# Patient Record
Sex: FEMALE | Race: WHITE | Employment: OTHER | URBAN - NONMETROPOLITAN AREA
[De-identification: names, ages, dates, MRNs, and addresses within clinical notes are randomized per-mention and may not be internally consistent; named-entity substitution may affect disease eponyms.]

---

## 2017-01-06 ENCOUNTER — HOSPITAL ENCOUNTER (EMERGENCY)
Age: 42
Discharge: HOME | End: 2017-01-06
Payer: MEDICAID

## 2017-01-06 ENCOUNTER — APPOINTMENT (OUTPATIENT)
Dept: CT IMAGING | Age: 42
End: 2017-01-06
Payer: MEDICAID

## 2017-01-06 VITALS
SYSTOLIC BLOOD PRESSURE: 120 MMHG | RESPIRATION RATE: 18 BRPM | HEART RATE: 88 BPM | DIASTOLIC BLOOD PRESSURE: 81 MMHG | TEMPERATURE: 98.4 F | HEIGHT: 69 IN | WEIGHT: 221 LBS | OXYGEN SATURATION: 98 % | BODY MASS INDEX: 32.73 KG/M2
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
## 2017-01-06 DIAGNOSIS — S00.33XA CONTUSION OF NOSE, INITIAL ENCOUNTER: ICD-10-CM

## 2017-01-06 PROCEDURE — 70486 CT MAXILLOFACIAL W/O DYE: CPT

## 2017-01-06 PROCEDURE — 99282 EMERGENCY DEPT VISIT SF MDM: CPT | Performed by: NURSE PRACTITIONER

## 2017-01-06 PROCEDURE — 99284 EMERGENCY DEPT VISIT MOD MDM: CPT

## 2017-01-06 RX ORDER — OXYCODONE AND ACETAMINOPHEN 10; 325 MG/1; MG/1
1 TABLET ORAL 3 TIMES DAILY PRN
COMMUNITY
End: 2022-06-13 | Stop reason: ALTCHOICE

## 2017-01-06 RX ORDER — LORATADINE 10 MG/1
10 TABLET ORAL DAILY
COMMUNITY

## 2017-01-06 RX ORDER — BUDESONIDE AND FORMOTEROL FUMARATE DIHYDRATE 80; 4.5 UG/1; UG/1
2 AEROSOL RESPIRATORY (INHALATION) 2 TIMES DAILY
COMMUNITY

## 2017-01-06 RX ORDER — PROPRANOLOL HYDROCHLORIDE 20 MG/1
20 TABLET ORAL 3 TIMES DAILY
COMMUNITY

## 2017-01-06 RX ORDER — ESOMEPRAZOLE MAGNESIUM 40 MG/1
40 FOR SUSPENSION ORAL DAILY
COMMUNITY
End: 2018-08-30

## 2017-01-06 RX ORDER — NATEGLINIDE 60 MG/1
60 TABLET ORAL
COMMUNITY
End: 2022-06-13 | Stop reason: ALTCHOICE

## 2017-01-06 RX ORDER — AMITRIPTYLINE HYDROCHLORIDE 50 MG/1
50 TABLET, FILM COATED ORAL
COMMUNITY

## 2017-01-06 RX ORDER — PAROXETINE 30 MG/1
40 TABLET, FILM COATED ORAL EVERY MORNING
COMMUNITY

## 2017-01-06 RX ORDER — METOPROLOL TARTRATE 100 MG/1
100 TABLET ORAL 2 TIMES DAILY
COMMUNITY

## 2017-01-06 RX ORDER — IBUPROFEN 800 MG/1
800 TABLET ORAL EVERY 8 HOURS PRN
COMMUNITY

## 2017-01-06 RX ORDER — MEDROXYPROGESTERONE ACETATE 150 MG/ML
150 INJECTION, SUSPENSION INTRAMUSCULAR
COMMUNITY

## 2017-01-06 RX ORDER — FLUTICASONE PROPIONATE 50 MCG
2 SPRAY, SUSPENSION (ML) NASAL DAILY
COMMUNITY

## 2017-01-06 RX ORDER — TIZANIDINE 4 MG/1
4 TABLET ORAL 3 TIMES DAILY
COMMUNITY

## 2017-01-06 RX ORDER — ALBUTEROL SULFATE 90 UG/1
2 AEROSOL, METERED RESPIRATORY (INHALATION) EVERY 4 HOURS PRN
COMMUNITY

## 2017-01-06 RX ORDER — FUROSEMIDE 40 MG/1
40 TABLET ORAL DAILY
COMMUNITY

## 2017-01-06 RX ORDER — PREGABALIN 100 MG/1
100 CAPSULE ORAL 3 TIMES DAILY
COMMUNITY

## 2017-01-06 ASSESSMENT — PAIN DESCRIPTION - LOCATION: LOCATION: FACE

## 2017-01-06 ASSESSMENT — ENCOUNTER SYMPTOMS: VOMITING: 0

## 2017-01-06 ASSESSMENT — PAIN SCALES - GENERAL: PAINLEVEL_OUTOF10: 7

## 2017-01-06 ASSESSMENT — PAIN DESCRIPTION - PAIN TYPE: TYPE: ACUTE PAIN

## 2017-01-23 ENCOUNTER — HOSPITAL ENCOUNTER (OUTPATIENT)
Dept: GENERAL RADIOLOGY | Age: 42
Discharge: HOME | End: 2017-01-23
Payer: MEDICAID

## 2017-01-23 DIAGNOSIS — M79.602 LEFT ARM PAIN: ICD-10-CM

## 2017-01-23 DIAGNOSIS — M25.512 LEFT SHOULDER PAIN, UNSPECIFIED CHRONICITY: ICD-10-CM

## 2017-01-23 PROCEDURE — 73090 X-RAY EXAM OF FOREARM: CPT

## 2017-01-23 PROCEDURE — 73060 X-RAY EXAM OF HUMERUS: CPT

## 2017-01-23 PROCEDURE — 73030 X-RAY EXAM OF SHOULDER: CPT

## 2017-01-23 PROCEDURE — 73110 X-RAY EXAM OF WRIST: CPT

## 2017-01-30 ENCOUNTER — HOSPITAL ENCOUNTER (OUTPATIENT)
Dept: PHYSICAL THERAPY | Age: 42
Setting detail: THERAPIES SERIES
Discharge: HOME | End: 2017-01-30
Payer: MEDICAID

## 2017-01-30 PROCEDURE — 97162 PT EVAL MOD COMPLEX 30 MIN: CPT

## 2017-01-30 ASSESSMENT — PAIN SCALES - GENERAL: PAINLEVEL_OUTOF10: 4

## 2017-01-30 ASSESSMENT — PAIN DESCRIPTION - PAIN TYPE: TYPE: ACUTE PAIN

## 2017-01-30 ASSESSMENT — PAIN DESCRIPTION - LOCATION: LOCATION: RIB CAGE;HIP

## 2017-01-30 ASSESSMENT — PAIN DESCRIPTION - DESCRIPTORS: DESCRIPTORS: ACHING

## 2017-01-30 ASSESSMENT — PAIN DESCRIPTION - FREQUENCY: FREQUENCY: CONTINUOUS

## 2017-03-27 ENCOUNTER — HOSPITAL ENCOUNTER (OUTPATIENT)
Dept: PHYSICAL THERAPY | Age: 42
Setting detail: THERAPIES SERIES
Discharge: HOME | End: 2017-03-27
Payer: MEDICAID

## 2017-03-27 PROCEDURE — 97162 PT EVAL MOD COMPLEX 30 MIN: CPT

## 2017-04-10 ENCOUNTER — HOSPITAL ENCOUNTER (OUTPATIENT)
Dept: PHYSICAL THERAPY | Age: 42
Setting detail: THERAPIES SERIES
Discharge: HOME | End: 2017-04-10
Payer: MEDICAID

## 2017-04-10 PROCEDURE — 97110 THERAPEUTIC EXERCISES: CPT

## 2017-04-12 ENCOUNTER — HOSPITAL ENCOUNTER (OUTPATIENT)
Dept: PHYSICAL THERAPY | Age: 42
Setting detail: THERAPIES SERIES
Discharge: HOME | End: 2017-04-12
Payer: MEDICAID

## 2017-04-12 PROCEDURE — 97110 THERAPEUTIC EXERCISES: CPT

## 2017-04-17 ENCOUNTER — HOSPITAL ENCOUNTER (OUTPATIENT)
Dept: PHYSICAL THERAPY | Age: 42
Setting detail: THERAPIES SERIES
Discharge: HOME | End: 2017-04-17
Payer: MEDICAID

## 2017-04-17 PROCEDURE — 97112 NEUROMUSCULAR REEDUCATION: CPT

## 2017-04-17 PROCEDURE — 97110 THERAPEUTIC EXERCISES: CPT

## 2017-04-19 ENCOUNTER — HOSPITAL ENCOUNTER (OUTPATIENT)
Dept: PHYSICAL THERAPY | Age: 42
Setting detail: THERAPIES SERIES
Discharge: HOME | End: 2017-04-19
Payer: MEDICAID

## 2017-04-19 PROCEDURE — 97110 THERAPEUTIC EXERCISES: CPT

## 2017-04-24 ENCOUNTER — HOSPITAL ENCOUNTER (OUTPATIENT)
Dept: PHYSICAL THERAPY | Age: 42
Setting detail: THERAPIES SERIES
Discharge: HOME | End: 2017-04-24
Payer: MEDICAID

## 2017-04-24 PROCEDURE — 97110 THERAPEUTIC EXERCISES: CPT

## 2017-04-24 PROCEDURE — 97112 NEUROMUSCULAR REEDUCATION: CPT

## 2017-04-26 ENCOUNTER — APPOINTMENT (OUTPATIENT)
Dept: PHYSICAL THERAPY | Age: 42
End: 2017-04-26
Payer: MEDICAID

## 2017-05-01 ENCOUNTER — HOSPITAL ENCOUNTER (OUTPATIENT)
Dept: PHYSICAL THERAPY | Age: 42
Setting detail: THERAPIES SERIES
Discharge: HOME | End: 2017-05-01
Payer: MEDICAID

## 2017-05-01 PROCEDURE — 97530 THERAPEUTIC ACTIVITIES: CPT

## 2017-05-01 PROCEDURE — 97110 THERAPEUTIC EXERCISES: CPT

## 2017-05-03 ENCOUNTER — HOSPITAL ENCOUNTER (OUTPATIENT)
Dept: PHYSICAL THERAPY | Age: 42
Setting detail: THERAPIES SERIES
Discharge: HOME | End: 2017-05-03
Payer: MEDICAID

## 2017-05-03 PROCEDURE — 97110 THERAPEUTIC EXERCISES: CPT

## 2017-07-28 ENCOUNTER — APPOINTMENT (OUTPATIENT)
Dept: GENERAL RADIOLOGY | Age: 42
End: 2017-07-28
Payer: MEDICAID

## 2017-07-28 ENCOUNTER — HOSPITAL ENCOUNTER (EMERGENCY)
Age: 42
Discharge: HOME | End: 2017-07-28
Payer: MEDICAID

## 2017-07-28 VITALS
DIASTOLIC BLOOD PRESSURE: 71 MMHG | HEART RATE: 99 BPM | TEMPERATURE: 98.2 F | BODY MASS INDEX: 31.1 KG/M2 | OXYGEN SATURATION: 95 % | WEIGHT: 210 LBS | RESPIRATION RATE: 20 BRPM | HEIGHT: 69 IN | SYSTOLIC BLOOD PRESSURE: 109 MMHG

## 2017-07-28 DIAGNOSIS — L03.031 CELLULITIS OF TOE OF RIGHT FOOT: Primary | ICD-10-CM

## 2017-07-28 PROCEDURE — 73630 X-RAY EXAM OF FOOT: CPT

## 2017-07-28 PROCEDURE — 99283 EMERGENCY DEPT VISIT LOW MDM: CPT | Performed by: NURSE PRACTITIONER

## 2017-07-28 PROCEDURE — 99283 EMERGENCY DEPT VISIT LOW MDM: CPT

## 2017-07-28 RX ORDER — CLINDAMYCIN HYDROCHLORIDE 300 MG/1
300 CAPSULE ORAL 3 TIMES DAILY
Qty: 30 CAPSULE | Refills: 0 | Status: SHIPPED | OUTPATIENT
Start: 2017-07-28 | End: 2017-08-07

## 2017-07-28 ASSESSMENT — ENCOUNTER SYMPTOMS
NAUSEA: 0
DIARRHEA: 0
VOMITING: 0

## 2017-07-28 ASSESSMENT — PAIN DESCRIPTION - LOCATION: LOCATION: TOE (COMMENT WHICH ONE)

## 2017-07-28 ASSESSMENT — PAIN DESCRIPTION - ORIENTATION: ORIENTATION: RIGHT

## 2017-07-28 ASSESSMENT — PAIN SCALES - GENERAL: PAINLEVEL_OUTOF10: 8

## 2017-07-28 ASSESSMENT — PAIN DESCRIPTION - PAIN TYPE: TYPE: ACUTE PAIN

## 2017-07-31 ENCOUNTER — HOSPITAL ENCOUNTER (INPATIENT)
Facility: HOSPITAL | Age: 42
LOS: 2 days | Discharge: HOME | End: 2017-08-02
Attending: FAMILY MEDICINE | Admitting: FAMILY MEDICINE

## 2017-07-31 ENCOUNTER — APPOINTMENT (OUTPATIENT)
Dept: GENERAL RADIOLOGY | Facility: HOSPITAL | Age: 42
End: 2017-07-31

## 2017-07-31 PROBLEM — L03.039 CELLULITIS OF GREAT TOE: Status: ACTIVE | Noted: 2017-07-31

## 2017-07-31 LAB
ALBUMIN SERPL-MCNC: 4.1 G/DL (ref 3.5–5)
ALBUMIN/GLOB SERPL: 1.2 G/DL (ref 1.1–2.5)
ALP SERPL-CCNC: 102 U/L (ref 24–120)
ALT SERPL W P-5'-P-CCNC: 61 U/L (ref 0–54)
ANION GAP SERPL CALCULATED.3IONS-SCNC: 13 MMOL/L (ref 4–13)
AST SERPL-CCNC: 39 U/L (ref 7–45)
BASOPHILS # BLD AUTO: 0.05 10*3/MM3 (ref 0–0.2)
BASOPHILS NFR BLD AUTO: 0.6 % (ref 0–2)
BILIRUB SERPL-MCNC: 0.6 MG/DL (ref 0.1–1)
BILIRUB UR QL STRIP: NEGATIVE
BUN BLD-MCNC: 13 MG/DL (ref 5–21)
BUN/CREAT SERPL: 21 (ref 7–25)
CALCIUM SPEC-SCNC: 8.8 MG/DL (ref 8.4–10.4)
CHLORIDE SERPL-SCNC: 105 MMOL/L (ref 98–110)
CLARITY UR: CLEAR
CO2 SERPL-SCNC: 22 MMOL/L (ref 24–31)
COLOR UR: YELLOW
CREAT BLD-MCNC: 0.62 MG/DL (ref 0.5–1.4)
DEPRECATED RDW RBC AUTO: 39.6 FL (ref 40–54)
EOSINOPHIL # BLD AUTO: 0.25 10*3/MM3 (ref 0–0.7)
EOSINOPHIL NFR BLD AUTO: 3.2 % (ref 0–4)
ERYTHROCYTE [DISTWIDTH] IN BLOOD BY AUTOMATED COUNT: 13 % (ref 12–15)
GFR SERPL CREATININE-BSD FRML MDRD: 107 ML/MIN/1.73
GLOBULIN UR ELPH-MCNC: 3.3 GM/DL
GLUCOSE BLD-MCNC: 176 MG/DL (ref 70–100)
GLUCOSE BLDC GLUCOMTR-MCNC: 238 MG/DL (ref 70–130)
GLUCOSE UR STRIP-MCNC: ABNORMAL MG/DL
HCT VFR BLD AUTO: 36.2 % (ref 37–47)
HGB BLD-MCNC: 12.1 G/DL (ref 12–16)
HGB UR QL STRIP.AUTO: NEGATIVE
IMM GRANULOCYTES # BLD: 0.02 10*3/MM3 (ref 0–0.03)
IMM GRANULOCYTES NFR BLD: 0.3 % (ref 0–5)
KETONES UR QL STRIP: NEGATIVE
LEUKOCYTE ESTERASE UR QL STRIP.AUTO: NEGATIVE
LYMPHOCYTES # BLD AUTO: 2.06 10*3/MM3 (ref 0.72–4.86)
LYMPHOCYTES NFR BLD AUTO: 26.4 % (ref 15–45)
MCH RBC QN AUTO: 28.3 PG (ref 28–32)
MCHC RBC AUTO-ENTMCNC: 33.4 G/DL (ref 33–36)
MCV RBC AUTO: 84.6 FL (ref 82–98)
MONOCYTES # BLD AUTO: 0.7 10*3/MM3 (ref 0.19–1.3)
MONOCYTES NFR BLD AUTO: 9 % (ref 4–12)
NEUTROPHILS # BLD AUTO: 4.73 10*3/MM3 (ref 1.87–8.4)
NEUTROPHILS NFR BLD AUTO: 60.5 % (ref 39–78)
NITRITE UR QL STRIP: NEGATIVE
PH UR STRIP.AUTO: 7 [PH] (ref 5–8)
PLATELET # BLD AUTO: 241 10*3/MM3 (ref 130–400)
PMV BLD AUTO: 9.9 FL (ref 6–12)
POTASSIUM BLD-SCNC: 3.8 MMOL/L (ref 3.5–5.3)
PROT SERPL-MCNC: 7.4 G/DL (ref 6.3–8.7)
PROT UR QL STRIP: NEGATIVE
RBC # BLD AUTO: 4.28 10*6/MM3 (ref 4.2–5.4)
SODIUM BLD-SCNC: 140 MMOL/L (ref 135–145)
SP GR UR STRIP: 1.02 (ref 1–1.03)
UROBILINOGEN UR QL STRIP: ABNORMAL
WBC NRBC COR # BLD: 7.81 10*3/MM3 (ref 4.8–10.8)

## 2017-07-31 PROCEDURE — 0J9N0ZZ DRAINAGE OF RIGHT LOWER LEG SUBCUTANEOUS TISSUE AND FASCIA, OPEN APPROACH: ICD-10-PCS | Performed by: PODIATRIST

## 2017-07-31 PROCEDURE — 99254 IP/OBS CNSLTJ NEW/EST MOD 60: CPT | Performed by: PODIATRIST

## 2017-07-31 PROCEDURE — 94799 UNLISTED PULMONARY SVC/PX: CPT

## 2017-07-31 PROCEDURE — 87147 CULTURE TYPE IMMUNOLOGIC: CPT | Performed by: PODIATRIST

## 2017-07-31 PROCEDURE — 81003 URINALYSIS AUTO W/O SCOPE: CPT | Performed by: FAMILY MEDICINE

## 2017-07-31 PROCEDURE — 94760 N-INVAS EAR/PLS OXIMETRY 1: CPT

## 2017-07-31 PROCEDURE — 85025 COMPLETE CBC W/AUTO DIFF WBC: CPT | Performed by: FAMILY MEDICINE

## 2017-07-31 PROCEDURE — 80053 COMPREHEN METABOLIC PANEL: CPT | Performed by: FAMILY MEDICINE

## 2017-07-31 PROCEDURE — 87070 CULTURE OTHR SPECIMN AEROBIC: CPT | Performed by: PODIATRIST

## 2017-07-31 PROCEDURE — 94640 AIRWAY INHALATION TREATMENT: CPT

## 2017-07-31 PROCEDURE — 87040 BLOOD CULTURE FOR BACTERIA: CPT | Performed by: FAMILY MEDICINE

## 2017-07-31 PROCEDURE — 10060 I&D ABSCESS SIMPLE/SINGLE: CPT | Performed by: PODIATRIST

## 2017-07-31 PROCEDURE — 73620 X-RAY EXAM OF FOOT: CPT

## 2017-07-31 PROCEDURE — 87185 SC STD ENZYME DETCJ PER NZM: CPT | Performed by: PODIATRIST

## 2017-07-31 PROCEDURE — 82962 GLUCOSE BLOOD TEST: CPT

## 2017-07-31 PROCEDURE — 25010000002 VANCOMYCIN 10 G RECONSTITUTED SOLUTION: Performed by: INTERNAL MEDICINE

## 2017-07-31 PROCEDURE — 87186 SC STD MICRODIL/AGAR DIL: CPT | Performed by: PODIATRIST

## 2017-07-31 PROCEDURE — 25010000002 ENOXAPARIN PER 10 MG: Performed by: FAMILY MEDICINE

## 2017-07-31 PROCEDURE — 87205 SMEAR GRAM STAIN: CPT | Performed by: PODIATRIST

## 2017-07-31 RX ORDER — FUROSEMIDE 40 MG/1
40 TABLET ORAL DAILY
Status: DISCONTINUED | OUTPATIENT
Start: 2017-07-31 | End: 2017-08-02 | Stop reason: HOSPADM

## 2017-07-31 RX ORDER — MEDROXYPROGESTERONE ACETATE 150 MG/ML
150 INJECTION, SUSPENSION INTRAMUSCULAR
COMMUNITY
End: 2017-09-27 | Stop reason: SDUPTHER

## 2017-07-31 RX ORDER — PANTOPRAZOLE SODIUM 40 MG/1
40 TABLET, DELAYED RELEASE ORAL
Status: DISCONTINUED | OUTPATIENT
Start: 2017-08-01 | End: 2017-08-02 | Stop reason: HOSPADM

## 2017-07-31 RX ORDER — LORATADINE 10 MG/1
10 CAPSULE, LIQUID FILLED ORAL DAILY
COMMUNITY
End: 2017-09-27 | Stop reason: SDUPTHER

## 2017-07-31 RX ORDER — ALBUTEROL SULFATE 2.5 MG/3ML
2.5 SOLUTION RESPIRATORY (INHALATION) EVERY 6 HOURS PRN
Status: DISCONTINUED | OUTPATIENT
Start: 2017-07-31 | End: 2017-08-02 | Stop reason: HOSPADM

## 2017-07-31 RX ORDER — PAROXETINE 30 MG/1
15 TABLET, FILM COATED ORAL EVERY MORNING
COMMUNITY
End: 2017-09-27 | Stop reason: SDUPTHER

## 2017-07-31 RX ORDER — CETIRIZINE HYDROCHLORIDE 10 MG/1
10 TABLET ORAL DAILY
Status: DISCONTINUED | OUTPATIENT
Start: 2017-07-31 | End: 2017-08-02 | Stop reason: HOSPADM

## 2017-07-31 RX ORDER — VANCOMYCIN HYDROCHLORIDE 1 G/200ML
1000 INJECTION, SOLUTION INTRAVENOUS EVERY 12 HOURS
Status: DISCONTINUED | OUTPATIENT
Start: 2017-07-31 | End: 2017-07-31

## 2017-07-31 RX ORDER — PAROXETINE 10 MG/1
15 TABLET, FILM COATED ORAL DAILY
Status: DISCONTINUED | OUTPATIENT
Start: 2017-07-31 | End: 2017-08-02 | Stop reason: HOSPADM

## 2017-07-31 RX ORDER — AMITRIPTYLINE HYDROCHLORIDE 150 MG/1
150 TABLET, FILM COATED ORAL NIGHTLY
COMMUNITY
End: 2017-09-27 | Stop reason: SDUPTHER

## 2017-07-31 RX ORDER — FLUTICASONE PROPIONATE 50 MCG
2 SPRAY, SUSPENSION (ML) NASAL DAILY
Status: DISCONTINUED | OUTPATIENT
Start: 2017-07-31 | End: 2017-08-02 | Stop reason: HOSPADM

## 2017-07-31 RX ORDER — IBUPROFEN 800 MG/1
800 TABLET ORAL 3 TIMES DAILY
COMMUNITY
End: 2017-09-27 | Stop reason: SDUPTHER

## 2017-07-31 RX ORDER — LIDOCAINE 50 MG/G
1 PATCH TOPICAL DAILY PRN
COMMUNITY

## 2017-07-31 RX ORDER — TIZANIDINE 4 MG/1
4 TABLET ORAL 3 TIMES DAILY
COMMUNITY
End: 2017-09-27 | Stop reason: SDUPTHER

## 2017-07-31 RX ORDER — METOPROLOL TARTRATE 100 MG/1
100 TABLET ORAL 2 TIMES DAILY
COMMUNITY
End: 2017-09-27 | Stop reason: SDUPTHER

## 2017-07-31 RX ORDER — FLUTICASONE PROPIONATE 50 MCG
2 SPRAY, SUSPENSION (ML) NASAL DAILY
COMMUNITY
End: 2017-09-27 | Stop reason: SDUPTHER

## 2017-07-31 RX ORDER — PROPRANOLOL HYDROCHLORIDE 20 MG/1
20 TABLET ORAL EVERY 6 HOURS PRN
COMMUNITY
End: 2017-09-27 | Stop reason: SDUPTHER

## 2017-07-31 RX ORDER — OXYCODONE AND ACETAMINOPHEN 10; 325 MG/1; MG/1
1 TABLET ORAL 3 TIMES DAILY
COMMUNITY
End: 2017-09-27 | Stop reason: SDUPTHER

## 2017-07-31 RX ORDER — ESOMEPRAZOLE MAGNESIUM 40 MG/1
40 CAPSULE, DELAYED RELEASE ORAL
COMMUNITY
End: 2017-09-27 | Stop reason: SDUPTHER

## 2017-07-31 RX ORDER — PREGABALIN 100 MG/1
100 CAPSULE ORAL 3 TIMES DAILY
Status: DISCONTINUED | OUTPATIENT
Start: 2017-07-31 | End: 2017-08-02 | Stop reason: HOSPADM

## 2017-07-31 RX ORDER — PREGABALIN 100 MG/1
100 CAPSULE ORAL 3 TIMES DAILY
COMMUNITY
End: 2017-09-27 | Stop reason: SDUPTHER

## 2017-07-31 RX ORDER — AMITRIPTYLINE HYDROCHLORIDE 75 MG/1
150 TABLET, FILM COATED ORAL NIGHTLY
Status: DISCONTINUED | OUTPATIENT
Start: 2017-07-31 | End: 2017-08-02 | Stop reason: HOSPADM

## 2017-07-31 RX ORDER — NATEGLINIDE 60 MG/1
60 TABLET ORAL
COMMUNITY
End: 2017-09-27 | Stop reason: SDUPTHER

## 2017-07-31 RX ORDER — TIZANIDINE 4 MG/1
4 TABLET ORAL 3 TIMES DAILY
Status: DISCONTINUED | OUTPATIENT
Start: 2017-07-31 | End: 2017-08-02 | Stop reason: HOSPADM

## 2017-07-31 RX ORDER — OXYCODONE AND ACETAMINOPHEN 10; 325 MG/1; MG/1
1 TABLET ORAL 3 TIMES DAILY
Status: DISCONTINUED | OUTPATIENT
Start: 2017-07-31 | End: 2017-08-02 | Stop reason: HOSPADM

## 2017-07-31 RX ORDER — LIDOCAINE 50 MG/G
1 PATCH TOPICAL DAILY PRN
Status: DISCONTINUED | OUTPATIENT
Start: 2017-07-31 | End: 2017-08-02 | Stop reason: HOSPADM

## 2017-07-31 RX ORDER — PROPRANOLOL HYDROCHLORIDE 20 MG/1
20 TABLET ORAL EVERY 6 HOURS PRN
Status: DISCONTINUED | OUTPATIENT
Start: 2017-07-31 | End: 2017-08-02 | Stop reason: HOSPADM

## 2017-07-31 RX ORDER — METOPROLOL TARTRATE 100 MG/1
100 TABLET ORAL EVERY 12 HOURS SCHEDULED
Status: DISCONTINUED | OUTPATIENT
Start: 2017-07-31 | End: 2017-08-02 | Stop reason: HOSPADM

## 2017-07-31 RX ORDER — BUDESONIDE AND FORMOTEROL FUMARATE DIHYDRATE 80; 4.5 UG/1; UG/1
2 AEROSOL RESPIRATORY (INHALATION)
Status: DISCONTINUED | OUTPATIENT
Start: 2017-07-31 | End: 2017-08-02 | Stop reason: HOSPADM

## 2017-07-31 RX ORDER — CLINDAMYCIN HYDROCHLORIDE 300 MG/1
300 CAPSULE ORAL 3 TIMES DAILY
COMMUNITY
Start: 2017-07-28 | End: 2017-08-02 | Stop reason: HOSPADM

## 2017-07-31 RX ORDER — ALBUTEROL SULFATE 90 UG/1
2 AEROSOL, METERED RESPIRATORY (INHALATION) EVERY 4 HOURS PRN
COMMUNITY
End: 2017-09-27 | Stop reason: SDUPTHER

## 2017-07-31 RX ORDER — FUROSEMIDE 40 MG/1
40 TABLET ORAL DAILY
COMMUNITY
End: 2017-09-27 | Stop reason: SDUPTHER

## 2017-07-31 RX ORDER — BUDESONIDE AND FORMOTEROL FUMARATE DIHYDRATE 80; 4.5 UG/1; UG/1
2 AEROSOL RESPIRATORY (INHALATION)
COMMUNITY
End: 2017-09-27 | Stop reason: SDUPTHER

## 2017-07-31 RX ADMIN — PREGABALIN 100 MG: 100 CAPSULE ORAL at 21:56

## 2017-07-31 RX ADMIN — METOPROLOL TARTRATE 100 MG: 100 TABLET ORAL at 21:56

## 2017-07-31 RX ADMIN — VANCOMYCIN HYDROCHLORIDE 1250 MG: 10 INJECTION, POWDER, LYOPHILIZED, FOR SOLUTION INTRAVENOUS at 22:28

## 2017-07-31 RX ADMIN — OXYCODONE HYDROCHLORIDE AND ACETAMINOPHEN 1 TABLET: 10; 325 TABLET ORAL at 16:38

## 2017-07-31 RX ADMIN — BUDESONIDE AND FORMOTEROL FUMARATE DIHYDRATE 2 PUFF: 80; 4.5 AEROSOL RESPIRATORY (INHALATION) at 20:48

## 2017-07-31 RX ADMIN — AZTREONAM 1 G: 1 INJECTION, POWDER, FOR SOLUTION INTRAMUSCULAR; INTRAVENOUS at 21:55

## 2017-07-31 RX ADMIN — ENOXAPARIN SODIUM 40 MG: 40 INJECTION SUBCUTANEOUS at 22:59

## 2017-07-31 RX ADMIN — TIZANIDINE 4 MG: 4 TABLET ORAL at 22:28

## 2017-07-31 RX ADMIN — OXYCODONE HYDROCHLORIDE AND ACETAMINOPHEN 1 TABLET: 10; 325 TABLET ORAL at 21:56

## 2017-07-31 RX ADMIN — METFORMIN HYDROCHLORIDE 500 MG: 500 TABLET ORAL at 21:55

## 2017-07-31 RX ADMIN — PREGABALIN 100 MG: 100 CAPSULE ORAL at 16:38

## 2017-07-31 RX ADMIN — AMITRIPTYLINE HYDROCHLORIDE 150 MG: 75 TABLET, FILM COATED ORAL at 21:55

## 2017-08-01 LAB
GLUCOSE BLDC GLUCOMTR-MCNC: 149 MG/DL (ref 70–130)
GLUCOSE BLDC GLUCOMTR-MCNC: 167 MG/DL (ref 70–130)
GLUCOSE BLDC GLUCOMTR-MCNC: 167 MG/DL (ref 70–130)
GLUCOSE BLDC GLUCOMTR-MCNC: 254 MG/DL (ref 70–130)

## 2017-08-01 PROCEDURE — 25010000002 ENOXAPARIN PER 10 MG: Performed by: FAMILY MEDICINE

## 2017-08-01 PROCEDURE — 99024 POSTOP FOLLOW-UP VISIT: CPT | Performed by: PODIATRIST

## 2017-08-01 PROCEDURE — 94799 UNLISTED PULMONARY SVC/PX: CPT

## 2017-08-01 PROCEDURE — 82962 GLUCOSE BLOOD TEST: CPT

## 2017-08-01 PROCEDURE — 25010000002 VANCOMYCIN 10 G RECONSTITUTED SOLUTION: Performed by: INTERNAL MEDICINE

## 2017-08-01 RX ADMIN — BUDESONIDE AND FORMOTEROL FUMARATE DIHYDRATE 2 PUFF: 80; 4.5 AEROSOL RESPIRATORY (INHALATION) at 20:31

## 2017-08-01 RX ADMIN — OXYCODONE HYDROCHLORIDE AND ACETAMINOPHEN 1 TABLET: 10; 325 TABLET ORAL at 15:03

## 2017-08-01 RX ADMIN — FUROSEMIDE 40 MG: 40 TABLET ORAL at 08:20

## 2017-08-01 RX ADMIN — AZTREONAM 1 G: 1 INJECTION, POWDER, FOR SOLUTION INTRAMUSCULAR; INTRAVENOUS at 12:13

## 2017-08-01 RX ADMIN — PANTOPRAZOLE SODIUM 40 MG: 40 TABLET, DELAYED RELEASE ORAL at 06:14

## 2017-08-01 RX ADMIN — CETIRIZINE HYDROCHLORIDE 10 MG: 10 TABLET, FILM COATED ORAL at 08:20

## 2017-08-01 RX ADMIN — PREGABALIN 100 MG: 100 CAPSULE ORAL at 08:20

## 2017-08-01 RX ADMIN — AZTREONAM 1 G: 1 INJECTION, POWDER, FOR SOLUTION INTRAMUSCULAR; INTRAVENOUS at 03:52

## 2017-08-01 RX ADMIN — METFORMIN HYDROCHLORIDE 500 MG: 500 TABLET ORAL at 08:20

## 2017-08-01 RX ADMIN — ENOXAPARIN SODIUM 40 MG: 40 INJECTION SUBCUTANEOUS at 20:06

## 2017-08-01 RX ADMIN — PAROXETINE 15 MG: 10 TABLET, FILM COATED ORAL at 08:20

## 2017-08-01 RX ADMIN — AMITRIPTYLINE HYDROCHLORIDE 150 MG: 75 TABLET, FILM COATED ORAL at 20:06

## 2017-08-01 RX ADMIN — TIZANIDINE 4 MG: 4 TABLET ORAL at 08:20

## 2017-08-01 RX ADMIN — OXYCODONE HYDROCHLORIDE AND ACETAMINOPHEN 1 TABLET: 10; 325 TABLET ORAL at 20:06

## 2017-08-01 RX ADMIN — AZTREONAM 1 G: 1 INJECTION, POWDER, FOR SOLUTION INTRAMUSCULAR; INTRAVENOUS at 20:06

## 2017-08-01 RX ADMIN — METFORMIN HYDROCHLORIDE 500 MG: 500 TABLET ORAL at 17:18

## 2017-08-01 RX ADMIN — METOPROLOL TARTRATE 100 MG: 100 TABLET ORAL at 20:06

## 2017-08-01 RX ADMIN — FLUTICASONE PROPIONATE 2 SPRAY: 50 SPRAY, METERED NASAL at 08:20

## 2017-08-01 RX ADMIN — PREGABALIN 100 MG: 100 CAPSULE ORAL at 15:03

## 2017-08-01 RX ADMIN — OXYCODONE HYDROCHLORIDE AND ACETAMINOPHEN 1 TABLET: 10; 325 TABLET ORAL at 09:18

## 2017-08-01 RX ADMIN — PREGABALIN 100 MG: 100 CAPSULE ORAL at 20:06

## 2017-08-01 RX ADMIN — TIZANIDINE 4 MG: 4 TABLET ORAL at 20:06

## 2017-08-01 RX ADMIN — VANCOMYCIN HYDROCHLORIDE 1250 MG: 10 INJECTION, POWDER, LYOPHILIZED, FOR SOLUTION INTRAVENOUS at 20:55

## 2017-08-01 RX ADMIN — VANCOMYCIN HYDROCHLORIDE 1250 MG: 10 INJECTION, POWDER, LYOPHILIZED, FOR SOLUTION INTRAVENOUS at 09:18

## 2017-08-01 RX ADMIN — METFORMIN HYDROCHLORIDE 500 MG: 500 TABLET ORAL at 12:13

## 2017-08-01 RX ADMIN — BUDESONIDE AND FORMOTEROL FUMARATE DIHYDRATE 2 PUFF: 80; 4.5 AEROSOL RESPIRATORY (INHALATION) at 07:41

## 2017-08-01 RX ADMIN — LIDOCAINE 1 PATCH: 50 PATCH CUTANEOUS at 10:27

## 2017-08-01 RX ADMIN — TIZANIDINE 4 MG: 4 TABLET ORAL at 15:03

## 2017-08-01 RX ADMIN — METOPROLOL TARTRATE 100 MG: 100 TABLET ORAL at 08:20

## 2017-08-01 NOTE — PLAN OF CARE
Problem: Patient Care Overview (Adult)  Goal: Plan of Care Review  Outcome: Ongoing (interventions implemented as appropriate)    08/01/17 1520   Coping/Psychosocial Response Interventions   Plan Of Care Reviewed With patient   Patient Care Overview   Progress no change   Outcome Evaluation   Outcome Summary/Follow up Plan New IV started today. Patient became very agitated when she felt her call light was not being answered and the nurse/aide were not getting to her room quickly enough. Dressign changed per Dr. Dennis this AM and again after patient showered.       Goal: Adult Individualization and Mutuality  Outcome: Ongoing (interventions implemented as appropriate)    08/01/17 1520   Individualization   Patient Specific Preferences None at this time   Patient Specific Goals Go home soon   Patient Specific Interventions Explain all procedures, Crush Percocet and put in applesauce   Mutuality/Individual Preferences   What Anxieties, Fears or Concerns Do You Have About Your Health or Care? None at this time   What Questions Do You Have About Your Health or Care? None at this time   What Information Would Help Us Give You More Personalized Care? None at this time       Goal: Discharge Needs Assessment  Outcome: Ongoing (interventions implemented as appropriate)    07/31/17 1833   Discharge Needs Assessment   Concerns To Be Addressed no discharge needs identified   Readmission Within The Last 30 Days no previous admission in last 30 days   Equipment Needed After Discharge none   Discharge Disposition still a patient   Current Health   Anticipated Changes Related to Illness none   Self-Care   Equipment Currently Used at Home none   Living Environment   Transportation Available car;family or friend will provide         Problem: Cellulitis (Adult)  Goal: Signs and Symptoms of Listed Potential Problems Will be Absent or Manageable (Cellulitis)  Outcome: Ongoing (interventions implemented as appropriate)    08/01/17 1520    Cellulitis   Problems Assessed (Cellulitis) all   Problems Present (Cellulitis) pain;infection

## 2017-08-01 NOTE — PROGRESS NOTES
[facility] - PODIATRY    Today's Date: 17    Patient Name: Zahida Geller  MRN: [id number]  CSN: [id number]  PCP: Judith Mauro MD  Referring Provider: Judith Mauro MD    SUBJECTIVE   CC: Right great toe Infection    HPI: Zahida Geller, a 41 y.o.female, being followed for right great toe abscess and cellulitis. Denies overnight events. States that her pain is decreased to 2/10. Denies any constitutional symptoms. No other pedal complaints at this time.    Past Medical History:   Diagnosis Date   • Anxiety    • Asthma    • Chronic back pain    • Chronic hip pain    • Depression    • Diabetes mellitus    • Edema of both legs    • Hypertension    • Restless leg syndrome    • Substance abuse in remission    • Traumatic brain injury      Past Surgical History:   Procedure Laterality Date   • BACK SURGERY      L5-L6 surgery   •  SECTION     • LEG SURGERY      Left leg jose ramon placement   • PEG TUBE INSERTION     • PEG TUBE REMOVAL     • TRACHEOSTOMY      with reversal   • TUBAL ABDOMINAL LIGATION       History reviewed. No pertinent family history.  Social History     Social History   • Marital status: Single     Spouse name: N/A   • Number of children: N/A   • Years of education: N/A     Occupational History   • Not on file.     Social History Main Topics   • Smoking status: Never Smoker   • Smokeless tobacco: Not on file   • Alcohol use No   • Drug use: Yes      Comment: history of, narcotics   • Sexual activity: No     Other Topics Concern   • Not on file     Social History Narrative   • No narrative on file     Allergies   Allergen Reactions   • Penicillins    • Tramadol      Current Facility-Administered Medications   Medication Dose Route Frequency Provider Last Rate Last Dose   • albuterol (PROVENTIL) nebulizer solution 0.083% 2.5 mg/3mL  2.5 mg Nebulization Q6H PRN Judith Mauro MD       • amitriptyline (ELAVIL) tablet 150 mg  150 mg Oral Nightly Judith Graves  MD [place]   150 mg at 07/31/17 2155   • aztreonam (AZACTAM) 1 g/100 mL 0.9% NS IVPB (mbp)  1 g Intravenous Q8H Ugo Pineda MD 0 mL/hr at 07/31/17 2344 1 g at 08/01/17 0352   • budesonide-formoterol (SYMBICORT) 80-4.5 MCG/ACT inhaler 2 puff  2 puff Inhalation BID - RT Judith Mauro MD   2 puff at 08/01/17 0741   • cetirizine (zyrTEC) tablet 10 mg  10 mg Oral Daily Judith Mauro MD   10 mg at 08/01/17 0820   • enoxaparin (LOVENOX) syringe 40 mg  40 mg Subcutaneous Nightly Judith Mauro MD   40 mg at 07/31/17 2259   • fluticasone (FLONASE) 50 MCG/ACT nasal spray 2 spray  2 spray Nasal Daily Judith Mauro MD   2 spray at 08/01/17 0820   • furosemide (LASIX) tablet 40 mg  40 mg Oral Daily Judith Mauro MD   40 mg at 08/01/17 0820   • lidocaine (LIDODERM) 5 % 1 patch  1 patch Transdermal Daily PRN Judith Mauro MD       • metFORMIN (GLUCOPHAGE) tablet 500 mg  500 mg Oral TID With Meals Judith Mauro MD   500 mg at 08/01/17 0820   • metoprolol tartrate (LOPRESSOR) tablet 100 mg  100 mg Oral Q12H Judith Mauro MD   100 mg at 08/01/17 0820   • oxyCODONE-acetaminophen (PERCOCET)  MG per tablet 1 tablet  1 tablet Oral TID Judith Mauro MD   1 tablet at 07/31/17 2156   • pantoprazole (PROTONIX) EC tablet 40 mg  40 mg Oral Q AM Judith Mauro MD   40 mg at 08/01/17 0614   • PARoxetine (PAXIL) tablet 15 mg  15 mg Oral Daily Judith Mauro MD   15 mg at 08/01/17 0820   • Pharmacy to dose vancomycin   Does not apply Continuous PRN Ugo Pineda MD       • pregabalin (LYRICA) capsule 100 mg  100 mg Oral TID Judith Mauro MD   100 mg at 08/01/17 0820   • propranolol (INDERAL) tablet 20 mg  20 mg Oral Q6H PRN Judith Mauro MD       • tiZANidine (ZANAFLEX) tablet 4 mg  4 mg Oral TID Judith Mauro MD   4 mg at 08/01/17 0820   • vancomycin 1250 mg/250 mL 0.9% NS IVPB ([facility])  1,250 mg Intravenous Q12H Ugo Pineda MD   Stopped at  08/01/17 0055     Review of Systems   Constitutional: Negative for chills and fever.   HENT: Negative for congestion.    Respiratory: Negative for shortness of breath.    Cardiovascular: Negative for chest pain.   Gastrointestinal: Negative for constipation, diarrhea, nausea and vomiting.   Skin: Positive for wound.   Neurological: Positive for numbness.       OBJECTIVE     Vitals:    08/01/17 0816   BP: 126/75   Pulse: 92   Resp: 20   Temp: 98.7 °F (37.1 °C)   SpO2: 96%       PHYSICAL EXAM  GEN:   A&Ox3, NAD. Pt presents in hospital bed.      NEURO:   Light touch sensation diminished  No Tinel's or Villeux sign.     VASC:  Skin temperature Warm to Warm proximal to distal gricel; increased warmth to RLE - improving  DP pulses 3/4 Right, 2/4 Left  PT pulses 2/4 Right, 2/4 Left  CFT <3 sec gricel  Pedal hair growth absent  trace edema noted gricel     MUSC/SKEL:  Muscle Strength intact  Decreased POP of right hallux (dorsal and plantar)    DERM:  Wound noted to right dorsal hallux with granular base. No purulence, malodor, tracking or probe to bone. Erythema has receded from ankle to dorsal foot.       RADIOLOGY/NUCLEAR:  Xr Foot 2 View Right    Result Date: 7/31/2017  Narrative: EXAMINATION: XR FOOT 2 VW RIGHT-  7/31/2017 7:34 PM CDT  HISTORY: Right toe infection.  COMPARISON: None.  TECHNIQUE: 2 views.  AP and lateral projection imaging.  FINDINGS:  The bony structures are intact without fracture.  The joint spaces are maintained.  There is no abnormal soft tissue gas.  There is no periostitis or erosive changes or plain film evidence of osteomyelitis.  Soft tissue swelling along the dorsum of the forefoot is suspected on the lateral view.      Impression: 1. No acute bony abnormality. 2. Soft tissue swelling. This report was finalized on 07/31/2017 20:38 by Dr. Terry Valadez MD.      LABORATORY/CULTURE RESULTS:    Results from last 7 days  Lab Units 07/31/17  1558   WBC 10*3/mm3 7.81   HEMOGLOBIN g/dL 12.1   HEMATOCRIT %  36.2*   PLATELETS 10*3/mm3 241       Results from last 7 days  Lab Units 07/31/17  1558   SODIUM mmol/L 140   POTASSIUM mmol/L 3.8   CHLORIDE mmol/L 105   CO2 mmol/L 22.0*   BUN mg/dL 13   CREATININE mg/dL 0.62   CALCIUM mg/dL 8.8   BILIRUBIN mg/dL 0.6   ALK PHOS U/L 102   ALT (SGPT) U/L 61*   AST (SGOT) U/L 39   GLUCOSE mg/dL 176*             PATHOLOGY RESULTS:       ASSESSMENT/PLAN   1. Abscess and Cellulitis, Right foot - 1 day s/p bedside I&D  2. DM2 with Neuropathy  3. TBI    Comprehensive lower extremity examination and evaluation was performed.  Discussed findings and treatment plan including risks, benefits, and treatment options with patient in detail. Patient agreed with treatment plan.  Bandage of betadine applied, to be reapplied daily  ID onboard for abx recs  No apparent deep abscess at this time.  Please place consult to [facility] for management of wound after discharge   Will continue to follow (will be unavailable until afternoon 8/3/17)          This document has been electronically signed by Christopher Dennis DPM on August 1, 2017 8:44 AM

## 2017-08-01 NOTE — PROGRESS NOTES
"Pharmacy Antimicrobial Dosing Service  Vancomycin    Zahida Geller is a 41 y.o. female  [Ht: 69\" (175.3 cm); Wt: 225 lb 5 oz (102 kg)] Body mass index is 33.27 kg/(m^2).    Estimated Creatinine Clearance: \"100\" mL/min (by C-G formula based on Cr of 0.62).   Lab Results   Component Value Date    CREATININE 0.62 07/31/2017      Lab Results   Component Value Date    WBC 7.81 07/31/2017    No results found for: VANCOPEAK, VANCOTROUGH, VANCORANDOM      Microbiology Results (last 10 days)     Procedure Component Value - Date/Time    Wound Culture [[id number]]  (Abnormal) Collected:  07/31/17 1743    Lab Status:  Preliminary result Specimen:  Wound from Foot, Right Updated:  08/01/17 1258     Wound Culture Light growth (2+) Staphylococcus aureus, MRSA (C)        Methicillin resistant Staphylococcus aureus, Patient may be an isolation risk.        BETA LACTAMASE Positive     Gram Stain Result Few (2+) WBCs seen      Few (2+) Gram positive cocci    Blood Culture [[id number]]  (Normal) Collected:  07/31/17 1600    Lab Status:  Preliminary result Specimen:  Blood from Arm, Left Updated:  08/01/17 0501     Blood Culture No growth at less than 24 hours    Blood Culture [[id number]]  (Normal) Collected:  07/31/17 1550    Lab Status:  Preliminary result Specimen:  Blood from Arm, Right Updated:  08/01/17 0501     Blood Culture No growth at less than 24 hours          Current vancomycin dose:  1250 mg IVPB every 12 hours, day 2 of therapy.    Indication for use: SSTI    Complicating factor(s):   Diabetes  Obesity    Action/Plan:  Trough level ordered prior to 4th dose (due 08/02/17 at 09:00). No loading dose received on initiation. Pharmacy will continue daily follow-up evaluation.    Caesar Lu RPH PharmD  08/01/17 1:31 PM    "

## 2017-08-01 NOTE — PROGRESS NOTES
"Infectious Diseases Progress Note    Patient:  Zahida Geller  YOB: 1975  MRN: [id number]   Admit date: 7/31/2017   Admitting Physician: Judith Mauro MD  Primary Care Physician: Judith Mauro MD    Chief Complaint/Interval History: She feels okay.  No new symptoms.  Tolerating antibiotic without side effect.  She has had no fever.    Intake/Output Summary (Last 24 hours) at 08/01/17 1256  Last data filed at 08/01/17 1213   Gross per 24 hour   Intake              943 ml   Output                0 ml   Net              943 ml     Allergies:   Allergies   Allergen Reactions   • Penicillins    • Tramadol      Current Scheduled Medications:     amitriptyline 150 mg Oral Nightly   aztreonam 1 g Intravenous Q8H   budesonide-formoterol 2 puff Inhalation BID - RT   cetirizine 10 mg Oral Daily   enoxaparin 40 mg Subcutaneous Nightly   fluticasone 2 spray Nasal Daily   furosemide 40 mg Oral Daily   metFORMIN 500 mg Oral TID With Meals   metoprolol tartrate 100 mg Oral Q12H   oxyCODONE-acetaminophen 1 tablet Oral TID   pantoprazole 40 mg Oral Q AM   PARoxetine 15 mg Oral Daily   pregabalin 100 mg Oral TID   tiZANidine 4 mg Oral TID   vancomycin 1,250 mg Intravenous Q12H     Current PRN Medications:  •  albuterol  •  lidocaine  •  Pharmacy to dose vancomycin  •  propranolol    Review of Systems No nausea, diarrhea, rash.    Vital Signs:  /75 (BP Location: Right arm, Patient Position: Lying)  Pulse 92  Temp 98.7 °F (37.1 °C) (Oral)   Resp 20  Ht 69\" (175.3 cm)  Wt 225 lb 5 oz (102 kg)  LMP  (LMP Unknown) Comment: tubal ligation  SpO2 96%  BMI 33.27 kg/m2    Physical Exam  Vital signs reviewed.  Cellulitis on foot and toe stable to somewhat better.  Line/IV site: No erythema, warmth, induration, or tenderness.    Lab Results:  CBC:   Results from last 7 days  Lab Units 07/31/17  1558   WBC 10*3/mm3 7.81   HEMOGLOBIN g/dL 12.1   HEMATOCRIT % 36.2*   PLATELETS 10*3/mm3 241     BMP:  Results " from last 7 days  Lab Units 07/31/17  1558   SODIUM mmol/L 140   POTASSIUM mmol/L 3.8   CHLORIDE mmol/L 105   CO2 mmol/L 22.0*   BUN mg/dL 13   CREATININE mg/dL 0.62   GLUCOSE mg/dL 176*   CALCIUM mg/dL 8.8   ALT (SGPT) U/L 61*     Culture Results:   Blood Culture   Date Value Ref Range Status   07/31/2017 No growth at less than 24 hours  Preliminary   07/31/2017 No growth at less than 24 hours  Preliminary     Wound Culture   Date Value Ref Range Status   07/31/2017 Light growth (2+) Staphylococcus aureus, MRSA (C)  Preliminary     Comment:       Methicillin resistant Staphylococcus aureus, Patient may be an isolation risk.     Radiology: None  Additional Studies Reviewed: None    Impression:   Great toe abscess/cellulitis-improving with drainage and antibiotic treatment    Recommendations:   Continue ceftaroline and await final ID and susceptibility testing from culture  Continue to follow  We will adjust antibiotics once we have final culture results    Ugo Hammonds MD

## 2017-08-01 NOTE — H&P
History and Physical      CHIEF COMPLAINT:  Right great toe pain    Reason for Admission:  Right great toe abscess/cellulitis    History Obtained From:  patient    HISTORY OF PRESENT ILLNESS:      The patient is a 41 y.o. female who I admitted from my office with right great toe pain and infection. She was seen by a local Podiatrist, who did nail care and trimmed a callous on her great toe, about 2 weeks ago. About a week ago, she began having redness and now a blister that has formed on the toe. She has had no fever. The toe is painful. She has been on PO Clindamycin, that has not changed the area.     Past Medical History:    Past Medical History:   Diagnosis Date   • Anxiety    • Asthma    • Chronic back pain    • Chronic hip pain    • Depression    • Diabetes mellitus    • Edema of both legs    • Hypertension    • Restless leg syndrome    • Substance abuse in remission    • Traumatic brain injury      Past Surgical History:    Past Surgical History:   Procedure Laterality Date   • BACK SURGERY      L5-L6 surgery   •  SECTION     • LEG SURGERY      Left leg jose ramon placement   • PEG TUBE INSERTION     • PEG TUBE REMOVAL     • TRACHEOSTOMY      with reversal   • TUBAL ABDOMINAL LIGATION           Medications Prior to Admission:    Prescriptions Prior to Admission   Medication Sig Dispense Refill Last Dose   • albuterol (PROVENTIL HFA;VENTOLIN HFA) 108 (90 BASE) MCG/ACT inhaler Inhale 2 puffs Every 4 (Four) Hours As Needed for Wheezing.      • amitriptyline (ELAVIL) 150 MG tablet Take 150 mg by mouth Every Night.      • budesonide-formoterol (SYMBICORT) 80-4.5 MCG/ACT inhaler Inhale 2 puffs 2 (Two) Times a Day.      • clindamycin (CLEOCIN) 300 MG capsule Take 300 mg by mouth 3 (Three) Times a Day.      • esomeprazole (nexIUM) 40 MG capsule Take 40 mg by mouth Every Morning Before Breakfast.      • fluticasone (FLONASE) 50 MCG/ACT nasal spray 2 sprays into each nostril Daily.      • furosemide (LASIX) 40 MG  tablet Take 40 mg by mouth Daily.      • ibuprofen (ADVIL,MOTRIN) 800 MG tablet Take 800 mg by mouth 3 (Three) Times a Day.      • lidocaine (LIDODERM) 5 % Place 1 patch on the skin Daily As Needed for Moderate Pain (4-6). Remove & Discard patch within 12 hours or as directed by MD      • Loratadine (CLARITIN) 10 MG capsule Take 10 mg by mouth Daily.      • medroxyPROGESTERone (DEPO-PROVERA) 150 MG/ML injection Inject 150 mg into the shoulder, thigh, or buttocks Every 3 (Three) Months.      • metFORMIN (GLUCOPHAGE) 500 MG tablet Take 500 mg by mouth 3 (Three) Times a Day With Meals.      • metoprolol tartrate (LOPRESSOR) 100 MG tablet Take 100 mg by mouth 2 (Two) Times a Day.      • Multiple Vitamin (THERA PO) Take 1 tablet by mouth Daily.      • nateglinide (STARLIX) 60 MG tablet Take 60 mg by mouth 3 (Three) Times a Day Before Meals.      • oxyCODONE-acetaminophen (PERCOCET)  MG per tablet Take 1 tablet by mouth 3 (Three) Times a Day.      • PARoxetine (PAXIL) 30 MG tablet Take 15 mg by mouth Every Morning.      • pregabalin (LYRICA) 100 MG capsule Take 100 mg by mouth 3 (Three) Times a Day.      • propranolol (INDERAL) 20 MG tablet Take 20 mg by mouth Every 6 (Six) Hours As Needed (PRN anxiety).      • tiZANidine (ZANAFLEX) 4 MG tablet Take 4 mg by mouth 3 (Three) Times a Day.          Allergies:  Penicillins and Tramadol    Social History:   TOBACCO:   reports that she has never smoked. She does not have any smokeless tobacco history on file.  ETOH:   reports that she does not drink alcohol.  DRUGS:   reports that she uses illicit drugs.  MARITAL STATUS:  Not   OCCUPATION:  Not working  Patient currently lives at a Brain injury inpatient facility      Family History:   History reviewed. No pertinent family history.  REVIEW OF SYSTEMS:  Constitutional: neg  CV: neg  Pulmonary: neg  GI: neg  : neg  Psych: neg  Neuro: neg  Skin: neg  MusculoSkeletal: neg  HEENT: neg  Joints: right great toe pain,  "redness, blister formation  Vitals:  /75 (BP Location: Right arm, Patient Position: Lying)  Pulse 92  Temp 98.7 °F (37.1 °C) (Oral)   Resp 20  Ht 69\" (175.3 cm)  Wt 225 lb 5 oz (102 kg)  LMP  (LMP Unknown) Comment: tubal ligation  SpO2 96%  BMI 33.27 kg/m2    PHYSICAL EXAM:  Gen: NAD, alert, pleasant  HEENT: WNL  Lymph: no LAD  Neck: no JVD or masses  Chest: CTA bilat  CV: RRR  Abdomen: NT/ND  Extrem: no C/C/E  Neuro: non focal  Skin: no rashes  Joints: no redness  Foot: right great toe is dressed, yesterday at my office, she had a large blister than extended to the posterior of her great toe  DATA:  I have reviewed the admission labs and imaging tests.    ASSESSMENT AND PLAN:      Right Great Toe Abscess/Cellulitis----appreciate ID and Podiatry input, follow with wound treatment and antibiotics, cultures are pending  H/O TBI---stable  DM2----follow glucose  HTN--BP is stable  Asthma---no issues  Depression---stable with care      Judith Mauro MD  12:46 PM 8/1/2017  "

## 2017-08-01 NOTE — PLAN OF CARE
Problem: Patient Care Overview (Adult)  Goal: Plan of Care Review  Outcome: Ongoing (interventions implemented as appropriate)    08/01/17 0432   Coping/Psychosocial Response Interventions   Plan Of Care Reviewed With patient   Patient Care Overview   Progress no change   Outcome Evaluation   Outcome Summary/Follow up Plan Lovenox started. Pt receiving Vanco and Azactam. Pt is up ad cyn. Percocet scheduled for pain. ADA diet, however pt eats ice cream and drinks regular coke. Attempted to reinforce ADA diet for optimal wound healing, but pt refuses to listen.          Problem: Cellulitis (Adult)  Goal: Signs and Symptoms of Listed Potential Problems Will be Absent or Manageable (Cellulitis)  Outcome: Ongoing (interventions implemented as appropriate)    08/01/17 0432   Cellulitis   Problems Assessed (Cellulitis) all   Problems Present (Cellulitis) pain

## 2017-08-01 NOTE — PROGRESS NOTES
Pharmacokinetic Consult - Vancomycin Dosing    Zahida Geller is a 41 y.o. female who has been consulted for vancomycin dosing for complicated skin and soft tissue infection (cellulitis of great toe).    Relevant clinical data and objective history reviewed:  Lab Results   Component Value Date/Time    CREATININE 0.62 07/31/2017 03:58 PM    BUN 13 07/31/2017 03:58 PM     Estimated Creatinine Clearance: 154.8 mL/min (by C-G formula based on Cr of 0.62).     Lab Results   Component Value Date/Time    WBC 7.81 07/31/2017 03:58 PM    HGB 12.1 07/31/2017 03:58 PM    HCT 36.2 (L) 07/31/2017 03:58 PM    MCV 84.6 07/31/2017 03:58 PM     07/31/2017 03:58 PM     Temp Readings from Last 3 Encounters:   07/31/17 98.3 °F (36.8 °C) (Oral)     Weight: 225 lb 5 oz (102 kg)  Baseline culture/source/susceptibility: wound/blood cultures drawn today (pending).    Assessment/Plan  The patient will be started on vancomycin utilizing scheduled dosing based on adjusted body weight (due to obesity).  Will initiate dose at 1250 mg IV every 12 hours.  Pharmacy will also follow closely for s/sx of nephrotoxicity and infusion reactions.  Serum creatinine will be ordered per policy.  Plan for trough as patient approaches steady state, when pharmacokinetically appropriate.  Due to infection severity, will target a trough of 10-15 ug/mL.  Pharmacy will continue to follow the patient’s culture results and clinical progress daily.    Josie Bradley RP

## 2017-08-02 VITALS
WEIGHT: 225.31 LBS | BODY MASS INDEX: 33.37 KG/M2 | HEART RATE: 88 BPM | DIASTOLIC BLOOD PRESSURE: 80 MMHG | RESPIRATION RATE: 18 BRPM | OXYGEN SATURATION: 98 % | TEMPERATURE: 97.3 F | HEIGHT: 69 IN | SYSTOLIC BLOOD PRESSURE: 130 MMHG

## 2017-08-02 LAB
B-LACTAMASE USUAL SUSC ISLT: POSITIVE
BACTERIA SPEC AEROBE CULT: ABNORMAL
GLUCOSE BLDC GLUCOMTR-MCNC: 175 MG/DL (ref 70–130)
GLUCOSE BLDC GLUCOMTR-MCNC: 187 MG/DL (ref 70–130)
GRAM STN SPEC: ABNORMAL
GRAM STN SPEC: ABNORMAL
VANCOMYCIN TROUGH SERPL-MCNC: 9.16 MCG/ML (ref 10–20)

## 2017-08-02 PROCEDURE — 82962 GLUCOSE BLOOD TEST: CPT

## 2017-08-02 PROCEDURE — 80202 ASSAY OF VANCOMYCIN: CPT

## 2017-08-02 PROCEDURE — 25010000002 VANCOMYCIN 10 G RECONSTITUTED SOLUTION

## 2017-08-02 PROCEDURE — 94799 UNLISTED PULMONARY SVC/PX: CPT

## 2017-08-02 RX ORDER — CLINDAMYCIN HYDROCHLORIDE 150 MG/1
450 CAPSULE ORAL EVERY 8 HOURS SCHEDULED
Status: DISCONTINUED | OUTPATIENT
Start: 2017-08-02 | End: 2017-08-02 | Stop reason: HOSPADM

## 2017-08-02 RX ORDER — CLINDAMYCIN HYDROCHLORIDE 150 MG/1
450 CAPSULE ORAL EVERY 8 HOURS SCHEDULED
Qty: 99 CAPSULE | Refills: 0 | OUTPATIENT
Start: 2017-08-02 | End: 2017-08-08

## 2017-08-02 RX ADMIN — METFORMIN HYDROCHLORIDE 500 MG: 500 TABLET ORAL at 08:13

## 2017-08-02 RX ADMIN — TIZANIDINE 4 MG: 4 TABLET ORAL at 08:13

## 2017-08-02 RX ADMIN — METFORMIN HYDROCHLORIDE 500 MG: 500 TABLET ORAL at 13:49

## 2017-08-02 RX ADMIN — BUDESONIDE AND FORMOTEROL FUMARATE DIHYDRATE 2 PUFF: 80; 4.5 AEROSOL RESPIRATORY (INHALATION) at 06:56

## 2017-08-02 RX ADMIN — CETIRIZINE HYDROCHLORIDE 10 MG: 10 TABLET, FILM COATED ORAL at 08:13

## 2017-08-02 RX ADMIN — PAROXETINE 15 MG: 10 TABLET, FILM COATED ORAL at 08:14

## 2017-08-02 RX ADMIN — FUROSEMIDE 40 MG: 40 TABLET ORAL at 08:14

## 2017-08-02 RX ADMIN — OXYCODONE HYDROCHLORIDE AND ACETAMINOPHEN 1 TABLET: 10; 325 TABLET ORAL at 15:00

## 2017-08-02 RX ADMIN — PREGABALIN 100 MG: 100 CAPSULE ORAL at 15:00

## 2017-08-02 RX ADMIN — VANCOMYCIN HYDROCHLORIDE 1500 MG: 10 INJECTION, POWDER, LYOPHILIZED, FOR SOLUTION INTRAVENOUS at 09:50

## 2017-08-02 RX ADMIN — AZTREONAM 1 G: 1 INJECTION, POWDER, FOR SOLUTION INTRAMUSCULAR; INTRAVENOUS at 05:12

## 2017-08-02 RX ADMIN — FLUTICASONE PROPIONATE 2 SPRAY: 50 SPRAY, METERED NASAL at 08:14

## 2017-08-02 RX ADMIN — TIZANIDINE 4 MG: 4 TABLET ORAL at 15:00

## 2017-08-02 RX ADMIN — PANTOPRAZOLE SODIUM 40 MG: 40 TABLET, DELAYED RELEASE ORAL at 05:13

## 2017-08-02 RX ADMIN — OXYCODONE HYDROCHLORIDE AND ACETAMINOPHEN 1 TABLET: 10; 325 TABLET ORAL at 08:14

## 2017-08-02 RX ADMIN — PREGABALIN 100 MG: 100 CAPSULE ORAL at 08:14

## 2017-08-02 RX ADMIN — METOPROLOL TARTRATE 100 MG: 100 TABLET ORAL at 08:13

## 2017-08-02 RX ADMIN — CLINDAMYCIN HYDROCHLORIDE 450 MG: 150 CAPSULE ORAL at 13:49

## 2017-08-02 NOTE — PLAN OF CARE
Problem: Patient Care Overview (Adult)  Goal: Plan of Care Review  Outcome: Ongoing (interventions implemented as appropriate)    08/02/17 1332   Coping/Psychosocial Response Interventions   Plan Of Care Reviewed With patient   Patient Care Overview   Progress improving       Goal: Adult Individualization and Mutuality  Outcome: Ongoing (interventions implemented as appropriate)  Goal: Discharge Needs Assessment  Outcome: Ongoing (interventions implemented as appropriate)    Problem: Cellulitis (Adult)  Goal: Signs and Symptoms of Listed Potential Problems Will be Absent or Manageable (Cellulitis)  Outcome: Ongoing (interventions implemented as appropriate)  Pt c/o right foot pain. Scheduled Percocet given per MD order. Pt reports good pain control with medication. Pt alert and oriented. Up ambulating independently around room. Continue isolation precautions. Dressing changed per MD order. Suellen-wound area with decreased redness. No swelling noted. Changed to oral abx per Dr. Pineda today.     08/02/17 1332   Cellulitis   Problems Assessed (Cellulitis) all   Problems Present (Cellulitis) pain

## 2017-08-02 NOTE — PROGRESS NOTES
"Infectious Diseases Progress Note    Patient:  Zahida Geller  YOB: 1975  MRN: [id number]   Admit date: 7/31/2017   Admitting Physician: Judith Mauro MD  Primary Care Physician: Judith Mauro MD    Chief Complaint/Interval History: She is feeling better.  No new symptoms.  No fevers or chills.  No rash or skin itching.  No pain at great toe at this time.  No nausea, diarrhea.  Her oral intake is good.    Intake/Output Summary (Last 24 hours) at 08/02/17 1254  Last data filed at 08/02/17 1001   Gross per 24 hour   Intake              240 ml   Output                0 ml   Net              240 ml     Allergies:   Allergies   Allergen Reactions   • Penicillins    • Tramadol      Current Scheduled Medications:     amitriptyline 150 mg Oral Nightly   aztreonam 1 g Intravenous Q8H   budesonide-formoterol 2 puff Inhalation BID - RT   cetirizine 10 mg Oral Daily   enoxaparin 40 mg Subcutaneous Nightly   fluticasone 2 spray Nasal Daily   furosemide 40 mg Oral Daily   metFORMIN 500 mg Oral TID With Meals   metoprolol tartrate 100 mg Oral Q12H   oxyCODONE-acetaminophen 1 tablet Oral TID   pantoprazole 40 mg Oral Q AM   PARoxetine 15 mg Oral Daily   pregabalin 100 mg Oral TID   tiZANidine 4 mg Oral TID   vancomycin 15 mg/kg Intravenous Q12H     Current PRN Medications:  •  albuterol  •  lidocaine  •  propranolol    Review of Systems see HPI.  No cardiopulmonary complaints.    Vital Signs:  /80 (BP Location: Left arm, Patient Position: Sitting)  Pulse 84  Temp 97.7 °F (36.5 °C) (Oral)   Resp 18  Ht 69\" (175.3 cm)  Wt 225 lb 5 oz (102 kg)  LMP  (LMP Unknown) Comment: tubal ligation  SpO2 98%  BMI 33.27 kg/m2    Physical Exam  Vital signs reviewed.  Right great toe gout dressing removed.  Abscess area improved.  Cellulitis resolving.  There does not appear to be any undrained fluctuant area.  Skin without rash  Line/IV (peripheral) site: No erythema, warmth, induration, or " tenderness.    Lab Results:  CBC:   Results from last 7 days  Lab Units 07/31/17  1558   WBC 10*3/mm3 7.81   HEMOGLOBIN g/dL 12.1   HEMATOCRIT % 36.2*   PLATELETS 10*3/mm3 241     BMP:  Results from last 7 days  Lab Units 07/31/17  1558   SODIUM mmol/L 140   POTASSIUM mmol/L 3.8   CHLORIDE mmol/L 105   CO2 mmol/L 22.0*   BUN mg/dL 13   CREATININE mg/dL 0.62   GLUCOSE mg/dL 176*   CALCIUM mg/dL 8.8   ALT (SGPT) U/L 61*     Culture Results:   Blood Culture   Date Value Ref Range Status   07/31/2017 No growth at 24 hours  Preliminary   07/31/2017 No growth at 24 hours  Preliminary     Right foot abscess cultures:  Staphylococcus aureus, MRSA       MARIXA     Clindamycin <=0.25 ug/ml Susceptible     Erythromycin <=0.25 ug/ml Susceptible     Gentamicin <=0.5 ug/ml Susceptible     Inducible Clindamycin Resistance NEG  Negative     Levofloxacin 0.25 ug/ml Susceptible 1     Oxacillin >=4 ug/ml Resistant     Penicillin G >=0.5 ug/ml Resistant     Tetracycline <=1 ug/ml Susceptible     Trimethoprim + Sulfamethoxazole <=10 ug/ml Susceptible     Vancomycin <=0.5 ug/ml Susceptible      Radiology: None  Additional Studies Reviewed: None    Impression:   Right great toe abscess with cellulitis.  She has improved with antibiotic treatment and drainage.  Clindamycin would be a suitable oral antibiotic based on her above susceptibility testing.  Bactrim would be an appropriate choice as well.  At this point feel we can transition to oral antimicrobial treatment.    Recommendations:   Will discontinue her intravenous antibiotic therapy  Begin clindamycin 450 mg by mouth every 8 hours for 10 days   Okay with me for discharge when ready for release by Dr. Mauro and podiatry  Follow-up with infectious diseases when necessary    Ugo Hammonds MD

## 2017-08-02 NOTE — PROGRESS NOTES
"Progress Note  Zahida Geller  8/2/2017 1:46 PM  Subjective:   Admit Date:   7/31/2017      CC/ADMIT DX:       Interval History:   Reviewed overnight events and nursing notes.  No new complaints . She has no pain. No CP or SOA. No abdominal pain.       I have reviewed all labs/diagnostics from the last 24hrs.       ROS:   I have done a 10 point ROS and all are negative, except what is mentioned in the HPI.    Diet Regular; Consistent Carbohydrate    Medications:        amitriptyline 150 mg Oral Nightly   budesonide-formoterol 2 puff Inhalation BID - RT   cetirizine 10 mg Oral Daily   clindamycin 450 mg Oral Q8H   enoxaparin 40 mg Subcutaneous Nightly   fluticasone 2 spray Nasal Daily   furosemide 40 mg Oral Daily   metFORMIN 500 mg Oral TID With Meals   metoprolol tartrate 100 mg Oral Q12H   oxyCODONE-acetaminophen 1 tablet Oral TID   pantoprazole 40 mg Oral Q AM   PARoxetine 15 mg Oral Daily   pregabalin 100 mg Oral TID   tiZANidine 4 mg Oral TID           Objective:   Vitals: /80 (BP Location: Right arm, Patient Position: Lying)  Pulse 88  Temp 97.3 °F (36.3 °C) (Oral)   Resp 18  Ht 69\" (175.3 cm)  Wt 225 lb 5 oz (102 kg)  LMP  (LMP Unknown) Comment: tubal ligation  SpO2 98%  BMI 33.27 kg/m2   Intake/Output Summary (Last 24 hours) at 08/02/17 1346  Last data filed at 08/02/17 1001   Gross per 24 hour   Intake              240 ml   Output                0 ml   Net              240 ml     General appearance: alert and cooperative with exam  Lungs: clear to auscultation bilaterally  Heart: regular rate and rhythm, S1, S2 normal, no murmur, click, rub or gallop  Abdomen: soft, non-tender; bowel sounds normal; no masses,  no organomegaly  Extremities: extremities normal, atraumatic, no cyanosis or edema  Neurologic:  No obvious focal neurologic deficits.    Assessment and Plan:   Active Problems:    Cellulitis of great toe    H/O TBI    GERD    DM2    Plan:  1.  Continue present medication(s)   2.  Follow " with PO antibiotics today and wound treatment  3.  Plan for d/c tomorrow if stable and ok with others      Discharge planning:   her home     Reviewed treatment plans with the patient and/or family.             Electronically signed by Judith Mauro MD on 8/2/2017 at 1:46 PM

## 2017-08-02 NOTE — DISCHARGE INSTR - ACTIVITY
Activity as tolerated      Apply betadine to right foot wound once a day and cover with guaze per dr. doherty
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
## 2017-08-02 NOTE — PROGRESS NOTES
"Pharmacy Antimicrobial Dosing Service  Vancomycin    Zahida Geller is a 41 y.o. female  [Ht: 69\" (175.3 cm); Wt: 225 lb 5 oz (102 kg)] Body mass index is 33.27 kg/(m^2).    Estimated Creatinine Clearance:  \"100\" mL/min (by C-G formula based on Cr of 0.62).   Lab Results   Component Value Date    CREATININE 0.62 07/31/2017      Lab Results   Component Value Date    WBC 7.81 07/31/2017      Lab Results   Component Value Date    VANCOTROUGH 9.16 (L) 08/02/2017         Microbiology Results (last 10 days)     Procedure Component Value - Date/Time    Wound Culture [[id number]]  (Abnormal)  (Susceptibility) Collected:  07/31/17 1743    Lab Status:  Final result Specimen:  Wound from Foot, Right Updated:  08/02/17 0703     Wound Culture Light growth (2+) Staphylococcus aureus, MRSA (C)        Methicillin resistant Staphylococcus aureus, Patient may be an isolation risk.        BETA LACTAMASE Positive     Gram Stain Result Few (2+) WBCs seen      Few (2+) Gram positive cocci    Susceptibility      Staphylococcus aureus, MRSA     MARIXA     Clindamycin <=0.25 ug/ml Susceptible     Erythromycin <=0.25 ug/ml Susceptible     Gentamicin <=0.5 ug/ml Susceptible     Inducible Clindamycin Resistance NEG  Negative     Levofloxacin 0.25 ug/ml Susceptible  [1]      Oxacillin >=4 ug/ml Resistant     Penicillin G >=0.5 ug/ml Resistant     Tetracycline <=1 ug/ml Susceptible     Trimethoprim + Sulfamethoxazole <=10 ug/ml Susceptible     Vancomycin <=0.5 ug/ml Susceptible            [1]   Staphylococcus species may develop resistance during prolonged therapy with quinolones. Isolates that are initially susceptible may become resistant within three to four days after initiation of therapy. Testing of repeat isolates may be warranted.              Susceptibility Comments     Staphylococcus aureus, MRSA      This isolate does not demonstrate inducible clindamycin resistance in vitro.               Blood Culture [[id number]]  (Normal) Collected:  " 07/31/17 1600    Lab Status:  Preliminary result Specimen:  Blood from Arm, Left Updated:  08/01/17 1701     Blood Culture No growth at 24 hours    Blood Culture [[id number]]  (Normal) Collected:  07/31/17 1550    Lab Status:  Preliminary result Specimen:  Blood from Arm, Right Updated:  08/01/17 1701     Blood Culture No growth at 24 hours          Current vancomycin dose:  1250 mg IVPB every 12 hours, day 2 of therapy.    Indication for use: SSTI    Complicating factor(s):   Diabetes  Obesity    Adjusted vancomycin dose: 1500 mg IVPB every 12 hours    Assessment/Plan:  Trough level returned below desired therapeutic range; level obtained 10.5 hours post dose so true trough likely below 8 mcg/mL. Dosage adjusted to maintain adequate AUC:MARIXA based on pharmacokinetic parameters. Pharmacy will continue daily follow-up evaluation.    Caesar Lu RPH PharmD  08/02/17 8:55 AM

## 2017-08-02 NOTE — PLAN OF CARE
Problem: Patient Care Overview (Adult)  Goal: Plan of Care Review  Outcome: Ongoing (interventions implemented as appropriate)    08/02/17 0414   Coping/Psychosocial Response Interventions   Plan Of Care Reviewed With patient   Patient Care Overview   Progress no change   Outcome Evaluation   Outcome Summary/Follow up Plan DRESSING REMAINS C/D/I. NO AGITATION NOTED THIS SHIFT. IV ABX CONTINUE. CONTINUE TO MONITOR.          Problem: Cellulitis (Adult)  Goal: Signs and Symptoms of Listed Potential Problems Will be Absent or Manageable (Cellulitis)  Outcome: Ongoing (interventions implemented as appropriate)    08/02/17 0414   Cellulitis   Problems Assessed (Cellulitis) all   Problems Present (Cellulitis) pain;infection

## 2017-08-05 LAB
BACTERIA SPEC AEROBE CULT: NORMAL
BACTERIA SPEC AEROBE CULT: NORMAL

## 2017-08-08 ENCOUNTER — APPOINTMENT (OUTPATIENT)
Dept: WOUND CARE | Facility: HOSPITAL | Age: 42
End: 2017-08-08

## 2017-08-08 ENCOUNTER — OFFICE VISIT (OUTPATIENT)
Dept: PODIATRY | Facility: CLINIC | Age: 42
End: 2017-08-08

## 2017-08-08 VITALS
SYSTOLIC BLOOD PRESSURE: 98 MMHG | WEIGHT: 226 LBS | OXYGEN SATURATION: 99 % | HEART RATE: 76 BPM | DIASTOLIC BLOOD PRESSURE: 72 MMHG | HEIGHT: 69 IN | BODY MASS INDEX: 33.47 KG/M2

## 2017-08-08 DIAGNOSIS — S06.9X9S TBI (TRAUMATIC BRAIN INJURY), WITH LOSS OF CONSCIOUSNESS OF UNSPECIFIED DURATION, SEQUELA: ICD-10-CM

## 2017-08-08 DIAGNOSIS — E11.49 DIABETES MELLITUS TYPE 2 WITH NEUROLOGICAL MANIFESTATIONS (HCC): ICD-10-CM

## 2017-08-08 DIAGNOSIS — S91.109D OPEN WOUND OF TOE, SUBSEQUENT ENCOUNTER: Primary | ICD-10-CM

## 2017-08-08 PROCEDURE — 97597 DBRDMT OPN WND 1ST 20 CM/<: CPT | Performed by: PODIATRIST

## 2017-08-08 PROCEDURE — 99213 OFFICE O/P EST LOW 20 MIN: CPT | Performed by: PODIATRIST

## 2017-08-08 NOTE — PROGRESS NOTES
[facility] - PODIATRY    Today's Date: 17    Patient Name: Zahida Geller  MRN: [id number]  CSN: [id number]  PCP: Judith Mauro MD  Referring Provider: No ref. provider found    SUBJECTIVE     Chief Complaint   Patient presents with   • Right Foot - Pain     Patient is complaining of pain in right great toe, 3/10 on a pain scale. She describes the pain as dull.      HPI: Zahida Geller, a 41 y.o.female, comes to clinic as a(n) established patient for follow-up treatment of right foot infection and wound. Patient has h/o anxiety, asthma, back pain, depression, DM2, edema, HTN, TBI, previous substance abuse. Patient was admitted for right foot cellulitis and abscess, underwent bedside I&D and has been given PO abx upon discharge. Notes improved redness and pain to site. Has been applying betadine to wound daily. Patient is NIDDM with last stated BG level of 177mg/dl. Admits pain at 3/10 level and described as dull. Denies any constitutional symptoms. No other pedal complaints at this time.    Past Medical History:   Diagnosis Date   • Anxiety    • Asthma    • Chronic back pain    • Chronic hip pain    • Depression    • Diabetes mellitus    • Edema of both legs    • Hypertension    • Restless leg syndrome    • Substance abuse in remission    • Traumatic brain injury      Past Surgical History:   Procedure Laterality Date   • BACK SURGERY      L5-L6 surgery   •  SECTION     • LEG SURGERY      Left leg jose ramon placement   • PEG TUBE INSERTION     • PEG TUBE REMOVAL     • TRACHEOSTOMY      with reversal   • TUBAL ABDOMINAL LIGATION       History reviewed. No pertinent family history.  Social History     Social History   • Marital status: Single     Spouse name: N/A   • Number of children: N/A   • Years of education: N/A     Occupational History   • Not on file.     Social History Main Topics   • Smoking status: Current Every Day Smoker   • Smokeless tobacco: Never Used   • Alcohol use No    • Drug use: Yes      Comment: history of, narcotics   • Sexual activity: No     Other Topics Concern   • Not on file     Social History Narrative     Allergies   Allergen Reactions   • Penicillins    • Tramadol      Current Outpatient Prescriptions   Medication Sig Dispense Refill   • albuterol (PROVENTIL HFA;VENTOLIN HFA) 108 (90 BASE) MCG/ACT inhaler Inhale 2 puffs Every 4 (Four) Hours As Needed for Wheezing.     • amitriptyline (ELAVIL) 150 MG tablet Take 150 mg by mouth Every Night.     • budesonide-formoterol (SYMBICORT) 80-4.5 MCG/ACT inhaler Inhale 2 puffs 2 (Two) Times a Day.     • esomeprazole (nexIUM) 40 MG capsule Take 40 mg by mouth Every Morning Before Breakfast.     • fluticasone (FLONASE) 50 MCG/ACT nasal spray 2 sprays into each nostril Daily.     • furosemide (LASIX) 40 MG tablet Take 40 mg by mouth Daily.     • ibuprofen (ADVIL,MOTRIN) 800 MG tablet Take 800 mg by mouth 3 (Three) Times a Day.     • lidocaine (LIDODERM) 5 % Place 1 patch on the skin Daily As Needed for Moderate Pain (4-6). Remove & Discard patch within 12 hours or as directed by MD     • Loratadine (CLARITIN) 10 MG capsule Take 10 mg by mouth Daily.     • medroxyPROGESTERone (DEPO-PROVERA) 150 MG/ML injection Inject 150 mg into the shoulder, thigh, or buttocks Every 3 (Three) Months.     • metFORMIN (GLUCOPHAGE) 500 MG tablet Take 500 mg by mouth 3 (Three) Times a Day With Meals.     • metoprolol tartrate (LOPRESSOR) 100 MG tablet Take 100 mg by mouth 2 (Two) Times a Day.     • Multiple Vitamin (THERA PO) Take 1 tablet by mouth Daily.     • nateglinide (STARLIX) 60 MG tablet Take 60 mg by mouth 3 (Three) Times a Day Before Meals.     • oxyCODONE-acetaminophen (PERCOCET)  MG per tablet Take 1 tablet by mouth 3 (Three) Times a Day.     • PARoxetine (PAXIL) 30 MG tablet Take 15 mg by mouth Every Morning.     • pregabalin (LYRICA) 100 MG capsule Take 100 mg by mouth 3 (Three) Times a Day.     • propranolol (INDERAL) 20 MG tablet  Take 20 mg by mouth Every 6 (Six) Hours As Needed (PRN anxiety).     • tiZANidine (ZANAFLEX) 4 MG tablet Take 4 mg by mouth 3 (Three) Times a Day.       No current facility-administered medications for this visit.      Review of Systems   Constitutional: Negative for chills and fever.   HENT: Negative for congestion.    Respiratory: Negative for shortness of breath.    Cardiovascular: Positive for leg swelling. Negative for chest pain.   Gastrointestinal: Negative for constipation, diarrhea, nausea and vomiting.   Skin: Positive for wound.   Neurological: Positive for numbness.       OBJECTIVE     Vitals:    08/08/17 0910   BP: 98/72   Pulse: 76   SpO2: 99%       PHYSICAL EXAM  GEN:   A&Ox3, NAD. Pt presents to clinic ambulating without assistance and wearing Casual Shoes.     NEURO:   Protective sensation intact to 9/10 sites Right foot, 9/10 site Left foot using Kent-Luis monofilament  Light touch sensation diminished  No Tinel's or Villeux sign.    VASC:  Skin temperature Warm to Warm proximal to distal gricel  DP pulses 2/4 Right, 2/4 Left  PT pulses 2/4 Right, 2/4 Left  CFT <3 sec gricel  Pedal hair growth absent  trace edema noted gricel  Varicosities absent gricel    MUSC/SKEL:  Muscle Strength Right foot Dorsiflexors 5/5, Plantarflexors 5/5, Evertors 5/5, Invertors 5/5  Muscle Strength Left foot Dorsiflexors 5/5, Plantarflexors 5/5, Evertors 5/5, Invertors 5/5  ROM of the 1st MTP is full without pain or crepitus  ROM of the MTJ is full without pain or crepitus    ROM of the STJ is full without pain or crepitus    ROM of the ankle joint is full without pain or crepitus    POP of right hallux  Rectus foot type   No gross pedal musculoskeletal deformities noted.     DERM:  Pedal nails x10 are within normal limits of length and thickness  Webspaces are Clean, Dry, and Intact  Skin is normal in turgor and texture   Wound noted to dorsal aspect of right 1st MTPJ measuring 2.0cm x 1.8cm. Wound is fully granular and  epithelializing at margins. Minimal erythema. No purulence, malodor, or probing.       RADIOLOGY/NUCLEAR:  Xr Foot 2 View Right    Result Date: 7/31/2017  Narrative: EXAMINATION: XR FOOT 2 VW RIGHT-  7/31/2017 7:34 PM CDT  HISTORY: Right toe infection.  COMPARISON: None.  TECHNIQUE: 2 views.  AP and lateral projection imaging.  FINDINGS:  The bony structures are intact without fracture.  The joint spaces are maintained.  There is no abnormal soft tissue gas.  There is no periostitis or erosive changes or plain film evidence of osteomyelitis.  Soft tissue swelling along the dorsum of the forefoot is suspected on the lateral view.      Impression: 1. No acute bony abnormality. 2. Soft tissue swelling. This report was finalized on 07/31/2017 20:38 by Dr. Terry Valadez MD.      LABORATORY/CULTURE RESULTS:      PATHOLOGY RESULTS:       ASSESSMENT/PLAN     Zahida was seen today for pain.    Diagnoses and all orders for this visit:    Open wound of toe, subsequent encounter    Diabetes mellitus type 2 with neurological manifestations    TBI (traumatic brain injury), with loss of consciousness of unspecified duration, sequela      Comprehensive lower extremity examination and evaluation was performed.  Discussed findings and treatment plan including risks, benefits, and treatment options with patient in detail. Patient agreed with treatment plan.  After verbal consent obtained, selective debridment performed to remove skin, slough and non-viable tissue utilizing dermal curette and/or scapel. Hemostasis achieved with compression. Applied bandage of betadine, telfa, and bandaid. To reapply bandage daily.  Continue BG control.  An After Visit Summary was printed and given to the patient at discharge, including (if requested) any available informative/educational handouts regarding diagnosis, treatment, or medications. All questions were answered to patient/family satisfaction. Should symptoms fail to improve or worsen they  agree to call or return to clinic or to go to the Emergency Department. Discussed the importance of following up with any needed screening tests/labs/specialist appointments and any requested follow-up recommended by me today. Importance of maintaining follow-up discussed and patient accepts that missed appointments can delay diagnosis and potentially lead to worsening of conditions.  Return in about 1 week (around 8/15/2017)., or sooner if acute issues arise.        This document has been electronically signed by Christopher Dennis DPM on August 8, 2017 9:34 AM

## 2017-08-14 ENCOUNTER — OFFICE VISIT (OUTPATIENT)
Dept: PODIATRY | Facility: CLINIC | Age: 42
End: 2017-08-14

## 2017-08-14 VITALS
DIASTOLIC BLOOD PRESSURE: 76 MMHG | SYSTOLIC BLOOD PRESSURE: 132 MMHG | HEART RATE: 92 BPM | HEIGHT: 69 IN | OXYGEN SATURATION: 98 % | WEIGHT: 225 LBS | BODY MASS INDEX: 33.33 KG/M2

## 2017-08-14 DIAGNOSIS — E11.49 DIABETES MELLITUS TYPE 2 WITH NEUROLOGICAL MANIFESTATIONS (HCC): ICD-10-CM

## 2017-08-14 DIAGNOSIS — S91.109D OPEN WOUND OF TOE, SUBSEQUENT ENCOUNTER: Primary | ICD-10-CM

## 2017-08-14 DIAGNOSIS — S06.9X9S TBI (TRAUMATIC BRAIN INJURY), WITH LOSS OF CONSCIOUSNESS OF UNSPECIFIED DURATION, SEQUELA: ICD-10-CM

## 2017-08-14 PROCEDURE — 99212 OFFICE O/P EST SF 10 MIN: CPT | Performed by: PODIATRIST

## 2017-08-14 NOTE — PATIENT INSTRUCTIONS
Diabetes and Foot Care  Diabetes may cause you to have problems because of poor blood supply (circulation) to your feet and legs. This may cause the skin on your feet to become thinner, break easier, and heal more slowly. Your skin may become dry, and the skin may peel and crack. You may also have nerve damage in your legs and feet causing decreased feeling in them. You may not notice minor injuries to your feet that could lead to infections or more serious problems. Taking care of your feet is one of the most important things you can do for yourself.  HOME CARE INSTRUCTIONS  · Wear shoes at all times, even in the house. Do not go barefoot. Bare feet are easily injured.  · Check your feet daily for blisters, cuts, and redness. If you cannot see the bottom of your feet, use a mirror or ask someone for help.  · Wash your feet with warm water (do not use hot water) and mild soap. Then pat your feet and the areas between your toes until they are completely dry. Do not soak your feet as this can dry your skin.  · Apply a moisturizing lotion or petroleum jelly (that does not contain alcohol and is unscented) to the skin on your feet and to dry, brittle toenails. Do not apply lotion between your toes.  · Trim your toenails straight across. Do not dig under them or around the cuticle. File the edges of your nails with an emery board or nail file.  · Do not cut corns or calluses or try to remove them with medicine.  · Wear clean socks or stockings every day. Make sure they are not too tight. Do not wear knee-high stockings since they may decrease blood flow to your legs.  · Wear shoes that fit properly and have enough cushioning. To break in new shoes, wear them for just a few hours a day. This prevents you from injuring your feet. Always look in your shoes before you put them on to be sure there are no objects inside.  · Do not cross your legs. This may decrease the blood flow to your feet.  · If you find a minor scrape,  cut, or break in the skin on your feet, keep it and the skin around it clean and dry. These areas may be cleansed with mild soap and water. Do not cleanse the area with peroxide, alcohol, or iodine.  · When you remove an adhesive bandage, be sure not to damage the skin around it.  · If you have a wound, look at it several times a day to make sure it is healing.  · Do not use heating pads or hot water bottles. They may burn your skin. If you have lost feeling in your feet or legs, you may not know it is happening until it is too late.  · Make sure your health care provider performs a complete foot exam at least annually or more often if you have foot problems. Report any cuts, sores, or bruises to your health care provider immediately.  SEEK MEDICAL CARE IF:   · You have an injury that is not healing.  · You have cuts or breaks in the skin.  · You have an ingrown nail.  · You notice redness on your legs or feet.  · You feel burning or tingling in your legs or feet.  · You have pain or cramps in your legs and feet.  · Your legs or feet are numb.  · Your feet always feel cold.  SEEK IMMEDIATE MEDICAL CARE IF:   · There is increasing redness, swelling, or pain in or around a wound.  · There is a red line that goes up your leg.  · Pus is coming from a wound.  · You develop a fever or as directed by your health care provider.  · You notice a bad smell coming from an ulcer or wound.     This information is not intended to replace advice given to you by your health care provider. Make sure you discuss any questions you have with your health care provider.     Document Released: 12/15/2001 Document Revised: 04/10/2017 Document Reviewed: 05/27/2014  [employer] Interactive Patient Education ©2017 [employer]

## 2017-08-14 NOTE — PROGRESS NOTES
[facility] - PODIATRY    Today's Date: 17    Patient Name: Zahida Geller  MRN: [id number]  CSN: [id number]  PCP: Judith Mauro MD  Referring Provider: No ref. provider found    SUBJECTIVE     Chief Complaint   Patient presents with   • Right Foot - Pain, Wound Check, Follow-up     Patient is complaining of pain x 1 week. 1/10 on a pain. She describes the pain as dull. BG was 158 this morning.    • Diabetes     HPI: Zahida Geller, a 41 y.o.female, comes to clinic as a(n) established patient for follow-up treatment of right foot infection and wound. Patient has h/o anxiety, asthma, back pain, depression, DM2, edema, HTN, TBI, previous substance abuse. Patient was admitted for right foot cellulitis and abscess, underwent bedside I&D and has been given PO abx upon discharge which she has completed. Notes improved redness and pain to site. Has been applying betadine to wound daily. Patient is NIDDM with last stated BG level of 158mg/dl. Admits pain at 1/10 level and described as dull. Denies any constitutional symptoms. No other pedal complaints at this time.    Past Medical History:   Diagnosis Date   • Anxiety    • Asthma    • Chronic back pain    • Chronic hip pain    • Depression    • Diabetes mellitus    • Edema of both legs    • Hypertension    • Restless leg syndrome    • Substance abuse in remission    • Traumatic brain injury      Past Surgical History:   Procedure Laterality Date   • BACK SURGERY      L5-L6 surgery   •  SECTION     • LEG SURGERY      Left leg jose ramon placement   • PEG TUBE INSERTION     • PEG TUBE REMOVAL     • TRACHEOSTOMY      with reversal   • TUBAL ABDOMINAL LIGATION       Family History   Problem Relation Age of Onset   • Heart murmur Mother    • Heart disease Father      Social History     Social History   • Marital status: Single     Spouse name: N/A   • Number of children: N/A   • Years of education: N/A     Occupational History   • Not on file.      Social History Main Topics   • Smoking status: Former Smoker     Types: Cigarettes     Quit date: 8/13/2017   • Smokeless tobacco: Never Used   • Alcohol use No   • Drug use: Yes      Comment: history of, narcotics   • Sexual activity: No     Other Topics Concern   • Not on file     Social History Narrative     Allergies   Allergen Reactions   • Penicillins    • Tramadol      Current Outpatient Prescriptions   Medication Sig Dispense Refill   • albuterol (PROVENTIL HFA;VENTOLIN HFA) 108 (90 BASE) MCG/ACT inhaler Inhale 2 puffs Every 4 (Four) Hours As Needed for Wheezing.     • amitriptyline (ELAVIL) 150 MG tablet Take 150 mg by mouth Every Night.     • budesonide-formoterol (SYMBICORT) 80-4.5 MCG/ACT inhaler Inhale 2 puffs 2 (Two) Times a Day.     • esomeprazole (nexIUM) 40 MG capsule Take 40 mg by mouth Every Morning Before Breakfast.     • fluticasone (FLONASE) 50 MCG/ACT nasal spray 2 sprays into each nostril Daily.     • furosemide (LASIX) 40 MG tablet Take 40 mg by mouth Daily.     • ibuprofen (ADVIL,MOTRIN) 800 MG tablet Take 800 mg by mouth 3 (Three) Times a Day.     • lidocaine (LIDODERM) 5 % Place 1 patch on the skin Daily As Needed for Moderate Pain (4-6). Remove & Discard patch within 12 hours or as directed by MD     • Loratadine (CLARITIN) 10 MG capsule Take 10 mg by mouth Daily.     • medroxyPROGESTERone (DEPO-PROVERA) 150 MG/ML injection Inject 150 mg into the shoulder, thigh, or buttocks Every 3 (Three) Months.     • metFORMIN (GLUCOPHAGE) 500 MG tablet Take 500 mg by mouth 3 (Three) Times a Day With Meals.     • metoprolol tartrate (LOPRESSOR) 100 MG tablet Take 100 mg by mouth 2 (Two) Times a Day.     • Multiple Vitamin (THERA PO) Take 1 tablet by mouth Daily.     • nateglinide (STARLIX) 60 MG tablet Take 60 mg by mouth 3 (Three) Times a Day Before Meals.     • oxyCODONE-acetaminophen (PERCOCET)  MG per tablet Take 1 tablet by mouth 3 (Three) Times a Day.     • PARoxetine (PAXIL) 30 MG  tablet Take 15 mg by mouth Every Morning.     • pregabalin (LYRICA) 100 MG capsule Take 100 mg by mouth 3 (Three) Times a Day.     • propranolol (INDERAL) 20 MG tablet Take 20 mg by mouth Every 6 (Six) Hours As Needed (PRN anxiety).     • tiZANidine (ZANAFLEX) 4 MG tablet Take 4 mg by mouth 3 (Three) Times a Day.       No current facility-administered medications for this visit.      Review of Systems   Constitutional: Negative for chills and fever.   HENT: Negative for congestion.    Respiratory: Negative for shortness of breath.    Cardiovascular: Positive for leg swelling. Negative for chest pain.   Gastrointestinal: Negative for constipation, diarrhea, nausea and vomiting.   Skin: Positive for wound.   Neurological: Positive for numbness.       OBJECTIVE     Vitals:    08/14/17 1314   BP: 132/76   Pulse: 92   SpO2: 98%       PHYSICAL EXAM  GEN:   A&Ox3, NAD. Pt presents to clinic ambulating without assistance and wearing Casual Shoes. Accompanied by friend.     NEURO:   Protective sensation intact to 9/10 sites Right foot, 9/10 site Left foot using [place] monofilament  Light touch sensation diminished  No Tinel's or Villeux sign.    VASC:  Skin temperature Warm to Warm proximal to distal gricel  DP pulses 2/4 Right, 2/4 Left  PT pulses 2/4 Right, 2/4 Left  CFT <3 sec gricel  Pedal hair growth absent  trace edema noted gricel  Varicosities absent gricel    MUSC/SKEL:  Muscle Strength Right foot Dorsiflexors 5/5, Plantarflexors 5/5, Evertors 5/5, Invertors 5/5  Muscle Strength Left foot Dorsiflexors 5/5, Plantarflexors 5/5, Evertors 5/5, Invertors 5/5  ROM of the 1st MTP is full without pain or crepitus  ROM of the MTJ is full without pain or crepitus    ROM of the STJ is full without pain or crepitus    ROM of the ankle joint is full without pain or crepitus    Minimal to no POP of right hallux  Rectus foot type   No gross pedal musculoskeletal deformities noted.     DERM:  Pedal nails x10 are within normal limits  of length and thickness  Webspaces are Clean, Dry, and Intact  Skin is normal in turgor and texture   Wound noted to dorsal aspect of right 1st MTPJ. At this visit, fully epithelialized. Minimal erythema. No purulence, malodor, or probing.       RADIOLOGY/NUCLEAR:  Xr Foot 2 View Right    Result Date: 7/31/2017  Narrative: EXAMINATION: XR FOOT 2 VW RIGHT-  7/31/2017 7:34 PM CDT  HISTORY: Right toe infection.  COMPARISON: None.  TECHNIQUE: 2 views.  AP and lateral projection imaging.  FINDINGS:  The bony structures are intact without fracture.  The joint spaces are maintained.  There is no abnormal soft tissue gas.  There is no periostitis or erosive changes or plain film evidence of osteomyelitis.  Soft tissue swelling along the dorsum of the forefoot is suspected on the lateral view.      Impression: 1. No acute bony abnormality. 2. Soft tissue swelling. This report was finalized on 07/31/2017 20:38 by Dr. Terry Valadez MD.      LABORATORY/CULTURE RESULTS:      PATHOLOGY RESULTS:       ASSESSMENT/PLAN     Zahida was seen today for diabetes, pain, wound check and follow-up.    Diagnoses and all orders for this visit:    Open wound of toe, subsequent encounter    Diabetes mellitus type 2 with neurological manifestations    TBI (traumatic brain injury), with loss of consciousness of unspecified duration, sequela      Comprehensive lower extremity examination and evaluation was performed.  Discussed findings and treatment plan including risks, benefits, and treatment options with patient in detail. Patient agreed with treatment plan.  No debridement this visit needed. Continue betadine and bandaid for 1 additional week to strengthen skin.   Continue BG control.  An After Visit Summary was printed and given to the patient at discharge, including (if requested) any available informative/educational handouts regarding diagnosis, treatment, or medications. All questions were answered to patient/family satisfaction. Should  symptoms fail to improve or worsen they agree to call or return to clinic or to go to the Emergency Department. Discussed the importance of following up with any needed screening tests/labs/specialist appointments and any requested follow-up recommended by me today. Importance of maintaining follow-up discussed and patient accepts that missed appointments can delay diagnosis and potentially lead to worsening of conditions.  Return if symptoms worsen or fail to improve., or sooner if acute issues arise.        This document has been electronically signed by Christopher Dennis DPM on August 14, 2017 1:43 PM

## 2017-09-27 ENCOUNTER — OFFICE VISIT (OUTPATIENT)
Dept: OBSTETRICS AND GYNECOLOGY | Facility: CLINIC | Age: 42
End: 2017-09-27

## 2017-09-27 VITALS
HEIGHT: 69 IN | WEIGHT: 220 LBS | SYSTOLIC BLOOD PRESSURE: 128 MMHG | DIASTOLIC BLOOD PRESSURE: 66 MMHG | BODY MASS INDEX: 32.58 KG/M2

## 2017-09-27 DIAGNOSIS — F17.200 SMOKER: ICD-10-CM

## 2017-09-27 DIAGNOSIS — F32.A ANXIETY AND DEPRESSION: ICD-10-CM

## 2017-09-27 DIAGNOSIS — F41.9 ANXIETY AND DEPRESSION: ICD-10-CM

## 2017-09-27 DIAGNOSIS — Z12.39 SCREENING FOR BREAST CANCER: ICD-10-CM

## 2017-09-27 DIAGNOSIS — Z01.419 VISIT FOR GYNECOLOGIC EXAMINATION: Primary | ICD-10-CM

## 2017-09-27 PROCEDURE — 87624 HPV HI-RISK TYP POOLED RSLT: CPT | Performed by: NURSE PRACTITIONER

## 2017-09-27 PROCEDURE — G0123 SCREEN CERV/VAG THIN LAYER: HCPCS | Performed by: NURSE PRACTITIONER

## 2017-09-27 PROCEDURE — 99386 PREV VISIT NEW AGE 40-64: CPT | Performed by: NURSE PRACTITIONER

## 2017-09-27 RX ORDER — ESOMEPRAZOLE MAGNESIUM 40 MG/1
FOR SUSPENSION ORAL
COMMUNITY
End: 2019-02-27

## 2017-09-27 RX ORDER — PROPRANOLOL HYDROCHLORIDE 20 MG/1
TABLET ORAL
COMMUNITY
End: 2018-11-13 | Stop reason: SDUPTHER

## 2017-09-27 RX ORDER — IBUPROFEN 800 MG/1
TABLET ORAL
COMMUNITY
End: 2018-02-15

## 2017-09-27 RX ORDER — TIZANIDINE 4 MG/1
TABLET ORAL
COMMUNITY
End: 2018-11-13 | Stop reason: SDUPTHER

## 2017-09-27 RX ORDER — BUDESONIDE AND FORMOTEROL FUMARATE DIHYDRATE 80; 4.5 UG/1; UG/1
AEROSOL RESPIRATORY (INHALATION)
COMMUNITY
End: 2018-11-13 | Stop reason: SDUPTHER

## 2017-09-27 RX ORDER — LIDOCAINE 50 MG/G
PATCH TOPICAL
COMMUNITY
End: 2018-02-15

## 2017-09-27 RX ORDER — METOPROLOL TARTRATE 100 MG/1
TABLET ORAL
COMMUNITY
End: 2018-11-13 | Stop reason: SDUPTHER

## 2017-09-27 RX ORDER — LORATADINE 10 MG/1
TABLET ORAL
COMMUNITY
End: 2018-11-13 | Stop reason: SDUPTHER

## 2017-09-27 RX ORDER — OXYCODONE AND ACETAMINOPHEN 10; 325 MG/1; MG/1
TABLET ORAL
COMMUNITY
End: 2018-02-15

## 2017-09-27 RX ORDER — PREGABALIN 100 MG/1
CAPSULE ORAL
COMMUNITY
End: 2018-11-13 | Stop reason: SDUPTHER

## 2017-09-27 RX ORDER — AMITRIPTYLINE HYDROCHLORIDE 50 MG/1
150 TABLET, FILM COATED ORAL NIGHTLY
COMMUNITY

## 2017-09-27 RX ORDER — FLUTICASONE PROPIONATE 50 MCG
SPRAY, SUSPENSION (ML) NASAL
COMMUNITY
End: 2018-11-13 | Stop reason: SDUPTHER

## 2017-09-27 RX ORDER — MEDROXYPROGESTERONE ACETATE 150 MG/ML
INJECTION, SUSPENSION INTRAMUSCULAR
COMMUNITY
End: 2018-11-13 | Stop reason: SDUPTHER

## 2017-09-27 RX ORDER — NATEGLINIDE 60 MG/1
TABLET ORAL
COMMUNITY
End: 2018-11-13 | Stop reason: SDUPTHER

## 2017-09-27 RX ORDER — ALBUTEROL SULFATE 90 UG/1
AEROSOL, METERED RESPIRATORY (INHALATION)
COMMUNITY
End: 2018-11-13 | Stop reason: SDUPTHER

## 2017-09-27 RX ORDER — FUROSEMIDE 40 MG/1
TABLET ORAL
COMMUNITY
End: 2018-11-13 | Stop reason: SDUPTHER

## 2017-09-27 RX ORDER — PAROXETINE 30 MG/1
TABLET, FILM COATED ORAL
COMMUNITY
End: 2018-11-13 | Stop reason: SDUPTHER

## 2017-09-27 NOTE — PATIENT INSTRUCTIONS
Steps to Quit Smoking   Smoking tobacco can be harmful to your health and can affect almost every organ in your body. Smoking puts you, and those around you, at risk for developing many serious chronic diseases. Quitting smoking is difficult, but it is one of the best things that you can do for your health. It is never too late to quit.  WHAT ARE THE BENEFITS OF QUITTING SMOKING?  When you quit smoking, you lower your risk of developing serious diseases and conditions, such as:  · Lung cancer or lung disease, such as COPD.  · Heart disease.  · Stroke.  · Heart attack.  · Infertility.  · Osteoporosis and bone fractures.  Additionally, symptoms such as coughing, wheezing, and shortness of breath may get better when you quit. You may also find that you get sick less often because your body is stronger at fighting off colds and infections. If you are pregnant, quitting smoking can help to reduce your chances of having a baby of low birth weight.  HOW DO I GET READY TO QUIT?  When you decide to quit smoking, create a plan to make sure that you are successful. Before you quit:  · Pick a date to quit. Set a date within the next two weeks to give you time to prepare.  · Write down the reasons why you are quitting. Keep this list in places where you will see it often, such as on your bathroom mirror or in your car or wallet.  · Identify the people, places, things, and activities that make you want to smoke (triggers) and avoid them. Make sure to take these actions:    Throw away all cigarettes at home, at work, and in your car.    Throw away smoking accessories, such as ashtrays and lighters.    Clean your car and make sure to empty the ashtray.    Clean your home, including curtains and carpets.  · Tell your family, friends, and coworkers that you are quitting. Support from your loved ones can make quitting easier.  · Talk with your health care provider about your options for quitting smoking.  · Find out what treatment  "options are covered by your health insurance.  WHAT STRATEGIES CAN I USE TO QUIT SMOKING?   Talk with your healthcare provider about different strategies to quit smoking. Some strategies include:  · Quitting smoking altogether instead of gradually lessening how much you smoke over a period of time. Research shows that quitting \"cold turkey\" is more successful than gradually quitting.  · Attending in-person counseling to help you build problem-solving skills. You are more likely to have success in quitting if you attend several counseling sessions. Even short sessions of 10 minutes can be effective.  · Finding resources and support systems that can help you to quit smoking and remain smoke-free after you quit. These resources are most helpful when you use them often. They can include:    Online chats with a counselor.    Telephone quitlines.    Printed self-help materials.    Support groups or group counseling.    Text messaging programs.    Mobile phone applications.  · Taking medicines to help you quit smoking. (If you are pregnant or breastfeeding, talk with your health care provider first.) Some medicines contain nicotine and some do not. Both types of medicines help with cravings, but the medicines that include nicotine help to relieve withdrawal symptoms. Your health care provider may recommend:    Nicotine patches, gum, or lozenges.    Nicotine inhalers or sprays.    Non-nicotine medicine that is taken by mouth.  Talk with your health care provider about combining strategies, such as taking medicines while you are also receiving in-person counseling. Using these two strategies together makes you more likely to succeed in quitting than if you used either strategy on its own.  If you are pregnant or breastfeeding, talk with your health care provider about finding counseling or other support strategies to quit smoking. Do not take medicine to help you quit smoking unless told to do so by your health care " provider.  WHAT THINGS CAN I DO TO MAKE IT EASIER TO QUIT?  Quitting smoking might feel overwhelming at first, but there is a lot that you can do to make it easier. Take these important actions:  · Reach out to your family and friends and ask that they support and encourage you during this time. Call telephone quitlines, reach out to support groups, or work with a counselor for support.  · Ask people who smoke to avoid smoking around you.  · Avoid places that trigger you to smoke, such as bars, parties, or smoke-break areas at work.  · Spend time around people who do not smoke.  · Lessen stress in your life, because stress can be a smoking trigger for some people. To lessen stress, try:    Exercising regularly.    Deep-breathing exercises.    Yoga.    Meditating.    Performing a body scan. This involves closing your eyes, scanning your body from head to toe, and noticing which parts of your body are particularly tense. Purposefully relax the muscles in those areas.  · Download or purchase mobile phone or tablet apps (applications) that can help you stick to your quit plan by providing reminders, tips, and encouragement. There are many free apps, such as QuitGuide from the CDC (Centers for Disease Control and Prevention). You can find other support for quitting smoking (smoking cessation) through smokefree.gov and other websites.  HOW WILL I FEEL WHEN I QUIT SMOKING?  Within the first 24 hours of quitting smoking, you may start to feel some withdrawal symptoms. These symptoms are usually most noticeable 2-3 days after quitting, but they usually do not last beyond 2-3 weeks. Changes or symptoms that you might experience include:  · Mood swings.  · Restlessness, anxiety, or irritation.  · Difficulty concentrating.  · Dizziness.  · Strong cravings for sugary foods in addition to nicotine.  · Mild weight gain.  · Constipation.  · Nausea.  · Coughing or a sore throat.  · Changes in how your medicines work in your  body.  · A depressed mood.  · Difficulty sleeping (insomnia).  After the first 2-3 weeks of quitting, you may start to notice more positive results, such as:  · Improved sense of smell and taste.  · Decreased coughing and sore throat.  · Slower heart rate.  · Lower blood pressure.  · Clearer skin.  · The ability to breathe more easily.  · Fewer sick days.  Quitting smoking is very challenging for most people. Do not get discouraged if you are not successful the first time. Some people need to make many attempts to quit before they achieve long-term success. Do your best to stick to your quit plan, and talk with your health care provider if you have any questions or concerns.     This information is not intended to replace advice given to you by your health care provider. Make sure you discuss any questions you have with your health care provider.     Document Released: 12/12/2002 Document Revised: 05/03/2016 Document Reviewed: 05/03/2016  [employer] Interactive Patient Education ©2017 [employer]
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
## 2017-09-28 ENCOUNTER — HOSPITAL ENCOUNTER (OUTPATIENT)
Dept: WOMENS IMAGING | Age: 42
Discharge: HOME | End: 2017-09-28
Payer: MEDICAID

## 2017-09-28 DIAGNOSIS — Z12.31 VISIT FOR SCREENING MAMMOGRAM: ICD-10-CM

## 2017-09-28 PROCEDURE — 77063 BREAST TOMOSYNTHESIS BI: CPT

## 2017-10-10 LAB
GEN CATEG CVX/VAG CYTO-IMP: ABNORMAL
LAB AP CASE REPORT: ABNORMAL
LAB AP GYN ADDITIONAL INFORMATION: ABNORMAL
LAB AP GYN OTHER FINDINGS: ABNORMAL
Lab: ABNORMAL
PATH INTERP SPEC-IMP: ABNORMAL
STAT OF ADQ CVX/VAG CYTO-IMP: ABNORMAL

## 2017-10-11 ENCOUNTER — TELEPHONE (OUTPATIENT)
Dept: OBSTETRICS AND GYNECOLOGY | Facility: CLINIC | Age: 42
End: 2017-10-11

## 2017-10-11 NOTE — TELEPHONE ENCOUNTER
----- Message from Mary R Carrell, APRN sent at 10/11/2017  8:15 AM CDT -----  Notify pt of her atypical pap but neg HPV. Her pap smear came back atypical. This means the pap had abnormal shaped cells. This does not mean she has  cancer. In fact a reflex HPV test was done and it was negative. HPV is the virus that could cause cervical cancer. Her pap smear falls within the normal guidelines. Her only follow up is to repeat your pap smear next year.      Left message on patients voice mail to return my call  Left message at home to return my call  Will try again later.  PP      Patient notified  Per Blanca JULES.  HAWA Pereira, AJAY Thibodeaux MA                     Pt informed of pap results.

## 2017-10-11 NOTE — TELEPHONE ENCOUNTER
----- Message from Mary R Carrell, APRN sent at 10/11/2017  8:15 AM CDT -----  Notify pt of her atypical pap but neg HPV. Her pap smear came back atypical. This means the pap had abnormal shaped cells. This does not mean she has  cancer. In fact a reflex HPV test was done and it was negative. HPV is the virus that could cause cervical cancer. Her pap smear falls within the normal guidelines. Her only follow up is to repeat your pap smear next year.

## 2017-11-04 ENCOUNTER — APPOINTMENT (OUTPATIENT)
Dept: GENERAL RADIOLOGY | Age: 42
End: 2017-11-04
Payer: MEDICAID

## 2017-11-04 ENCOUNTER — APPOINTMENT (OUTPATIENT)
Dept: CT IMAGING | Age: 42
End: 2017-11-04
Payer: MEDICAID

## 2017-11-04 ENCOUNTER — HOSPITAL ENCOUNTER (EMERGENCY)
Age: 42
Discharge: HOME | End: 2017-11-05
Payer: MEDICAID

## 2017-11-04 DIAGNOSIS — R41.82 ALTERED MENTAL STATUS, UNSPECIFIED ALTERED MENTAL STATUS TYPE: Primary | ICD-10-CM

## 2017-11-04 PROCEDURE — 71010 XR CHEST PORTABLE: CPT

## 2017-11-04 PROCEDURE — 70450 CT HEAD/BRAIN W/O DYE: CPT

## 2017-11-04 PROCEDURE — 99285 EMERGENCY DEPT VISIT HI MDM: CPT

## 2017-11-04 PROCEDURE — 93005 ELECTROCARDIOGRAM TRACING: CPT

## 2017-11-05 VITALS
WEIGHT: 220 LBS | TEMPERATURE: 98 F | BODY MASS INDEX: 32.58 KG/M2 | SYSTOLIC BLOOD PRESSURE: 122 MMHG | OXYGEN SATURATION: 99 % | HEART RATE: 78 BPM | DIASTOLIC BLOOD PRESSURE: 78 MMHG | HEIGHT: 69 IN | RESPIRATION RATE: 20 BRPM

## 2017-11-05 LAB
ACETAMINOPHEN LEVEL: <15 UG/ML
ALBUMIN SERPL-MCNC: 3.7 G/DL (ref 3.5–5.2)
ALP BLD-CCNC: 93 U/L (ref 35–104)
ALT SERPL-CCNC: 31 U/L (ref 5–33)
AMMONIA: 25 UMOL/L (ref 11–51)
AMPHETAMINE SCREEN, URINE: NEGATIVE
ANION GAP SERPL CALCULATED.3IONS-SCNC: 10 MMOL/L (ref 7–19)
AST SERPL-CCNC: 15 U/L (ref 5–32)
BARBITURATE SCREEN URINE: NEGATIVE
BENZODIAZEPINE SCREEN, URINE: POSITIVE
BILIRUB SERPL-MCNC: <0.2 MG/DL (ref 0.2–1.2)
BILIRUBIN URINE: NEGATIVE
BLOOD, URINE: NEGATIVE
BUN BLDV-MCNC: 20 MG/DL (ref 6–20)
CALCIUM SERPL-MCNC: 8.6 MG/DL (ref 8.6–10)
CANNABINOID SCREEN URINE: NEGATIVE
CHLORIDE BLD-SCNC: 101 MMOL/L (ref 98–111)
CLARITY: CLEAR
CO2: 31 MMOL/L (ref 22–29)
COCAINE METABOLITE SCREEN URINE: NEGATIVE
COLOR: YELLOW
CREAT SERPL-MCNC: 0.8 MG/DL (ref 0.5–0.9)
GFR NON-AFRICAN AMERICAN: >60
GLUCOSE BLD-MCNC: 205 MG/DL (ref 74–109)
GLUCOSE URINE: NEGATIVE MG/DL
HCG(URINE) PREGNANCY TEST: NEGATIVE
HCT VFR BLD CALC: 38.1 % (ref 37–47)
HEMOGLOBIN: 12.7 G/DL (ref 12–16)
KETONES, URINE: NEGATIVE MG/DL
LACTIC ACID: 0.8 MMOL/L (ref 0.5–1.9)
LEUKOCYTE ESTERASE, URINE: NEGATIVE
Lab: ABNORMAL
MCH RBC QN AUTO: 29.3 PG (ref 27–31)
MCHC RBC AUTO-ENTMCNC: 33.3 G/DL (ref 33–37)
MCV RBC AUTO: 88 FL (ref 81–99)
NITRITE, URINE: NEGATIVE
OPIATE SCREEN URINE: NEGATIVE
PDW BLD-RTO: 12.9 % (ref 11.5–14.5)
PERFORMED ON: NORMAL
PH UA: 6.5
PLATELET # BLD: 248 K/UL (ref 130–400)
PMV BLD AUTO: 9.6 FL (ref 9.4–12.3)
POC TROPONIN I: 0 NG/ML (ref 0–0.08)
POTASSIUM SERPL-SCNC: 4.1 MMOL/L (ref 3.5–5)
PROTEIN UA: NEGATIVE MG/DL
RBC # BLD: 4.33 M/UL (ref 4.2–5.4)
SALICYLATE, SERUM: <3 MG/DL (ref 3–10)
SODIUM BLD-SCNC: 142 MMOL/L (ref 136–145)
SPECIFIC GRAVITY UA: 1.03
TOTAL PROTEIN: 7 G/DL (ref 6.6–8.7)
UROBILINOGEN, URINE: 1 E.U./DL
WBC # BLD: 7.4 K/UL (ref 4.8–10.8)

## 2017-11-05 PROCEDURE — 81003 URINALYSIS AUTO W/O SCOPE: CPT

## 2017-11-05 PROCEDURE — G0480 DRUG TEST DEF 1-7 CLASSES: HCPCS

## 2017-11-05 PROCEDURE — 80307 DRUG TEST PRSMV CHEM ANLYZR: CPT

## 2017-11-05 PROCEDURE — 84484 ASSAY OF TROPONIN QUANT: CPT

## 2017-11-05 PROCEDURE — 85027 COMPLETE CBC AUTOMATED: CPT

## 2017-11-05 PROCEDURE — 82140 ASSAY OF AMMONIA: CPT

## 2017-11-05 PROCEDURE — 99282 EMERGENCY DEPT VISIT SF MDM: CPT | Performed by: NURSE PRACTITIONER

## 2017-11-05 PROCEDURE — 80053 COMPREHEN METABOLIC PANEL: CPT

## 2017-11-05 PROCEDURE — 81025 URINE PREGNANCY TEST: CPT

## 2017-11-05 PROCEDURE — 36415 COLL VENOUS BLD VENIPUNCTURE: CPT

## 2017-11-05 PROCEDURE — 83605 ASSAY OF LACTIC ACID: CPT

## 2017-11-05 NOTE — ED PROVIDER NOTES
[facility]  eMERGENCY dEPARTMENT eNCOUnter      Pt Name: Martin Smalls  MRN: [id number]  [place] 1975  Date of evaluation: 11/4/2017  Provider: MIKE Wright    CHIEF COMPLAINT       Chief Complaint   Patient presents with    Other     EMS states that pt says she took her night time medication and that it has made her more drowsy than usual.          HISTORY OF PRESENT ILLNESS   (Location/Symptom, Timing/Onset, Context/Setting, Quality, Duration, Modifying Factors, Severity)  Note limiting factors. Martin Smalls is a 38 y.o. female who presents to the emergency department for evaluation of changed mental status. Pt presents via EMS who related pt was found asleep on a toilet at facility. Pt lives at [facility] with history of TBI. Pt is sleepy and attends to interview intermittently. She denies fall as well as recent illness. She denies fever as well as chest pain. She has had no unusual headaches or lateralizing neurologic symptoms. She has had no fever or shortness of breath. She is present with staff from facility who tell me pt was found to have some type of pills in her purse that they relate do not belong to her. She denies taking any medications to me. She has no history of seizure disorder. She is diabetic with borderline elevated bs en route per ems. HPI    Nursing Notes were reviewed. REVIEW OF SYSTEMS    (2-9 systems for level 4, 10 or more for level 5)     Review of Systems   Unable to perform ROS: Mental status change       A complete review of systems was performed and is negative except as noted above in the HPI.        PAST MEDICAL HISTORY     Past Medical History:   Diagnosis Date    Anxiety     Asthma     Chronic back pain     Chronic hip pain     Depression     Diabetes mellitus ([address]     Edema     Hypertension     RLS (restless legs syndrome)     Substance abuse     [facility]traumatic [facility]) [address] [address]          SURGICAL HISTORY       Past Surgical History: Procedure Laterality Date    BACK SURGERY      LEG SURGERY Left     TUBAL LIGATION           CURRENT MEDICATIONS       Discharge Medication List as of 11/5/2017  2:40 AM      CONTINUE these medications which have NOT CHANGED    Details   albuterol sulfate  (90 BASE) MCG/ACT inhaler Inhale 2 puffs into the lungs every 4 hours as needed for Wheezing      amitriptyline (ELAVIL) 50 MG tablet Take 50 mg by mouth nightly      budesonide-formoterol (SYMBICORT) 80-4.5 MCG/ACT AERO Inhale 2 puffs into the lungs 2 times daily      esomeprazole Magnesium (NEXIUM) 40 MG PACK Take 40 mg by mouth daily      fluticasone (FLONASE) 50 MCG/ACT nasal spray 2 sprays by Nasal route daily      furosemide (LASIX) 40 MG tablet Take 40 mg by mouth daily      ibuprofen (ADVIL;MOTRIN) 800 MG tablet Take 800 mg by mouth every 8 hours as needed for Pain      lidocaine (LIDODERM) Place 1 patch onto the skin nightly      loratadine (CLARITIN) 10 MG tablet Take 10 mg by mouth daily      medroxyPROGESTERone (DEPO-PROVERA) 150 MG/ML injection Inject 150 mg into the muscle every 3 months      metFORMIN (GLUCOPHAGE) 500 MG tablet Take 500 mg by mouth 2 times daily (with meals)      metoprolol (LOPRESSOR) 100 MG tablet Take 100 mg by mouth 2 times daily      Multiple Vitamin (THERA PO) Take 1 tablet by mouth daily      nateglinide (STARLIX) 60 MG tablet Take 60 mg by mouth 3 times daily (before meals)      oxyCODONE-acetaminophen (PERCOCET)  MG per tablet Take 1 tablet by mouth 3 times daily as needed for Pain . PARoxetine (PAXIL) 30 MG tablet Take 30 mg by mouth every morning      pregabalin (LYRICA) 100 MG capsule Take 100 mg by mouth 3 times daily      propranolol (INDERAL) 20 MG tablet Take 20 mg by mouth every 6 hours as needed      tiZANidine (ZANAFLEX) 4 MG tablet Take 4 mg by mouth 3 times daily             ALLERGIES     Pcn [penicillins] and Tramadol    FAMILY HISTORY     History reviewed. No pertinent family history. SOCIAL HISTORY       Social History     Social History    Marital status: Single     Spouse name: N/A    Number of children: N/A    Years of education: N/A     Social History Main Topics    Smoking status: Current Every Day Smoker     Packs/day: 1.00     Types: Cigarettes    Smokeless tobacco: None    Alcohol use No    Drug use: No    Sexual activity: Not Asked     Other Topics Concern    None     Social History Narrative    None       SCREENINGS             PHYSICAL EXAM    (up to 7 for level 4, 8 or more for level 5)     ED Triage Vitals [11/04/17 2321]   BP Temp Temp src Pulse Resp SpO2 Height Weight   112/68 -- -- 83 16 95 % 5' 9\" (1.753 m) 220 lb (99.8 kg)       Physical Exam   Constitutional: She is oriented to person, place, and time. She appears well-nourished. Pt is alert oriented follows command intermittently attends to conversation and appears sleepy but arousable to voice   HENT:   Head: Normocephalic. Right Ear: External ear normal.   Left Ear: External ear normal.   Mouth/Throat: Oropharynx is clear and moist.   Neck:   No nuchal rigidity   Cardiovascular: Normal rate. Pulmonary/Chest: Effort normal.   Abdominal: Soft. Musculoskeletal: Normal range of motion. Neurological: She is alert and oriented to person, place, and time. No cranial nerve deficit. 4/5 MS equal bilaterally upper/lower extremities   Skin: Skin is warm and dry. Vitals reviewed. DIAGNOSTIC RESULTS     EKG: All EKG's are interpreted by the Emergency Department Physician who either signs or Co-signs this chart in the absence of a cardiologist.    There is a regular rate and rhythm. SR Normal AL interval and normal P waves. Normal QRS interval. Normal QT interval. No obvious ST elevation or ST depression.        RADIOLOGY:   Non-plain film images such as CT, Ultrasound and MRI are read by the radiologist. Plain radiographic images are visualized and preliminarily interpreted by the emergency physician with the below findings:        Interpretation per the Radiologist below, if available at the time of this note:    CT Head WO Contrast    (Results Pending)   XR Chest Portable    (Results Pending)         ED BEDSIDE ULTRASOUND:   Performed by ED Physician - none    LABS:  Labs Reviewed   COMPREHENSIVE METABOLIC PANEL - Abnormal; Notable for the following:        Result Value    CO2 31 (*)     Glucose 205 (*)     All other components within normal limits   CBC   URINALYSIS       All other labs were within normal range or not returned as of this dictation. RE-ASSESSMENT     0200: Pt up walking around ER drinking out of a can with complete resolution of symptoms. She is requesting discharge back to facility. EMERGENCY DEPARTMENT COURSE and DIFFERENTIAL DIAGNOSIS/MDM:   Vitals:    Vitals:    11/04/17 2321 11/05/17 0217 11/05/17 0249   BP: 112/68 129/75 122/78   Pulse: 83 83 78   Resp: 16 16 20   Temp:  98.6 °F (37 °C) 98 °F (36.7 °C)   TempSrc:  Oral    SpO2: 95% 96% 99%   Weight: 220 lb (99.8 kg)     Height: 5' 9\" (1.753 m)         MDM      CONSULTS:  None    PROCEDURES:  Unless otherwise noted below, none     Procedures    FINAL IMPRESSION      1.  Altered mental status, unspecified altered mental status type          DISPOSITION/PLAN   DISPOSITION     PATIENT REFERRED TO:  Joanne Hernandez MD  [place] [address]    Schedule an appointment as soon as possible for a visit         DISCHARGE MEDICATIONS:       Discharge Medication List as of 11/5/2017  2:40 AM          (Please note that portions of this note were completed with a voice recognition program.  Efforts were made to edit the dictations but occasionally words are mis-transcribed.)              Alexys Grayson, APRN  11/05/17 4968

## 2017-11-05 NOTE — ED NOTES
Bed: 04  Expected date:   Expected time:   Means of arrival:   Comments:  SHABBIR Oreilly RN  11/04/17 4365

## 2017-11-06 LAB
EKG P AXIS: 59 DEGREES
EKG P-R INTERVAL: 144 MS
EKG Q-T INTERVAL: 350 MS
EKG QRS DURATION: 84 MS
EKG QTC CALCULATION (BAZETT): 386 MS
EKG T AXIS: 42 DEGREES

## 2017-11-30 ENCOUNTER — HOSPITAL ENCOUNTER (EMERGENCY)
Facility: HOSPITAL | Age: 42
Discharge: HOME | End: 2017-12-01
Attending: EMERGENCY MEDICINE | Admitting: EMERGENCY MEDICINE

## 2017-11-30 DIAGNOSIS — Z79.899 POLYPHARMACY: ICD-10-CM

## 2017-11-30 DIAGNOSIS — R73.9 HYPERGLYCEMIA: ICD-10-CM

## 2017-11-30 DIAGNOSIS — F11.90 CHRONIC NARCOTIC USE: Primary | ICD-10-CM

## 2017-11-30 LAB — GLUCOSE BLDC GLUCOMTR-MCNC: 246 MG/DL (ref 70–130)

## 2017-11-30 PROCEDURE — 82962 GLUCOSE BLOOD TEST: CPT

## 2017-11-30 PROCEDURE — 99283 EMERGENCY DEPT VISIT LOW MDM: CPT

## 2017-12-01 VITALS
RESPIRATION RATE: 16 BRPM | DIASTOLIC BLOOD PRESSURE: 55 MMHG | SYSTOLIC BLOOD PRESSURE: 95 MMHG | OXYGEN SATURATION: 100 % | TEMPERATURE: 97.7 F | HEIGHT: 69 IN | HEART RATE: 79 BPM | WEIGHT: 220 LBS | BODY MASS INDEX: 32.58 KG/M2

## 2017-12-01 PROCEDURE — 93005 ELECTROCARDIOGRAM TRACING: CPT | Performed by: EMERGENCY MEDICINE

## 2017-12-01 PROCEDURE — 93010 ELECTROCARDIOGRAM REPORT: CPT | Performed by: INTERNAL MEDICINE

## 2017-12-01 NOTE — ED PROVIDER NOTES
Subjective   HPI Comments: Patient felt like her blood sugar was dropping they checked her blood sugar was not low her blood pressure was low and they were down to the ER in the ER the patient is sleepy and the influence of narcotic analgesia difficult to arouse she finally woke up and went back to sleep I think medication is the reason behind her noted low blood pressure and her drowsiness    Patient is a 42 y.o. female presenting with general illness.   Illness   Location:  Fatigue  Severity:  Moderate  Onset quality:  Gradual  Timing:  Intermittent  Chronicity:  Chronic  Associated symptoms: sore throat    Associated symptoms: no abdominal pain, no chest pain, no congestion, no cough, no diarrhea, no ear pain, no headaches, no loss of consciousness, no myalgias, no nausea, no rash, no shortness of breath and no vomiting        Review of Systems   Constitutional: Negative.    HENT: Positive for sore throat. Negative for congestion and ear pain.    Eyes: Negative.    Respiratory: Negative.  Negative for cough and shortness of breath.    Cardiovascular: Negative.  Negative for chest pain.   Gastrointestinal: Negative.  Negative for abdominal pain, diarrhea, nausea and vomiting.   Musculoskeletal: Negative.  Negative for back pain, myalgias and neck pain.   Skin: Negative.  Negative for rash.   Neurological: Negative.  Negative for loss of consciousness and headaches.   All other systems reviewed and are negative.      Past Medical History:   Diagnosis Date   • Anxiety    • Asthma    • Chronic back pain    • Chronic hip pain    • Depression    • Diabetes mellitus    • Edema of both legs    • Hypertension    • Restless leg syndrome    • Substance abuse in remission    • Traumatic brain injury        Allergies   Allergen Reactions   • Penicillins    • Tramadol        Past Surgical History:   Procedure Laterality Date   • BACK SURGERY      L5-L6 surgery   •  SECTION     • LEG SURGERY      Left leg jose ramon placement    • PEG TUBE INSERTION     • PEG TUBE REMOVAL     • TRACHEOSTOMY      with reversal   • TUBAL ABDOMINAL LIGATION         Family History   Problem Relation Age of Onset   • Heart murmur Mother    • Heart disease Father    • No Known Problems Brother    • No Known Problems Sister    • No Known Problems Brother        Social History     Social History   • Marital status: Single     Spouse name: N/A   • Number of children: N/A   • Years of education: N/A     Social History Main Topics   • Smoking status: Current Every Day Smoker     Packs/day: 0.25     Types: Cigarettes   • Smokeless tobacco: Never Used   • Alcohol use No   • Drug use: Yes      Comment: history of, narcotics   • Sexual activity: No      Comment: last act of intercourse a year ago     Other Topics Concern   • None     Social History Narrative           Objective   Physical Exam   Constitutional: She is oriented to person, place, and time. She appears well-developed and well-nourished.   HENT:   Head: Normocephalic and atraumatic.   Eyes: Conjunctivae and EOM are normal. Pupils are equal, round, and reactive to light.   Neck: Normal range of motion. Neck supple.   Cardiovascular: Normal rate, regular rhythm, normal heart sounds and intact distal pulses.    Pulmonary/Chest: Effort normal and breath sounds normal.   Abdominal: Soft. Bowel sounds are normal.   Musculoskeletal: Normal range of motion.   Neurological: She is oriented to person, place, and time. She has normal reflexes.   Sleepy but wakes up follow s commands    Skin: Skin is warm and dry.   Psychiatric: She has a normal mood and affect.   Nursing note and vitals reviewed.      Procedures         ED Course  ED Course   Comment By Time   She needs to decrease her oxycontin intake Matt Venegas MD 12/01 0100                  [facility]    Final diagnoses:   Chronic narcotic use   Hyperglycemia   Polypharmacy            Matt Venegas MD  12/01/17 0103

## 2018-01-22 ENCOUNTER — TRANSCRIBE ORDERS (OUTPATIENT)
Dept: ADMINISTRATIVE | Facility: HOSPITAL | Age: 43
End: 2018-01-22

## 2018-02-12 ENCOUNTER — HOSPITAL ENCOUNTER (OUTPATIENT)
Dept: DIABETES SERVICES | Facility: HOSPITAL | Age: 43
Discharge: HOME | End: 2018-02-12

## 2018-03-14 ENCOUNTER — OFFICE VISIT (OUTPATIENT)
Dept: PODIATRY | Facility: CLINIC | Age: 43
End: 2018-03-14

## 2018-03-14 VITALS
DIASTOLIC BLOOD PRESSURE: 76 MMHG | HEART RATE: 85 BPM | OXYGEN SATURATION: 97 % | WEIGHT: 221 LBS | HEIGHT: 69 IN | BODY MASS INDEX: 32.73 KG/M2 | SYSTOLIC BLOOD PRESSURE: 114 MMHG

## 2018-03-14 DIAGNOSIS — E11.49 DIABETES MELLITUS TYPE 2 WITH NEUROLOGICAL MANIFESTATIONS (HCC): ICD-10-CM

## 2018-03-14 DIAGNOSIS — S06.9X9D TRAUMATIC BRAIN INJURY WITH LOSS OF CONSCIOUSNESS, SUBSEQUENT ENCOUNTER: ICD-10-CM

## 2018-03-14 DIAGNOSIS — L60.0 INGROWN TOENAIL: Primary | ICD-10-CM

## 2018-03-14 DIAGNOSIS — L84 FOOT CALLUS: ICD-10-CM

## 2018-03-14 PROBLEM — S06.9X9A TRAUMATIC BRAIN INJURY WITH LOSS OF CONSCIOUSNESS: Status: ACTIVE | Noted: 2018-03-14

## 2018-03-14 PROCEDURE — 11055 PARING/CUTG B9 HYPRKER LES 1: CPT | Performed by: PODIATRIST

## 2018-03-14 PROCEDURE — 11720 DEBRIDE NAIL 1-5: CPT | Performed by: PODIATRIST

## 2018-03-14 PROCEDURE — 99213 OFFICE O/P EST LOW 20 MIN: CPT | Performed by: PODIATRIST

## 2018-03-14 RX ORDER — IBUPROFEN 800 MG/1
800 TABLET ORAL 3 TIMES DAILY
COMMUNITY
End: 2018-11-13 | Stop reason: SDUPTHER

## 2018-03-14 RX ORDER — OXYCODONE AND ACETAMINOPHEN 10; 325 MG/1; MG/1
1 TABLET ORAL 3 TIMES DAILY
COMMUNITY
End: 2018-11-13 | Stop reason: SDUPTHER

## 2018-03-14 RX ORDER — BLOOD-GLUCOSE METER
KIT MISCELLANEOUS
COMMUNITY

## 2018-03-14 RX ORDER — IBUPROFEN 200 MG
TABLET ORAL AS NEEDED
COMMUNITY
End: 2020-11-09

## 2018-03-14 RX ORDER — SYRINGE-NEEDLE,INSULIN,0.5 ML 27GX1/2"
SYRINGE, EMPTY DISPOSABLE MISCELLANEOUS
COMMUNITY

## 2018-03-14 NOTE — PROGRESS NOTES
[facility] - PODIATRY    Today's Date: 18    Patient Name: Zahida Geller  MRN: [id number]  CSN: [id number]  PCP: Judith Mauro MD  Referring Provider: No ref. provider found    SUBJECTIVE     Chief Complaint   Patient presents with   • Right Foot - Follow-up, Pain, Ingrown Toenail, Numbness, Wound Check   • Diabetes     PCP: Dr. Mauro Blood sugar this AM was 157     HPI: Zahida Geller, a 42 y.o.female, comes to clinic as a(n) established patient presenting for diabetic foot exam, complaining of painful toenails and callus. Patient has h/o anxiety, asthma, back pain, depression, DM2, edema, HTN, TBI, previous substance abuse. Patient states that her wound has remained healed but that one of the calluses as come back. Denies redness or drainage. Notes that her toenails are thick, long and painful. Due to neuropathy, she is uncomfortable cutting her own nails. Patient is NIDDM with last stated BG level of 157mg/dl. Admits pain at 4/10 level and described as aching and sharp. Denies any constitutional symptoms. No other pedal complaints at this time.    Past Medical History:   Diagnosis Date   • Anxiety    • Asthma    • Chronic back pain    • Chronic hip pain    • Depression    • Diabetes mellitus    • Edema of both legs    • Hypertension    • Restless leg syndrome    • Substance abuse in remission    • Traumatic brain injury      Past Surgical History:   Procedure Laterality Date   • BACK SURGERY      L5-L6 surgery   •  SECTION     • LEG SURGERY      Left leg jose ramon placement   • PEG TUBE INSERTION     • PEG TUBE REMOVAL     • TRACHEOSTOMY      with reversal   • TUBAL ABDOMINAL LIGATION       Family History   Problem Relation Age of Onset   • Heart murmur Mother    • Heart disease Father    • No Known Problems Brother    • No Known Problems Sister    • No Known Problems Brother      Social History     Social History   • Marital status: Single     Spouse name: N/A   • Number of  "children: N/A   • Years of education: N/A     Occupational History   • Not on file.     Social History Main Topics   • Smoking status: Current Every Day Smoker     Packs/day: 0.25     Types: Cigarettes, Electronic Cigarette   • Smokeless tobacco: Never Used   • Alcohol use No   • Drug use:       Comment: history of, narcotics   • Sexual activity: No      Comment: last act of intercourse a year ago     Other Topics Concern   • Not on file     Social History Narrative   • No narrative on file     Allergies   Allergen Reactions   • Penicillins    • Tramadol      Current Outpatient Prescriptions   Medication Sig Dispense Refill   • albuterol (PROVENTIL HFA;VENTOLIN HFA) 108 (90 Base) MCG/ACT inhaler Inhale.     • amitriptyline (ELAVIL) 50 MG tablet Take  by mouth.     • Blood Glucose Monitoring Suppl (FREESTYLE FREEDOM LITE) w/Device kit      • budesonide-formoterol (SYMBICORT) 80-4.5 MCG/ACT inhaler Inhale.     • esomeprazole (NexIUM) 40 MG packet Take  by mouth.     • fluticasone (FLONASE) 50 MCG/ACT nasal spray into each nostril.     • furosemide (LASIX) 40 MG tablet Take  by mouth.     • guaiFENesin (ROBITUSSIN) 100 MG/5ML syrup Take 200 mg by mouth As Needed for Cough.     • ibuprofen (ADVIL,MOTRIN) 800 MG tablet Take 800 mg by mouth 3 (Three) Times a Day. Take 1 tablet by mouth three times daily for pain.     • Insulin Syringe-Needle U-100 30G X 5/16\" 0.5 ML misc      • lidocaine (LIDODERM) 5 % Place 1 patch on the skin Daily As Needed for Moderate Pain (4-6). Remove & Discard patch within 12 hours or as directed by MD     • loratadine (CLARITIN) 10 MG tablet Take  by mouth.     • medroxyPROGESTERone (DEPO-PROVERA) 150 MG/ML injection Inject  into the shoulder, thigh, or buttocks.     • metFORMIN (GLUCOPHAGE) 500 MG tablet Take  by mouth.     • metoprolol tartrate (LOPRESSOR) 100 MG tablet Take  by mouth.     • Multiple Vitamin (THERA PO) Take 1 tablet by mouth Daily.     • Multiple Vitamins-Minerals (THERA-M " MULTIPLE VITAMINS PO) Take  by mouth.     • nateglinide (STARLIX) 60 MG tablet Take  by mouth.     • neomycin-bacitracin-polymyxin (NEOSPORIN) 5-400-5000 ointment Apply  topically As Needed.     • oxyCODONE-acetaminophen (PERCOCET)  MG per tablet Take 1 tablet by mouth 3 (Three) Times a Day.     • PARoxetine (PAXIL) 30 MG tablet Take  by mouth.     • phenol (SORE THROAT SPRAY) 1.4 % liquid liquid Apply  to the mouth or throat As Needed.     • pregabalin (LYRICA) 100 MG capsule Take  by mouth.     • propranolol (INDERAL) 20 MG tablet Take  by mouth.     • tiZANidine (ZANAFLEX) 4 MG tablet Take  by mouth.       No current facility-administered medications for this visit.      Review of Systems   Constitutional: Negative for chills and fever.   HENT: Negative for congestion.    Respiratory: Negative for shortness of breath.    Cardiovascular: Positive for leg swelling. Negative for chest pain.   Gastrointestinal: Negative for constipation, diarrhea, nausea and vomiting.   Skin: Positive for wound.   Neurological: Positive for numbness.       OBJECTIVE     Vitals:    03/14/18 0930   BP: 114/76   Pulse: 85   SpO2: 97%       PHYSICAL EXAM  GEN:   A&Ox3, NAD. Pt presents to clinic ambulating without assistance and wearing Casual Shoes. Accompanied by friend.     NEURO:   Protective sensation intact to 9/10 sites Right foot, 9/10 site Left foot using [place] monofilament  Light touch sensation diminished  No Tinel's or [place] sign.    VASC:  Skin temperature Warm to Warm proximal to distal gricel  DP pulses 2/4 Right, 2/4 Left  PT pulses 2/4 Right, 2/4 Left  CFT <3 sec gricel  Pedal hair growth absent  trace edema noted gricel  Varicosities absent gricel    MUSC/SKEL:  Muscle Strength Right foot Dorsiflexors 5/5, Plantarflexors 5/5, Evertors 5/5, Invertors 5/5  Muscle Strength Left foot Dorsiflexors 5/5, Plantarflexors 5/5, Evertors 5/5, Invertors 5/5  ROM of the 1st MTP is full without pain or crepitus  ROM of the MTJ  is full without pain or crepitus    ROM of the STJ is full without pain or crepitus    ROM of the ankle joint is full without pain or crepitus    Minimal to no POP of right hallux  Rectus foot type   No gross pedal musculoskeletal deformities noted.     DERM:  Pedal nails x10 are within normal limits of length and thickness  Webspaces are Clean, Dry, and Intact  Skin is normal in turgor and texture   Wound noted to dorsal aspect of right 1st MTPJ. At this visit, fully epithelialized. Minimal erythema. No purulence, malodor, or probing.       RADIOLOGY/NUCLEAR:  No results found.    LABORATORY/CULTURE RESULTS:      PATHOLOGY RESULTS:       ASSESSMENT/PLAN     Zahida was seen today for diabetes, follow-up, pain, ingrown toenail, numbness and wound check.    Diagnoses and all orders for this visit:    Ingrown toenail    Foot callus    Diabetes mellitus type 2 with neurological manifestations    Traumatic brain injury with loss of consciousness, subsequent encounter      Comprehensive lower extremity examination and evaluation was performed.  Discussed findings and treatment plan including risks, benefits, and treatment options with patient in detail. Patient agreed with treatment plan.  After verbal consent obtained, nail(s) x1 debrided of offending borders with nail nipper without incidence  After verbal consent obtained, calluses x1 pared utilizing dermal curette and/or scalpel without incidence  Patient may maintain nails and calluses at home utilizing emery board or pumice stone between visits as needed  Reviewed at home diabetic foot care including daily foot checks   Continue BG control.  An After Visit Summary was printed and given to the patient at discharge, including (if requested) any available informative/educational handouts regarding diagnosis, treatment, or medications. All questions were answered to patient/family satisfaction. Should symptoms fail to improve or worsen they agree to call or return to  clinic or to go to the Emergency Department. Discussed the importance of following up with any needed screening tests/labs/specialist appointments and any requested follow-up recommended by me today. Importance of maintaining follow-up discussed and patient accepts that missed appointments can delay diagnosis and potentially lead to worsening of conditions.  Return if symptoms worsen or fail to improve., or sooner if acute issues arise.        This document has been electronically signed by Christopher Dennis DPM on March 14, 2018 10:01 AM

## 2018-06-12 ENCOUNTER — APPOINTMENT (OUTPATIENT)
Dept: CT IMAGING | Age: 43
End: 2018-06-12
Payer: MEDICAID

## 2018-06-12 ENCOUNTER — HOSPITAL ENCOUNTER (EMERGENCY)
Age: 43
Discharge: HOME | End: 2018-06-12
Payer: MEDICAID

## 2018-06-12 VITALS
RESPIRATION RATE: 20 BRPM | HEIGHT: 69 IN | DIASTOLIC BLOOD PRESSURE: 76 MMHG | BODY MASS INDEX: 32.58 KG/M2 | WEIGHT: 220 LBS | TEMPERATURE: 98 F | OXYGEN SATURATION: 99 % | SYSTOLIC BLOOD PRESSURE: 122 MMHG | HEART RATE: 76 BPM

## 2018-06-12 DIAGNOSIS — S09.90XA CLOSED HEAD INJURY, INITIAL ENCOUNTER: Primary | ICD-10-CM
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
## 2018-06-12 DIAGNOSIS — M54.50 ACUTE RIGHT-SIDED LOW BACK PAIN WITHOUT SCIATICA: ICD-10-CM

## 2018-06-12 PROCEDURE — 99283 EMERGENCY DEPT VISIT LOW MDM: CPT

## 2018-06-12 PROCEDURE — 72131 CT LUMBAR SPINE W/O DYE: CPT

## 2018-06-12 PROCEDURE — 99284 EMERGENCY DEPT VISIT MOD MDM: CPT | Performed by: NURSE PRACTITIONER

## 2018-06-12 PROCEDURE — 70450 CT HEAD/BRAIN W/O DYE: CPT

## 2018-06-12 ASSESSMENT — ENCOUNTER SYMPTOMS
VOMITING: 0
BACK PAIN: 1
COUGH: 0
WHEEZING: 0
CHEST TIGHTNESS: 0
ABDOMINAL PAIN: 0
DIARRHEA: 0
CONSTIPATION: 0
SHORTNESS OF BREATH: 0
COLOR CHANGE: 0
NAUSEA: 0

## 2018-06-14 ENCOUNTER — TELEPHONE (OUTPATIENT)
Dept: PODIATRY | Facility: CLINIC | Age: 43
End: 2018-06-14

## 2018-06-14 NOTE — TELEPHONE ENCOUNTER
[facility] called to confirm aZhida Geller's next appointment with Dr. Miles Dennis on July 30, 2018.

## 2018-07-26 ENCOUNTER — TELEPHONE (OUTPATIENT)
Dept: PODIATRY | Facility: CLINIC | Age: 43
End: 2018-07-26

## 2018-07-26 NOTE — TELEPHONE ENCOUNTER
Called and reminded [facility] of upcoming appointment with Dr. Miles Dennis on 07/30/2018 at 11:00 am. Verbal confirmation of appointment given at this time.

## 2018-07-26 NOTE — PROGRESS NOTES
[facility] - PODIATRY    Today's Date: 18    Patient Name: Zahida Geller  MRN: [id number]  CSN: [id number]  PCP: Judith Mauro MD  Referring Provider: No ref. provider found    SUBJECTIVE     Chief Complaint   Patient presents with   • Left Foot - Follow-up     PT is here for diabetic foot and nail care. PT denies pain at present time.   • Right Foot - Follow-up   • Diabetes     Blood sugar 120mg/dl. PCP: Dr. Judith Mauro      HPI: Zahida Geller, a 42 y.o.female, comes to clinic as a(n) established patient presenting for diabetic foot exam, complaining of painful toenails and callus. Patient has h/o anxiety, asthma, back pain, depression, DM2, edema, HTN, TBI, previous substance abuse. Patient states that one of the calluses as come back and can be painful from time to time. Denies redness or drainage. Notes that her toenails are thick, long and painful. Feels that her left hallux nail is slightly ingrown. Due to neuropathy, she is uncomfortable cutting her own nails. Patient is NIDDM with last stated BG level of 120mg/dl. Denies pain today. Denies any constitutional symptoms. No other pedal complaints at this time.    Past Medical History:   Diagnosis Date   • Anxiety    • Asthma    • Chronic back pain    • Chronic hip pain    • Depression    • Diabetes mellitus (CMS/HCC)    • Edema of both legs    • Hypertension    • Restless leg syndrome    • Substance abuse in remission    • Traumatic brain injury (CMS/HCC)      Past Surgical History:   Procedure Laterality Date   • BACK SURGERY      L5-L6 surgery   •  SECTION     • LEG SURGERY      Left leg jose ramon placement   • PEG TUBE INSERTION     • PEG TUBE REMOVAL     • TRACHEOSTOMY      with reversal   • TUBAL ABDOMINAL LIGATION       Family History   Problem Relation Age of Onset   • Heart murmur Mother    • Heart disease Father    • No Known Problems Brother    • No Known Problems Sister    • No Known Problems Brother      Social  "History     Social History   • Marital status: Single     Spouse name: N/A   • Number of children: N/A   • Years of education: N/A     Occupational History   • Not on file.     Social History Main Topics   • Smoking status: Current Every Day Smoker     Packs/day: 0.25     Types: Cigarettes, Electronic Cigarette   • Smokeless tobacco: Never Used   • Alcohol use No   • Drug use: Yes      Comment: history of, narcotics   • Sexual activity: No      Comment: last act of intercourse a year ago     Other Topics Concern   • Not on file     Social History Narrative   • No narrative on file     Allergies   Allergen Reactions   • Penicillins    • Tramadol      Current Outpatient Prescriptions   Medication Sig Dispense Refill   • albuterol (PROVENTIL HFA;VENTOLIN HFA) 108 (90 Base) MCG/ACT inhaler Inhale.     • amitriptyline (ELAVIL) 50 MG tablet Take  by mouth.     • Blood Glucose Monitoring Suppl (FREESTYLE FREEDOM LITE) w/Device kit      • budesonide-formoterol (SYMBICORT) 80-4.5 MCG/ACT inhaler Inhale.     • fluticasone (FLONASE) 50 MCG/ACT nasal spray into each nostril.     • furosemide (LASIX) 40 MG tablet Take  by mouth.     • guaiFENesin (ROBITUSSIN) 100 MG/5ML syrup Take 200 mg by mouth As Needed for Cough.     • ibuprofen (ADVIL,MOTRIN) 800 MG tablet Take 800 mg by mouth 3 (Three) Times a Day. Take 1 tablet by mouth three times daily for pain.     • Insulin Syringe-Needle U-100 30G X 5/16\" 0.5 ML misc      • lidocaine (LIDODERM) 5 % Place 1 patch on the skin Daily As Needed for Moderate Pain (4-6). Remove & Discard patch within 12 hours or as directed by MD     • loratadine (CLARITIN) 10 MG tablet Take  by mouth.     • medroxyPROGESTERone (DEPO-PROVERA) 150 MG/ML injection Inject  into the shoulder, thigh, or buttocks.     • metFORMIN (GLUCOPHAGE) 500 MG tablet Take  by mouth.     • metoprolol tartrate (LOPRESSOR) 100 MG tablet Take  by mouth.     • Multiple Vitamin (THERA PO) Take 1 tablet by mouth Daily.     • " Multiple Vitamins-Minerals (THERA-M MULTIPLE VITAMINS PO) Take  by mouth.     • nateglinide (STARLIX) 60 MG tablet Take  by mouth.     • neomycin-bacitracin-polymyxin (NEOSPORIN) 5-400-5000 ointment Apply  topically As Needed.     • omeprazole (priLOSEC) 20 MG capsule Take 20 mg by mouth Daily.     • oxyCODONE-acetaminophen (PERCOCET)  MG per tablet Take 1 tablet by mouth 3 (Three) Times a Day.     • PARoxetine (PAXIL) 30 MG tablet Take  by mouth.     • phenol (SORE THROAT SPRAY) 1.4 % liquid liquid Apply  to the mouth or throat As Needed.     • pregabalin (LYRICA) 100 MG capsule Take  by mouth.     • propranolol (INDERAL) 20 MG tablet Take  by mouth.     • tiZANidine (ZANAFLEX) 4 MG tablet Take  by mouth.     • esomeprazole (NexIUM) 40 MG packet Take  by mouth.       No current facility-administered medications for this visit.      Review of Systems   Constitutional: Negative for chills and fever.   HENT: Negative for congestion.    Respiratory: Negative for shortness of breath.    Cardiovascular: Positive for leg swelling. Negative for chest pain.   Gastrointestinal: Negative for constipation, diarrhea, nausea and vomiting.   Musculoskeletal: Positive for gait problem.   Skin: Negative for wound.   Neurological: Positive for numbness.       OBJECTIVE     Vitals:    07/30/18 1112   BP: 116/74   Pulse: 85   SpO2: 94%       PHYSICAL EXAM  GEN:   A&Ox3, NAD. Pt presents to clinic ambulating with right AFO and wearing Casual Shoes. Accompanied by friend.     NEURO:   Protective sensation intact to 9/10 sites Right foot, 9/10 site Left foot using Selby-Luis monofilament  Light touch sensation diminished  No Tinel's or Villeux sign.    VASC:  Skin temperature Warm to Warm proximal to distal gricel  DP pulses 2/4 Right, 2/4 Left  PT pulses 2/4 Right, 2/4 Left  CFT <3 sec gricel  Pedal hair growth absent  trace edema noted gricel  Varicosities absent gricel    MUSC/SKEL:  Muscle Strength Right foot Dorsiflexors 3/5,  Plantarflexors 5/5, Evertors 5/5, Invertors 5/5  Muscle Strength Left foot Dorsiflexors 5/5, Plantarflexors 5/5, Evertors 5/5, Invertors 5/5  ROM of the 1st MTP is full without pain or crepitus  ROM of the MTJ is full without pain or crepitus    ROM of the STJ is full without pain or crepitus    ROM of the ankle joint is full without pain or crepitus    Mild POP of left hallux  Rectus foot type   No gross pedal musculoskeletal deformities noted.     DERM:  Pedal nails x2 of left foot are thickened, elongated and discolored with presence of subungual debris  Webspaces are Clean, Dry, and Intact  Skin is normal in turgor and texture   Hyperkeratotic tissue to plantar right HIPK with subdermal bleeding. Upon paring, no breaks in integument noted.      RADIOLOGY/NUCLEAR:  No results found.    LABORATORY/CULTURE RESULTS:      PATHOLOGY RESULTS:       ASSESSMENT/PLAN     Zahida was seen today for diabetes, follow-up and follow-up.    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  Discussed findings and treatment plan including risks, benefits, and treatment options with patient in detail. Patient agreed with treatment plan.  After verbal consent obtained, nail(s) x2 debrided of offending borders with nail nipper without incidence  After verbal consent obtained, calluses x1 pared utilizing dermal curette and/or scalpel without incidence  Patient may maintain nails and calluses at home utilizing emery board or pumice stone between visits as needed  Reviewed at home diabetic foot care including daily foot checks   Continue BG control.  Continue use of compression stockings and AFO  An After Visit Summary was printed and given to the patient at discharge,  including (if requested) any available informative/educational handouts regarding diagnosis, treatment, or medications. All questions were answered to patient/family satisfaction. Should symptoms fail to improve or worsen they agree to call or return to clinic or to go to the Emergency Department. Discussed the importance of following up with any needed screening tests/labs/specialist appointments and any requested follow-up recommended by me today. Importance of maintaining follow-up discussed and patient accepts that missed appointments can delay diagnosis and potentially lead to worsening of conditions.  Return in about 3 months (around 10/30/2018)., or sooner if acute issues arise.        This document has been electronically signed by Christopher Dennis DPM on July 30, 2018 11:42 AM

## 2018-07-30 ENCOUNTER — OFFICE VISIT (OUTPATIENT)
Dept: PODIATRY | Facility: CLINIC | Age: 43
End: 2018-07-30

## 2018-07-30 VITALS
WEIGHT: 216 LBS | HEIGHT: 69 IN | HEART RATE: 85 BPM | OXYGEN SATURATION: 94 % | SYSTOLIC BLOOD PRESSURE: 116 MMHG | DIASTOLIC BLOOD PRESSURE: 74 MMHG | BODY MASS INDEX: 31.99 KG/M2

## 2018-07-30 DIAGNOSIS — B35.1 ONYCHOMYCOSIS: ICD-10-CM

## 2018-07-30 DIAGNOSIS — E11.49 DIABETES MELLITUS TYPE 2 WITH NEUROLOGICAL MANIFESTATIONS (HCC): ICD-10-CM

## 2018-07-30 DIAGNOSIS — L60.0 INGROWN TOENAIL: Primary | ICD-10-CM

## 2018-07-30 DIAGNOSIS — L84 FOOT CALLUS: ICD-10-CM

## 2018-07-30 DIAGNOSIS — S06.9X9D TRAUMATIC BRAIN INJURY WITH LOSS OF CONSCIOUSNESS, SUBSEQUENT ENCOUNTER: ICD-10-CM

## 2018-07-30 PROCEDURE — 99212 OFFICE O/P EST SF 10 MIN: CPT | Performed by: PODIATRIST

## 2018-07-30 PROCEDURE — 11720 DEBRIDE NAIL 1-5: CPT | Performed by: PODIATRIST

## 2018-07-30 PROCEDURE — 11055 PARING/CUTG B9 HYPRKER LES 1: CPT | Performed by: PODIATRIST

## 2018-07-30 RX ORDER — OMEPRAZOLE 20 MG/1
20 CAPSULE, DELAYED RELEASE ORAL DAILY
COMMUNITY
End: 2019-02-27

## 2018-08-30 ENCOUNTER — APPOINTMENT (OUTPATIENT)
Dept: CT IMAGING | Age: 43
End: 2018-08-30
Payer: MEDICAID

## 2018-08-30 ENCOUNTER — HOSPITAL ENCOUNTER (EMERGENCY)
Age: 43
Discharge: HOME | End: 2018-08-30
Payer: MEDICAID

## 2018-08-30 VITALS
OXYGEN SATURATION: 92 % | DIASTOLIC BLOOD PRESSURE: 78 MMHG | RESPIRATION RATE: 18 BRPM | HEART RATE: 90 BPM | SYSTOLIC BLOOD PRESSURE: 95 MMHG | TEMPERATURE: 98.3 F

## 2018-08-30 DIAGNOSIS — S09.90XA CLOSED HEAD INJURY, INITIAL ENCOUNTER: Primary | ICD-10-CM

## 2018-08-30 PROCEDURE — 99284 EMERGENCY DEPT VISIT MOD MDM: CPT

## 2018-08-30 PROCEDURE — 99284 EMERGENCY DEPT VISIT MOD MDM: CPT | Performed by: NURSE PRACTITIONER

## 2018-08-30 PROCEDURE — 70450 CT HEAD/BRAIN W/O DYE: CPT

## 2018-08-30 RX ORDER — LANOLIN ALCOHOL/MO/W.PET/CERES
3 CREAM (GRAM) TOPICAL DAILY
COMMUNITY

## 2018-08-30 RX ORDER — FLUCONAZOLE 150 MG/1
150 TABLET ORAL ONCE
COMMUNITY
End: 2022-06-13 | Stop reason: ALTCHOICE

## 2018-08-30 RX ORDER — NITROFURANTOIN MACROCRYSTALS 100 MG/1
100 CAPSULE ORAL 4 TIMES DAILY
COMMUNITY
End: 2022-06-13 | Stop reason: ALTCHOICE

## 2018-08-30 ASSESSMENT — ENCOUNTER SYMPTOMS
ABDOMINAL PAIN: 0
SHORTNESS OF BREATH: 0
TROUBLE SWALLOWING: 0
SINUS PRESSURE: 0
VOMITING: 0
COUGH: 0
SORE THROAT: 0
DIARRHEA: 0
BACK PAIN: 1
NAUSEA: 0
RHINORRHEA: 0
CONSTIPATION: 0

## 2018-08-30 ASSESSMENT — PAIN DESCRIPTION - LOCATION: LOCATION: HEAD

## 2018-08-30 ASSESSMENT — PAIN DESCRIPTION - PAIN TYPE: TYPE: ACUTE PAIN

## 2018-08-30 ASSESSMENT — PAIN SCALES - GENERAL: PAINLEVEL_OUTOF10: 4
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
## 2018-08-31 NOTE — ED NOTES
ASSESSMENT:    Pt reports fall in which she hit her head. Hematoma above left eyebrow noted. PT ALERT/ORIENTED X4. PUPILS EQUAL/REACTIVE    SKIN:  WARM/DRY PINK CAPILLARY REFILL < 2SECS    CARDIAC:  S1 S2 NOTED     LUNGS: CLEAR UPPER AND LOWER LOBES, RESPIRATIONS EVEN/UNLABORED     ABDOMEN: BOWEL SOUNDS NOTED UPPER AND LOWER QUADRANTS                     SOFT AND NONTENDER. EXTREMITIES:  BILATERAL DP AND PT AND NO EDEMA NOTED. NO DISTRESS NOTED. SIDE RAILS UP AND CALL LIGHT IN REACH.      Chi Matthew RN  08/30/18 1987

## 2018-10-12 ENCOUNTER — TELEPHONE (OUTPATIENT)
Dept: PODIATRY | Facility: CLINIC | Age: 43
End: 2018-10-12

## 2018-10-12 NOTE — TELEPHONE ENCOUNTER
Received voicemail from Monik at [facility] stating that patient appointment had been changed without letting them know.  I reviewed patient chart and there was no reschedule and the appointment has always been for 11/12 at 11 am.  I explained this to Monik and she expressed understanding and thanked me for calling her back.

## 2018-10-23 ENCOUNTER — OFFICE VISIT (OUTPATIENT)
Dept: OBSTETRICS AND GYNECOLOGY | Facility: CLINIC | Age: 43
End: 2018-10-23

## 2018-10-23 VITALS
DIASTOLIC BLOOD PRESSURE: 74 MMHG | HEIGHT: 69 IN | WEIGHT: 215 LBS | BODY MASS INDEX: 31.84 KG/M2 | SYSTOLIC BLOOD PRESSURE: 108 MMHG

## 2018-10-23 DIAGNOSIS — Z01.419 ENCOUNTER FOR GYNECOLOGICAL EXAMINATION WITHOUT ABNORMAL FINDING: Primary | ICD-10-CM

## 2018-10-23 DIAGNOSIS — F17.200 SMOKER: ICD-10-CM

## 2018-10-23 DIAGNOSIS — Z12.31 ENCOUNTER FOR SCREENING MAMMOGRAM FOR MALIGNANT NEOPLASM OF BREAST: ICD-10-CM

## 2018-10-23 PROCEDURE — 87798 DETECT AGENT NOS DNA AMP: CPT | Performed by: NURSE PRACTITIONER

## 2018-10-23 PROCEDURE — 99396 PREV VISIT EST AGE 40-64: CPT | Performed by: NURSE PRACTITIONER

## 2018-10-23 PROCEDURE — 87491 CHLMYD TRACH DNA AMP PROBE: CPT | Performed by: NURSE PRACTITIONER

## 2018-10-23 PROCEDURE — 87661 TRICHOMONAS VAGINALIS AMPLIF: CPT | Performed by: NURSE PRACTITIONER

## 2018-10-23 PROCEDURE — 87591 N.GONORRHOEAE DNA AMP PROB: CPT | Performed by: NURSE PRACTITIONER

## 2018-10-23 PROCEDURE — G0123 SCREEN CERV/VAG THIN LAYER: HCPCS | Performed by: NURSE PRACTITIONER

## 2018-10-23 PROCEDURE — 87512 GARDNER VAG DNA QUANT: CPT | Performed by: NURSE PRACTITIONER

## 2018-10-23 PROCEDURE — 87481 CANDIDA DNA AMP PROBE: CPT | Performed by: NURSE PRACTITIONER

## 2018-10-23 RX ORDER — LISINOPRIL 30 MG/1
TABLET ORAL
COMMUNITY
End: 2019-02-27

## 2018-10-23 NOTE — PROGRESS NOTES
"Marisel Geller is a 43 y.o. female.     Annual exam.         The following portions of the patient's history were reviewed and updated as appropriate: allergies, current medications, past family history, past medical history, past social history, past surgical history and problem list.    /74 (BP Location: Left arm, Patient Position: Sitting, Cuff Size: Large Adult)   Ht 175.3 cm (69\")   Wt 97.5 kg (215 lb)   BMI 31.75 kg/m²     Review of Systems   Constitutional: Negative for activity change, appetite change, fatigue and fever.   HENT: Negative for congestion, sore throat and trouble swallowing.    Eyes: Negative for pain, discharge and visual disturbance.   Respiratory: Negative for apnea, shortness of breath and wheezing.    Cardiovascular: Negative for chest pain, palpitations and leg swelling.   Gastrointestinal: Negative for abdominal pain, constipation and diarrhea.   Genitourinary: Negative for frequency, pelvic pain, urgency and vaginal discharge.   Musculoskeletal: Negative for back pain and gait problem.   Skin: Negative for color change and rash.   Neurological: Negative for dizziness, weakness and numbness.   Psychiatric/Behavioral: Negative for confusion and sleep disturbance.       Objective   Physical Exam   Constitutional: She is oriented to person, place, and time. She appears well-developed and well-nourished. No distress.   HENT:   Head: Normocephalic.   Right Ear: External ear normal.   Left Ear: External ear normal.   Nose: Nose normal.   Mouth/Throat: Oropharynx is clear and moist.   Eyes: Conjunctivae are normal. Right eye exhibits no discharge. Left eye exhibits no discharge. No scleral icterus.   Neck: Normal range of motion. Neck supple. Carotid bruit is not present. No tracheal deviation present. No thyromegaly present.   Cardiovascular: Normal rate, regular rhythm, normal heart sounds and intact distal pulses.    No murmur heard.  Pulmonary/Chest: Effort normal and " breath sounds normal. No respiratory distress. She has no wheezes. Right breast exhibits no inverted nipple, no mass, no nipple discharge, no skin change and no tenderness. Left breast exhibits no inverted nipple, no mass, no nipple discharge, no skin change and no tenderness. Breasts are symmetrical. There is no breast swelling.   Abdominal: Soft. She exhibits no distension and no mass. There is no tenderness. There is no guarding. No hernia. Hernia confirmed negative in the right inguinal area and confirmed negative in the left inguinal area.   Genitourinary: Rectum normal, vagina normal and uterus normal. Rectal exam shows no mass. No breast tenderness, discharge or bleeding. Pelvic exam was performed with patient supine. There is no rash, tenderness, lesion or injury on the right labia. There is no rash, tenderness, lesion or injury on the left labia. Uterus is not enlarged, not fixed and not tender. Cervix exhibits no motion tenderness, no discharge and no friability. Right adnexum displays no mass, no tenderness and no fullness. Left adnexum displays no mass, no tenderness and no fullness. No erythema, tenderness or bleeding in the vagina. No foreign body in the vagina. No signs of injury around the vagina. No vaginal discharge found.   Genitourinary Comments:   BSU normal  Urethral meatus  Normal  Perineum  Normal   Musculoskeletal: Normal range of motion. She exhibits no edema or tenderness.   Lymphadenopathy:        Head (right side): No submental, no submandibular, no tonsillar, no preauricular, no posterior auricular and no occipital adenopathy present.        Head (left side): No submental, no submandibular, no tonsillar, no preauricular, no posterior auricular and no occipital adenopathy present.     She has no cervical adenopathy.        Right cervical: No superficial cervical, no deep cervical and no posterior cervical adenopathy present.       Left cervical: No superficial cervical, no deep cervical  and no posterior cervical adenopathy present.     She has no axillary adenopathy.        Right: No inguinal adenopathy present.        Left: No inguinal adenopathy present.   Neurological: She is alert and oriented to person, place, and time. Coordination normal.   Skin: Skin is warm and dry. No bruising and no rash noted. She is not diaphoretic. No erythema.   Psychiatric: She has a normal mood and affect. Her behavior is normal. Judgment and thought content normal.   Nursing note and vitals reviewed.      Assessment/Plan    Well woman exam. Pap collected. Mammogram ordered.   Pt reports PCP follows annual labs.   Patient's Body mass index is 31.75 kg/m². BMI is above normal parameters. Recommendations include: educational material.    RV annual exam/prn.     Zahida was seen today for gynecologic exam.    Diagnoses and all orders for this visit:    Encounter for gynecological examination without abnormal finding  -     Liquid-based Pap Smear, Screening    BMI 31.0-31.9,adult    Smoker    Encounter for screening mammogram for malignant neoplasm of breast  -     Mammo Screening Digital Tomosynthesis Bilateral With CAD; Future

## 2018-10-24 ENCOUNTER — HOSPITAL ENCOUNTER (OUTPATIENT)
Dept: WOMENS IMAGING | Age: 43
Discharge: HOME | End: 2018-10-24
Payer: MEDICAID

## 2018-10-24 DIAGNOSIS — Z12.31 VISIT FOR SCREENING MAMMOGRAM: ICD-10-CM

## 2018-10-24 PROCEDURE — 77063 BREAST TOMOSYNTHESIS BI: CPT

## 2018-10-26 ENCOUNTER — APPOINTMENT (OUTPATIENT)
Dept: CT IMAGING | Age: 43
End: 2018-10-26
Payer: MEDICAID

## 2018-10-26 ENCOUNTER — HOSPITAL ENCOUNTER (EMERGENCY)
Age: 43
Discharge: HOME | End: 2018-10-27
Payer: MEDICAID
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
## 2018-10-26 DIAGNOSIS — S09.90XA INJURY OF HEAD, INITIAL ENCOUNTER: Primary | ICD-10-CM

## 2018-10-26 PROCEDURE — 70450 CT HEAD/BRAIN W/O DYE: CPT

## 2018-10-26 PROCEDURE — 72125 CT NECK SPINE W/O DYE: CPT

## 2018-10-26 PROCEDURE — 99284 EMERGENCY DEPT VISIT MOD MDM: CPT

## 2018-10-26 ASSESSMENT — PAIN SCALES - WONG BAKER: WONGBAKER_NUMERICALRESPONSE: 2

## 2018-10-27 VITALS
RESPIRATION RATE: 18 BRPM | BODY MASS INDEX: 31.55 KG/M2 | HEART RATE: 90 BPM | TEMPERATURE: 97.7 F | SYSTOLIC BLOOD PRESSURE: 98 MMHG | HEIGHT: 69 IN | OXYGEN SATURATION: 95 % | WEIGHT: 213 LBS | DIASTOLIC BLOOD PRESSURE: 67 MMHG

## 2018-10-27 PROCEDURE — 99284 EMERGENCY DEPT VISIT MOD MDM: CPT | Performed by: NURSE PRACTITIONER

## 2018-10-27 ASSESSMENT — ENCOUNTER SYMPTOMS: VOMITING: 0

## 2018-10-29 LAB
GEN CATEG CVX/VAG CYTO-IMP: ABNORMAL
LAB AP CASE REPORT: ABNORMAL
LAB AP GYN ADDITIONAL INFORMATION: ABNORMAL
PATH INTERP SPEC-IMP: ABNORMAL
STAT OF ADQ CVX/VAG CYTO-IMP: ABNORMAL

## 2018-10-29 NOTE — PATIENT INSTRUCTIONS

## 2018-11-05 NOTE — PROGRESS NOTES
[facility] - PODIATRY    Today's Date: 18    Patient Name: Zahida Geller  MRN: [id number]  CSN: [id number]  PCP: Judith Mauro MD  Referring Provider: No ref. provider found    SUBJECTIVE     Chief Complaint   Patient presents with   • Diabetes     Patient is here for diabetic foot and nail care. PT c/o painful toenails and calluses. Denies pain at present time. 122mg/dl last blood sugar. PCP: Dr. Judith Mauro     HPI: Zahida Geller, a 43 y.o.female, comes to clinic as a(n) established patient presenting for diabetic foot exam, complaining of painful toenails and callus. Patient has h/o anxiety, asthma, back pain, depression, DM2, edema, HTN, TBI, previous substance abuse. Patient states that one of the calluses as come back and can be painful from time to time. Denies redness or drainage. Notes that her toenails are thick, long and painful. Feels that her left hallux nail is slightly ingrown. Due to neuropathy, she is uncomfortable cutting her own nails. Uses an AFO for Right side foot drop. Patient is NIDDM with last stated BG level of 122mg/dl. Denies pain today. Denies any constitutional symptoms. No other pedal complaints at this time.    Past Medical History:   Diagnosis Date   • Anxiety    • Asthma    • Chronic back pain    • Chronic hip pain    • Depression    • Diabetes mellitus (CMS/HCC)    • Edema of both legs    • Hypertension    • Restless leg syndrome    • Substance abuse in remission (CMS/HCC)    • Traumatic brain injury (CMS/HCC)      Past Surgical History:   Procedure Laterality Date   • BACK SURGERY      L5-L6 surgery   •  SECTION     • LEG SURGERY      Left leg jose ramon placement   • PEG TUBE INSERTION     • PEG TUBE REMOVAL     • TRACHEOSTOMY      with reversal   • TUBAL ABDOMINAL LIGATION       Family History   Problem Relation Age of Onset   • Heart murmur Mother    • Heart disease Father    • No Known Problems Brother    • No Known Problems Sister    • No  "Known Problems Brother      Social History     Socioeconomic History   • Marital status: Single     Spouse name: Not on file   • Number of children: Not on file   • Years of education: Not on file   • Highest education level: Not on file   Social Needs   • Financial resource strain: Not on file   • Food insecurity - worry: Not on file   • Food insecurity - inability: Not on file   • Transportation needs - medical: Not on file   • Transportation needs - non-medical: Not on file   Occupational History   • Not on file   Tobacco Use   • Smoking status: Current Every Day Smoker     Packs/day: 0.00     Types: Electronic Cigarette   • Smokeless tobacco: Never Used   Substance and Sexual Activity   • Alcohol use: No   • Drug use: No     Comment: history of, narcotics   • Sexual activity: No     Partners: Male     Comment: last act of intercourse a year ago   Other Topics Concern   • Not on file   Social History Narrative   • Not on file     Allergies   Allergen Reactions   • Penicillins Other (See Comments)     As a child   • Tramadol Nausea And Vomiting     Current Outpatient Medications   Medication Sig Dispense Refill   • albuterol (PROVENTIL HFA;VENTOLIN HFA) 108 (90 Base) MCG/ACT inhaler Inhale.     • amitriptyline (ELAVIL) 50 MG tablet Take  by mouth.     • Blood Glucose Monitoring Suppl (FREESTYLE FREEDOM LITE) w/Device kit      • budesonide-formoterol (SYMBICORT) 80-4.5 MCG/ACT inhaler Inhale.     • esomeprazole (NexIUM) 40 MG packet Take  by mouth.     • fluticasone (FLONASE) 50 MCG/ACT nasal spray into each nostril.     • furosemide (LASIX) 40 MG tablet Take  by mouth.     • guaiFENesin (ROBITUSSIN) 100 MG/5ML syrup Take 200 mg by mouth As Needed for Cough.     • ibuprofen (ADVIL,MOTRIN) 800 MG tablet Take 800 mg by mouth 3 (Three) Times a Day. Take 1 tablet by mouth three times daily for pain.     • Insulin Syringe-Needle U-100 30G X 5/16\" 0.5 ML misc      • lidocaine (LIDODERM) 5 % Place 1 patch on the skin " Daily As Needed for Moderate Pain (4-6). Remove & Discard patch within 12 hours or as directed by MD     • lisinopril (PRINIVIL,ZESTRIL) 30 MG tablet lisinopril 30 mg tablet   Take 1 tablet every day by oral route.     • loratadine (CLARITIN) 10 MG tablet Take  by mouth.     • medroxyPROGESTERone (DEPO-PROVERA) 150 MG/ML injection Inject  into the shoulder, thigh, or buttocks.     • metFORMIN (GLUCOPHAGE) 500 MG tablet Take  by mouth.     • metoprolol tartrate (LOPRESSOR) 100 MG tablet Take  by mouth.     • Multiple Vitamins-Minerals (THERA-M MULTIPLE VITAMINS PO) Take  by mouth.     • nateglinide (STARLIX) 60 MG tablet Take  by mouth.     • neomycin-bacitracin-polymyxin (NEOSPORIN) 5-400-5000 ointment Apply  topically As Needed.     • omeprazole (priLOSEC) 20 MG capsule Take 20 mg by mouth Daily.     • oxyCODONE-acetaminophen (PERCOCET)  MG per tablet Take 1 tablet by mouth 3 (Three) Times a Day.     • PARoxetine (PAXIL) 30 MG tablet Take  by mouth.     • phenol (SORE THROAT SPRAY) 1.4 % liquid liquid Apply  to the mouth or throat As Needed.     • pregabalin (LYRICA) 100 MG capsule Take  by mouth.     • propranolol (INDERAL) 20 MG tablet Take  by mouth.     • tiZANidine (ZANAFLEX) 4 MG tablet Take  by mouth.       No current facility-administered medications for this visit.      Review of Systems   Constitutional: Negative for chills and fever.   HENT: Negative for congestion.    Respiratory: Negative for shortness of breath.    Cardiovascular: Positive for leg swelling. Negative for chest pain.   Gastrointestinal: Negative for constipation, diarrhea, nausea and vomiting.   Musculoskeletal: Positive for gait problem.   Skin: Negative for wound.   Neurological: Positive for numbness.       OBJECTIVE     Vitals:    11/12/18 1058   BP: 118/68   Pulse: 85   SpO2: 96%       PHYSICAL EXAM  GEN:   A&Ox3, NAD. Pt presents to clinic ambulating with right AFO and wearing Casual Shoes. Accompanied by none.     NEURO:    Protective sensation intact to 9/10 sites Right foot, 9/10 site Left foot using [place] monofilament  Light touch sensation diminished  No Tinel's or Villeux sign.    VASC:  Skin temperature Warm to Warm proximal to distal gricel  DP pulses 2/4 Right, 2/4 Left  PT pulses 2/4 Right, 2/4 Left  CFT <3 sec gricel  Pedal hair growth absent  trace edema noted gricel  Varicosities absent gricel    MUSC/SKEL:  Muscle Strength Right foot Dorsiflexors 3+/5, Plantarflexors 5/5, Evertors 5/5, Invertors 5/5  Muscle Strength Left foot Dorsiflexors 5/5, Plantarflexors 5/5, Evertors 5/5, Invertors 5/5  ROM of the 1st MTP is full without pain or crepitus  ROM of the MTJ is full without pain or crepitus    ROM of the STJ is full without pain or crepitus    ROM of the ankle joint is full without pain or crepitus    Mild POP of left hallux  Rectus foot type   No gross pedal musculoskeletal deformities noted.     DERM:  Pedal nails x2 of left foot are thickened, elongated and discolored with presence of subungual debris  Webspaces are Clean, Dry, and Intact  Skin is normal in turgor and texture   Hyperkeratotic tissue to plantar right HIPJ with subdermal bleeding. Upon paring, no breaks in integument noted.  Hyperkeratotic tissue to medial left MTPJ. Upon paring, no breaks in integument.      RADIOLOGY/NUCLEAR:  No results found.    LABORATORY/CULTURE RESULTS:      PATHOLOGY RESULTS:       ASSESSMENT/PLAN     Zahida was seen today for diabetes.    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  Discussed findings and treatment plan including risks, benefits, and treatment options with patient in detail. Patient agreed with treatment plan.  After verbal consent obtained,  nail(s) x2 debrided of offending borders with nail nipper without incidence  After verbal consent obtained, calluses x2 pared utilizing dermal curette and/or scalpel without incidence  Patient may maintain nails and calluses at home utilizing emery board or pumice stone between visits as needed  Reviewed at home diabetic foot care including daily foot checks   Continue BG control.  Continue use of compression stockings and AFO  An After Visit Summary was printed and given to the patient at discharge, including (if requested) any available informative/educational handouts regarding diagnosis, treatment, or medications. All questions were answered to patient/family satisfaction. Should symptoms fail to improve or worsen they agree to call or return to clinic or to go to the Emergency Department. Discussed the importance of following up with any needed screening tests/labs/specialist appointments and any requested follow-up recommended by me today. Importance of maintaining follow-up discussed and patient accepts that missed appointments can delay diagnosis and potentially lead to worsening of conditions.  Return in about 3 months (around 2/12/2019)., or sooner if acute issues arise.        This document has been electronically signed by Christopher Dennis DPM on November 12, 2018 11:21 AM

## 2018-11-08 ENCOUNTER — TELEPHONE (OUTPATIENT)
Dept: PODIATRY | Facility: CLINIC | Age: 43
End: 2018-11-08

## 2018-11-08 NOTE — TELEPHONE ENCOUNTER
Called and reminded [facility] of appointment with Dr. Miles Dennis on November 12, 2018 at 11:00 am. [facility] gave verbal confirmation of appointment at this time.

## 2018-11-12 ENCOUNTER — OFFICE VISIT (OUTPATIENT)
Dept: PODIATRY | Facility: CLINIC | Age: 43
End: 2018-11-12

## 2018-11-12 VITALS
BODY MASS INDEX: 31.84 KG/M2 | WEIGHT: 215 LBS | SYSTOLIC BLOOD PRESSURE: 118 MMHG | HEART RATE: 85 BPM | HEIGHT: 69 IN | OXYGEN SATURATION: 96 % | DIASTOLIC BLOOD PRESSURE: 68 MMHG

## 2018-11-12 DIAGNOSIS — B35.1 ONYCHOMYCOSIS: Primary | ICD-10-CM

## 2018-11-12 DIAGNOSIS — L84 FOOT CALLUS: ICD-10-CM

## 2018-11-12 DIAGNOSIS — L60.0 INGROWN TOENAIL: ICD-10-CM

## 2018-11-12 DIAGNOSIS — S06.9X9D TRAUMATIC BRAIN INJURY WITH LOSS OF CONSCIOUSNESS, SUBSEQUENT ENCOUNTER: ICD-10-CM

## 2018-11-12 DIAGNOSIS — E11.49 DIABETES MELLITUS TYPE 2 WITH NEUROLOGICAL MANIFESTATIONS (HCC): ICD-10-CM

## 2018-11-12 PROCEDURE — 11720 DEBRIDE NAIL 1-5: CPT | Performed by: PODIATRIST

## 2018-11-12 PROCEDURE — 99212 OFFICE O/P EST SF 10 MIN: CPT | Performed by: PODIATRIST

## 2018-11-12 PROCEDURE — 11056 PARNG/CUTG B9 HYPRKR LES 2-4: CPT | Performed by: PODIATRIST

## 2018-11-13 ENCOUNTER — OFFICE VISIT (OUTPATIENT)
Dept: OBSTETRICS AND GYNECOLOGY | Facility: CLINIC | Age: 43
End: 2018-11-13

## 2018-11-13 VITALS
HEIGHT: 69 IN | WEIGHT: 214 LBS | SYSTOLIC BLOOD PRESSURE: 112 MMHG | DIASTOLIC BLOOD PRESSURE: 72 MMHG | BODY MASS INDEX: 31.7 KG/M2

## 2018-11-13 DIAGNOSIS — R87.612 LOW GRADE SQUAMOUS INTRAEPITHELIAL LESION ON CYTOLOGIC SMEAR OF CERVIX (LGSIL): Primary | ICD-10-CM

## 2018-11-13 LAB
B-HCG UR QL: NEGATIVE
INTERNAL NEGATIVE CONTROL: NEGATIVE
INTERNAL POSITIVE CONTROL: POSITIVE
Lab: NORMAL

## 2018-11-13 PROCEDURE — 57454 BX/CURETT OF CERVIX W/SCOPE: CPT | Performed by: OBSTETRICS & GYNECOLOGY

## 2018-11-13 PROCEDURE — 88305 TISSUE EXAM BY PATHOLOGIST: CPT | Performed by: OBSTETRICS & GYNECOLOGY

## 2018-11-13 RX ORDER — MEDROXYPROGESTERONE ACETATE 150 MG/ML
150 INJECTION, SUSPENSION INTRAMUSCULAR
COMMUNITY

## 2018-11-13 RX ORDER — TIZANIDINE 4 MG/1
4 TABLET ORAL 3 TIMES DAILY
COMMUNITY

## 2018-11-13 RX ORDER — LORATADINE 10 MG/1
10 TABLET ORAL DAILY
COMMUNITY

## 2018-11-13 RX ORDER — IBUPROFEN 800 MG/1
800 TABLET ORAL 3 TIMES DAILY
COMMUNITY

## 2018-11-13 RX ORDER — PREGABALIN 100 MG/1
100 CAPSULE ORAL
COMMUNITY
End: 2019-02-27

## 2018-11-13 RX ORDER — FUROSEMIDE 40 MG/1
40 TABLET ORAL DAILY
COMMUNITY

## 2018-11-13 RX ORDER — BUDESONIDE AND FORMOTEROL FUMARATE DIHYDRATE 80; 4.5 UG/1; UG/1
2 AEROSOL RESPIRATORY (INHALATION)
COMMUNITY

## 2018-11-13 RX ORDER — FLUTICASONE PROPIONATE 50 MCG
2 SPRAY, SUSPENSION (ML) NASAL DAILY
COMMUNITY

## 2018-11-13 RX ORDER — PAROXETINE 30 MG/1
30 TABLET, FILM COATED ORAL
COMMUNITY
End: 2022-08-11 | Stop reason: DRUGHIGH

## 2018-11-13 RX ORDER — OXYCODONE AND ACETAMINOPHEN 10; 325 MG/1; MG/1
TABLET ORAL
COMMUNITY
End: 2019-02-27

## 2018-11-13 RX ORDER — ALBUTEROL SULFATE 90 UG/1
2 AEROSOL, METERED RESPIRATORY (INHALATION) EVERY 4 HOURS PRN
COMMUNITY

## 2018-11-13 RX ORDER — PROPRANOLOL HYDROCHLORIDE 20 MG/1
20 TABLET ORAL
COMMUNITY
End: 2020-02-25

## 2018-11-13 RX ORDER — NATEGLINIDE 60 MG/1
60 TABLET ORAL
COMMUNITY
End: 2021-05-10

## 2018-11-13 RX ORDER — METOPROLOL TARTRATE 100 MG/1
100 TABLET ORAL 2 TIMES DAILY
COMMUNITY

## 2018-11-13 NOTE — PROGRESS NOTES
"Zahida Geller is a 43 y.o. female  here today for colposcopy.  Her most recent Pap smear was read as LGSIL.  She is on depo for contraception.     /72   Ht 175.3 cm (69\")   Wt 97.1 kg (214 lb)   BMI 31.60 kg/m²      A urine pregnancy test in the office today was negative.    Colposcopy was performed in the office today.  She was placed in the lithotomy position on the exam table.  A speculum was inserted and the cervix well visualized.  The cervix was cleansed with acetic acid swabs.  Inspection with the colposcope showed the transformation zone on the ectocervix.  It was seen in its entirety.  There were acetowhite lesions noted at 11 o'clock.  There was some active metaplasia noted.  Colposcopy was satisfactory. The cervix was anesthetized with Hurricaine spray.  A 11:00 biopsy was performed.  The biopsy site was made hemostatic with a silver nitrate stick.  An endocervical curettage was performed.    Colposcopic impression: benign    We will notify her when the pathology report is available to discuss further care.  We have discussed post colposcopy instructions.  "

## 2018-11-19 LAB
CYTO UR: NORMAL
CYTO UR: NORMAL
LAB AP CASE REPORT: NORMAL
LAB AP CASE REPORT: NORMAL
LAB AP CLINICAL INFORMATION: NORMAL
LAB AP CLINICAL INFORMATION: NORMAL
PATH REPORT.FINAL DX SPEC: NORMAL
PATH REPORT.FINAL DX SPEC: NORMAL
PATH REPORT.GROSS SPEC: NORMAL
PATH REPORT.GROSS SPEC: NORMAL

## 2018-12-20 ENCOUNTER — TELEPHONE (OUTPATIENT)
Dept: PODIATRY | Facility: CLINIC | Age: 43
End: 2018-12-20

## 2018-12-20 NOTE — TELEPHONE ENCOUNTER
Called and notified [facility] ([facility]) that Dr. Dennis will be out of the office on March 1, 2019. Patient is now scheduled for February 27, 2019 at 10:15 am. Monik gave verbal understanding of appointment date and time change. Reminder letter will be mailed as well.

## 2019-02-08 ENCOUNTER — HOSPITAL ENCOUNTER (EMERGENCY)
Age: 44
Discharge: HOME | End: 2019-02-09
Payer: MEDICAID
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
## 2019-02-08 DIAGNOSIS — S09.90XA CLOSED HEAD INJURY, INITIAL ENCOUNTER: Primary | ICD-10-CM

## 2019-02-08 PROCEDURE — 99283 EMERGENCY DEPT VISIT LOW MDM: CPT

## 2019-02-08 PROCEDURE — 99284 EMERGENCY DEPT VISIT MOD MDM: CPT | Performed by: NURSE PRACTITIONER

## 2019-02-08 RX ORDER — ESOMEPRAZOLE MAGNESIUM 40 MG/1
20 FOR SUSPENSION ORAL DAILY
COMMUNITY

## 2019-02-08 ASSESSMENT — PAIN SCALES - GENERAL: PAINLEVEL_OUTOF10: 9

## 2019-02-09 ENCOUNTER — APPOINTMENT (OUTPATIENT)
Dept: CT IMAGING | Age: 44
End: 2019-02-09
Payer: MEDICAID

## 2019-02-09 VITALS
RESPIRATION RATE: 18 BRPM | HEIGHT: 69 IN | HEART RATE: 82 BPM | SYSTOLIC BLOOD PRESSURE: 148 MMHG | DIASTOLIC BLOOD PRESSURE: 79 MMHG | OXYGEN SATURATION: 97 % | WEIGHT: 213 LBS | BODY MASS INDEX: 31.55 KG/M2 | TEMPERATURE: 98.2 F

## 2019-02-09 PROCEDURE — 70450 CT HEAD/BRAIN W/O DYE: CPT

## 2019-02-09 PROCEDURE — 72125 CT NECK SPINE W/O DYE: CPT

## 2019-02-09 ASSESSMENT — ENCOUNTER SYMPTOMS: VOMITING: 0

## 2019-02-10 ENCOUNTER — APPOINTMENT (OUTPATIENT)
Dept: CT IMAGING | Age: 44
End: 2019-02-10
Payer: MEDICAID

## 2019-02-10 ENCOUNTER — HOSPITAL ENCOUNTER (EMERGENCY)
Age: 44
Discharge: HOME | End: 2019-02-10
Payer: MEDICAID

## 2019-02-10 VITALS
OXYGEN SATURATION: 98 % | DIASTOLIC BLOOD PRESSURE: 78 MMHG | HEART RATE: 78 BPM | HEIGHT: 69 IN | SYSTOLIC BLOOD PRESSURE: 122 MMHG | BODY MASS INDEX: 32.58 KG/M2 | WEIGHT: 220 LBS | RESPIRATION RATE: 20 BRPM | TEMPERATURE: 98 F

## 2019-02-10 DIAGNOSIS — S01.112A LACERATION OF LEFT EYEBROW, INITIAL ENCOUNTER: Primary | ICD-10-CM
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
## 2019-02-10 PROCEDURE — 2500000003 HC RX 250 WO HCPCS

## 2019-02-10 PROCEDURE — 12013 RPR F/E/E/N/L/M 2.6-5.0 CM: CPT

## 2019-02-10 PROCEDURE — 99284 EMERGENCY DEPT VISIT MOD MDM: CPT | Performed by: NURSE PRACTITIONER

## 2019-02-10 PROCEDURE — 90715 TDAP VACCINE 7 YRS/> IM: CPT

## 2019-02-10 PROCEDURE — 99283 EMERGENCY DEPT VISIT LOW MDM: CPT

## 2019-02-10 PROCEDURE — 90471 IMMUNIZATION ADMIN: CPT

## 2019-02-10 PROCEDURE — 12013 RPR F/E/E/N/L/M 2.6-5.0 CM: CPT | Performed by: NURSE PRACTITIONER

## 2019-02-10 PROCEDURE — 6360000002 HC RX W HCPCS

## 2019-02-10 PROCEDURE — 70486 CT MAXILLOFACIAL W/O DYE: CPT

## 2019-02-10 RX ORDER — LIDOCAINE HYDROCHLORIDE 10 MG/ML
20 INJECTION, SOLUTION EPIDURAL; INFILTRATION; INTRACAUDAL; PERINEURAL ONCE
Status: DISCONTINUED | OUTPATIENT
Start: 2019-02-10 | End: 2019-02-10 | Stop reason: HOSPADM

## 2019-02-10 RX ORDER — LIDOCAINE HYDROCHLORIDE AND EPINEPHRINE 10; 10 MG/ML; UG/ML
20 INJECTION, SOLUTION INFILTRATION; PERINEURAL ONCE
Status: DISCONTINUED | OUTPATIENT
Start: 2019-02-10 | End: 2019-02-10 | Stop reason: HOSPADM

## 2019-02-10 RX ORDER — LIDOCAINE HYDROCHLORIDE AND EPINEPHRINE 10; 10 MG/ML; UG/ML
INJECTION, SOLUTION INFILTRATION; PERINEURAL
Status: COMPLETED
Start: 2019-02-10 | End: 2019-02-10

## 2019-02-10 RX ADMIN — LIDOCAINE HYDROCHLORIDE AND EPINEPHRINE 20 ML: 10; 10 INJECTION, SOLUTION INFILTRATION; PERINEURAL at 01:50

## 2019-02-10 RX ADMIN — TETANUS TOXOID, REDUCED DIPHTHERIA TOXOID AND ACELLULAR PERTUSSIS VACCINE, ADSORBED 0.5 ML: 5; 2.5; 8; 8; 2.5 SUSPENSION INTRAMUSCULAR at 01:51

## 2019-02-10 ASSESSMENT — PAIN SCALES - GENERAL
PAINLEVEL_OUTOF10: 0
PAINLEVEL_OUTOF10: 0

## 2019-02-13 ENCOUNTER — HOSPITAL ENCOUNTER (EMERGENCY)
Age: 44
Discharge: HOME | End: 2019-02-13
Payer: MEDICAID

## 2019-02-13 ENCOUNTER — APPOINTMENT (OUTPATIENT)
Dept: CT IMAGING | Age: 44
End: 2019-02-13
Payer: MEDICAID

## 2019-02-13 VITALS
HEART RATE: 78 BPM | WEIGHT: 210 LBS | BODY MASS INDEX: 31.1 KG/M2 | HEIGHT: 69 IN | DIASTOLIC BLOOD PRESSURE: 89 MMHG | OXYGEN SATURATION: 96 % | RESPIRATION RATE: 20 BRPM | SYSTOLIC BLOOD PRESSURE: 144 MMHG | TEMPERATURE: 98.1 F

## 2019-02-13 DIAGNOSIS — L03.113 CELLULITIS OF RIGHT UPPER EXTREMITY: ICD-10-CM

## 2019-02-13 DIAGNOSIS — S09.90XA CLOSED HEAD INJURY, INITIAL ENCOUNTER: Primary | ICD-10-CM

## 2019-02-13 PROCEDURE — 99284 EMERGENCY DEPT VISIT MOD MDM: CPT | Performed by: PHYSICIAN ASSISTANT

## 2019-02-13 PROCEDURE — 70450 CT HEAD/BRAIN W/O DYE: CPT

## 2019-02-13 PROCEDURE — 99284 EMERGENCY DEPT VISIT MOD MDM: CPT

## 2019-02-13 RX ORDER — CEPHALEXIN 500 MG/1
500 CAPSULE ORAL 3 TIMES DAILY
Qty: 30 CAPSULE | Refills: 0 | Status: SHIPPED | OUTPATIENT
Start: 2019-02-13 | End: 2019-02-23

## 2019-02-13 ASSESSMENT — ENCOUNTER SYMPTOMS
NAUSEA: 0
ABDOMINAL DISTENTION: 0
COUGH: 0
APNEA: 0
WHEEZING: 0
EYE PAIN: 0
STRIDOR: 0
SHORTNESS OF BREATH: 0
FACIAL SWELLING: 1
EYE DISCHARGE: 0
ABDOMINAL PAIN: 0
COLOR CHANGE: 0
BACK PAIN: 0
PHOTOPHOBIA: 0

## 2019-02-13 ASSESSMENT — PAIN SCALES - GENERAL: PAINLEVEL_OUTOF10: 6

## 2019-02-13 ASSESSMENT — PAIN DESCRIPTION - LOCATION: LOCATION: HEAD

## 2019-02-25 NOTE — PROGRESS NOTES
[facility] - PODIATRY    Today's Date: 19    Patient Name: Zahida Geller  MRN: [id number]  CSN: [id number]  PCP: Judith Mauro MD  Referring Provider: No ref. provider found    SUBJECTIVE     Chief Complaint   Patient presents with   • Follow-up     3 month f/u for diabetic foot and nail care. pt  c/o of thickened toe nails , possible ingrown toenail on the left great toenail, calluses,   pain scale 6/10    • Diabetes     last blood sugar 173mg/dl  Dr. Mauro PCP last visit      HPI: Zahida Geller, a 43 y.o.female, comes to clinic as a(n) established patient presenting for diabetic foot exam, complaining of painful toenails and callus. Patient has h/o anxiety, asthma, back pain, depression, DM2, edema, HTN, TBI, previous substance abuse. Patient states that one of the calluses as come back and can be painful from time to time. Denies redness or drainage. Notes that her toenails are thick, long and painful. Feels that her left hallux nail is slightly ingrown. Due to neuropathy, she is uncomfortable cutting her own nails. Uses an AFO for Right side foot drop. Patient is NIDDM with last stated BG level of 173mg/dl. Admits pain at 6/10 level and described as aching, numbness and tingling. Denies any constitutional symptoms. No other pedal complaints at this time.    Past Medical History:   Diagnosis Date   • Anxiety    • Asthma    • Chronic back pain    • Chronic hip pain    • Depression    • Diabetes mellitus (CMS/HCC)    • Edema of both legs    • Hypertension    • Restless leg syndrome    • Substance abuse in remission (CMS/HCC)    • Traumatic brain injury (CMS/HCC)      Past Surgical History:   Procedure Laterality Date   • BACK SURGERY      L5-L6 surgery   •  SECTION     • LEG SURGERY      Left leg jose ramon placement   • PEG TUBE INSERTION     • PEG TUBE REMOVAL     • TRACHEOSTOMY      with reversal   • TUBAL ABDOMINAL LIGATION       Family History   Problem Relation Age of Onset  "  • Heart murmur Mother    • Heart disease Father    • No Known Problems Brother    • No Known Problems Sister    • No Known Problems Brother      Social History     Socioeconomic History   • Marital status: Single     Spouse name: Not on file   • Number of children: Not on file   • Years of education: Not on file   • Highest education level: Not on file   Social Needs   • Financial resource strain: Not on file   • Food insecurity - worry: Not on file   • Food insecurity - inability: Not on file   • Transportation needs - medical: Not on file   • Transportation needs - non-medical: Not on file   Occupational History   • Not on file   Tobacco Use   • Smoking status: Current Every Day Smoker     Packs/day: 0.00     Types: Electronic Cigarette   • Smokeless tobacco: Never Used   Substance and Sexual Activity   • Alcohol use: No   • Drug use: No     Comment: history of, narcotics   • Sexual activity: No     Partners: Male     Comment: last act of intercourse a year ago   Other Topics Concern   • Not on file   Social History Narrative   • Not on file     Allergies   Allergen Reactions   • Penicillins Other (See Comments)     As a child   • Tramadol Nausea And Vomiting     Current Outpatient Medications   Medication Sig Dispense Refill   • albuterol (PROVENTIL HFA;VENTOLIN HFA) 108 (90 Base) MCG/ACT inhaler Inhale.     • amitriptyline (ELAVIL) 50 MG tablet Take  by mouth.     • Blood Glucose Monitoring Suppl (FREESTYLE FREEDOM LITE) w/Device kit      • budesonide-formoterol (SYMBICORT) 80-4.5 MCG/ACT inhaler Inhale.     • fluticasone (FLONASE) 50 MCG/ACT nasal spray into the nostril(s) as directed by provider.     • furosemide (LASIX) 40 MG tablet Take 40 mg by mouth.     • guaiFENesin (ROBITUSSIN) 100 MG/5ML syrup Take 200 mg by mouth As Needed for Cough.     • ibuprofen (ADVIL,MOTRIN) 800 MG tablet Take 800 mg by mouth.     • Insulin Syringe-Needle U-100 30G X 5/16\" 0.5 ML misc      • lidocaine (LIDODERM) 5 % Place 1 " patch on the skin Daily As Needed for Moderate Pain (4-6). Remove & Discard patch within 12 hours or as directed by MD     • loratadine (CLARITIN) 10 MG tablet Take 10 mg by mouth.     • medroxyPROGESTERone (DEPO-PROVERA) 150 MG/ML injection Inject 150 mg into the appropriate muscle as directed by prescriber.     • melatonin 1 MG tablet Take 3 mg by mouth At Night As Needed for Sleep.     • metFORMIN (GLUCOPHAGE) 500 MG tablet Take 850 mg by mouth.     • metoprolol tartrate (LOPRESSOR) 100 MG tablet Take 100 mg by mouth 2 (Two) Times a Day.     • nateglinide (STARLIX) 60 MG tablet Take 60 mg by mouth.     • neomycin-bacitracin-polymyxin (NEOSPORIN) 5-400-5000 ointment Apply  topically As Needed.     • pantoprazole (PROTONIX) 40 MG EC tablet Take 40 mg by mouth Daily.     • PARoxetine (PAXIL) 30 MG tablet Take 30 mg by mouth.     • phenol (SORE THROAT SPRAY) 1.4 % liquid liquid Apply  to the mouth or throat As Needed.     • propranolol (INDERAL) 20 MG tablet Take 20 mg by mouth.     • tiZANidine (ZANAFLEX) 4 MG tablet Take 4 mg by mouth.     • Multiple Vitamins-Minerals (THERA-M MULTIPLE VITAMINS PO) Take  by mouth.       No current facility-administered medications for this visit.      Review of Systems   Constitutional: Negative for chills and fever.   HENT: Negative for congestion.    Respiratory: Negative for shortness of breath.    Cardiovascular: Positive for leg swelling. Negative for chest pain.   Gastrointestinal: Negative for constipation, diarrhea, nausea and vomiting.   Musculoskeletal: Positive for gait problem.   Skin: Negative for wound.   Neurological: Positive for numbness.       OBJECTIVE     Vitals:    02/27/19 1036   BP: 106/60   Pulse: 81   SpO2: 98%       PHYSICAL EXAM  GEN:   A&Ox3, NAD. Pt presents to clinic ambulating with right AFO and wearing Casual Shoes. Accompanied by none.     NEURO:   Protective sensation intact to 9/10 sites Right foot, 9/10 site Left foot using [place]  monofilament  Light touch sensation diminished  No Tinel's or Villeux sign.    VASC:  Skin temperature Warm to Warm proximal to distal gricel  DP pulses 2/4 Right, 2/4 Left  PT pulses 2/4 Right, 2/4 Left  CFT <3 sec gricel  Pedal hair growth absent  trace edema noted gricel  Varicosities absent gricel    MUSC/SKEL:  Muscle Strength Right foot Dorsiflexors 3+/5, Plantarflexors 5/5, Evertors 5/5, Invertors 5/5  Muscle Strength Left foot Dorsiflexors 5/5, Plantarflexors 5/5, Evertors 5/5, Invertors 5/5  ROM of the 1st MTP is full without pain or crepitus  ROM of the MTJ is full without pain or crepitus    ROM of the STJ is full without pain or crepitus    ROM of the ankle joint is full without pain or crepitus    Mild POP of left hallux  Rectus foot type   No gross pedal musculoskeletal deformities noted.     DERM:  Pedal nails x2 of left foot are thickened, elongated and discolored with presence of subungual debris  Webspaces are Clean, Dry, and Intact  Skin is normal in turgor and texture   Hyperkeratotic tissue to plantar right HIPJ with subdermal bleeding. Upon paring, no breaks in integument noted.  Hyperkeratotic tissue to medial left MTPJ. Upon paring, no breaks in integument.      RADIOLOGY/NUCLEAR:  No results found.    LABORATORY/CULTURE RESULTS:      PATHOLOGY RESULTS:       ASSESSMENT/PLAN     Zahida was seen today for follow-up and diabetes.    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  Discussed findings and treatment plan including risks, benefits, and treatment options with patient in detail. Patient agreed with treatment plan.  After verbal consent obtained, nail(s) x10 debrided of length and thickness with nail nipper without incidence  After  verbal consent obtained, nail(s) x2 debrided of offending borders with nail nipper without incidence  After verbal consent obtained, calluses x2 pared utilizing dermal curette and/or scalpel without incidence  Patient may maintain nails and calluses at home utilizing emery board or pumice stone between visits as needed  Reviewed at home diabetic foot care including daily foot checks   Continue BG control.  Continue use of compression stockings and AFO  Discussed potential TNA of hallux nail if continues to ingrow  An After Visit Summary was printed and given to the patient at discharge, including (if requested) any available informative/educational handouts regarding diagnosis, treatment, or medications. All questions were answered to patient/family satisfaction. Should symptoms fail to improve or worsen they agree to call or return to clinic or to go to the Emergency Department. Discussed the importance of following up with any needed screening tests/labs/specialist appointments and any requested follow-up recommended by me today. Importance of maintaining follow-up discussed and patient accepts that missed appointments can delay diagnosis and potentially lead to worsening of conditions.  Return in about 3 months (around 5/27/2019)., or sooner if acute issues arise.        This document has been electronically signed by Christopher Dennis DPM on February 27, 2019 10:52 AM

## 2019-02-26 ENCOUNTER — TELEPHONE (OUTPATIENT)
Dept: PODIATRY | Facility: CLINIC | Age: 44
End: 2019-02-26

## 2019-02-26 NOTE — TELEPHONE ENCOUNTER
Called [facility] staff and reminded them of patient's appointment with Dr. Miles Dennis on February 27, 2019 at 10:15 am. Nursing staff gave verbal confirmation of appointment at this time.

## 2019-02-27 ENCOUNTER — OFFICE VISIT (OUTPATIENT)
Dept: PODIATRY | Facility: CLINIC | Age: 44
End: 2019-02-27

## 2019-02-27 VITALS
BODY MASS INDEX: 31.7 KG/M2 | HEART RATE: 81 BPM | WEIGHT: 214 LBS | HEIGHT: 69 IN | DIASTOLIC BLOOD PRESSURE: 60 MMHG | OXYGEN SATURATION: 98 % | SYSTOLIC BLOOD PRESSURE: 106 MMHG

## 2019-02-27 DIAGNOSIS — B35.1 ONYCHOMYCOSIS: ICD-10-CM

## 2019-02-27 DIAGNOSIS — S06.9X9D TRAUMATIC BRAIN INJURY WITH LOSS OF CONSCIOUSNESS, SUBSEQUENT ENCOUNTER: ICD-10-CM

## 2019-02-27 DIAGNOSIS — L84 FOOT CALLUS: ICD-10-CM

## 2019-02-27 DIAGNOSIS — E11.49 DIABETES MELLITUS TYPE 2 WITH NEUROLOGICAL MANIFESTATIONS (HCC): ICD-10-CM

## 2019-02-27 DIAGNOSIS — L60.0 INGROWN TOENAIL: Primary | ICD-10-CM

## 2019-02-27 PROCEDURE — 99213 OFFICE O/P EST LOW 20 MIN: CPT | Performed by: PODIATRIST

## 2019-02-27 PROCEDURE — 11721 DEBRIDE NAIL 6 OR MORE: CPT | Performed by: PODIATRIST

## 2019-02-27 PROCEDURE — 11056 PARNG/CUTG B9 HYPRKR LES 2-4: CPT | Performed by: PODIATRIST

## 2019-02-27 RX ORDER — UREA 10 %
3 LOTION (ML) TOPICAL NIGHTLY PRN
COMMUNITY
End: 2020-11-09

## 2019-02-27 RX ORDER — PANTOPRAZOLE SODIUM 40 MG/1
40 TABLET, DELAYED RELEASE ORAL DAILY
COMMUNITY
End: 2019-04-26

## 2019-04-26 ENCOUNTER — OFFICE VISIT (OUTPATIENT)
Dept: OBSTETRICS AND GYNECOLOGY | Facility: CLINIC | Age: 44
End: 2019-04-26

## 2019-04-26 VITALS
BODY MASS INDEX: 33.92 KG/M2 | SYSTOLIC BLOOD PRESSURE: 110 MMHG | DIASTOLIC BLOOD PRESSURE: 74 MMHG | HEIGHT: 69 IN | WEIGHT: 229 LBS

## 2019-04-26 DIAGNOSIS — R23.2 HOT FLASHES: Primary | ICD-10-CM

## 2019-04-26 PROCEDURE — 99213 OFFICE O/P EST LOW 20 MIN: CPT | Performed by: OBSTETRICS & GYNECOLOGY

## 2019-04-26 RX ORDER — ESOMEPRAZOLE MAGNESIUM 40 MG/1
40 FOR SUSPENSION ORAL
COMMUNITY
End: 2022-08-11 | Stop reason: DRUGHIGH

## 2019-04-26 RX ORDER — OXYCODONE AND ACETAMINOPHEN 10; 325 MG/1; MG/1
1 TABLET ORAL 3 TIMES DAILY
COMMUNITY

## 2019-04-26 NOTE — PROGRESS NOTES
"Subjective   Zahida Geller is a 43 y.o. female.     Chief Complaint   Patient presents with   • Hot Flashes     Pt here to discuss her hot flashes and hormones.        44 yo para 1 presents due to hot flashes. Patient reports that she has noticed hot flashes occurring occasionally for the past several years. Patient was unable to give definitive time frame at this time as to how long her hot flashes have been present. Has a previous history of a traumatic brain injury Patient is currently on Depo-Provera for menorrhagia and has been on depo- provera for the past five years. Reports occasional vaginal spotting while on depo-provera. Patient also previously had a tubal ligation. Reports that she smokes an e-cigarette at this time.          Review of Systems   Constitutional: Positive for diaphoresis.   Genitourinary: Positive for menstrual problem and vaginal bleeding.       Objective   /74 (BP Location: Left arm, Patient Position: Sitting, Cuff Size: Adult)   Ht 175.3 cm (69\")   Wt 104 kg (229 lb)   BMI 33.82 kg/m²   No LMP recorded. Patient has had an injection.  Physical Exam   Constitutional: She appears well-developed and well-nourished.   HENT:   Head: Normocephalic.   Right Ear: External ear normal.   Left Ear: External ear normal.   Nose: Nose normal.   Eyes: Right eye exhibits no discharge. Left eye exhibits no discharge.   Cardiovascular: Normal rate and regular rhythm.   Pulmonary/Chest: Effort normal and breath sounds normal. No stridor. She has no wheezes. She has no rales.   Skin: Skin is warm and dry.   Nursing note and vitals reviewed.    Assessment/Plan   Problems Addressed this Visit     None      Visit Diagnoses     Hot flashes    -  Primary    Relevant Orders    TSH (Completed)    Follicle Stimulating Hormone (Completed)    Estradiol (Completed)    Progesterone (Completed)    Luteinizing Hormone (Completed)      -Discussed with patient that to accurately know if the patient has gone " through menopause that she would have to come off depo-provera. Patient declined.   -Discussed with patient that hot flashes are unlikely due to perimenopausal or menopausal symptoms. Discussed with patient that average age of menopause is 51. Also discussed with patient that due to her electronic cigarettes use she would not be a candidate for hormone replacement therapy at this time.   -Discussed with patient hot flashes could be a side effect of one of her medications (antidepressant, muscle relaxant) or her previous traumatic brain injury.   -Labs drawn with results to follow. Discussed with patient that the labs would not show whether or not she has gone through menopause.   -Could consider switching patient to a different SSRI for possible management of hot flashes at this time.        Laina Michael, DO

## 2019-04-27 LAB
ESTRADIOL SERPL-MCNC: 44.2 PG/ML
FSH SERPL-ACNC: 9.1 MIU/ML
LH SERPL-ACNC: 7.1 MIU/ML
PROGEST SERPL-MCNC: <0.1 NG/ML
TSH SERPL DL<=0.005 MIU/L-ACNC: 2.64 MIU/ML (ref 0.27–4.2)

## 2019-05-28 ENCOUNTER — OFFICE VISIT (OUTPATIENT)
Dept: URGENT CARE | Age: 44
End: 2019-05-28
Payer: MEDICAID

## 2019-05-28 VITALS
BODY MASS INDEX: 34.51 KG/M2 | SYSTOLIC BLOOD PRESSURE: 133 MMHG | WEIGHT: 233 LBS | DIASTOLIC BLOOD PRESSURE: 88 MMHG | HEIGHT: 69 IN | TEMPERATURE: 98 F | OXYGEN SATURATION: 97 % | HEART RATE: 91 BPM

## 2019-05-28 DIAGNOSIS — L23.9 ALLERGIC CONTACT DERMATITIS, UNSPECIFIED TRIGGER: Primary | ICD-10-CM

## 2019-05-28 PROCEDURE — 99212 OFFICE O/P EST SF 10 MIN: CPT | Performed by: NURSE PRACTITIONER

## 2019-05-28 RX ORDER — METHYLPREDNISOLONE ACETATE 40 MG/ML
40 INJECTION, SUSPENSION INTRA-ARTICULAR; INTRALESIONAL; INTRAMUSCULAR; SOFT TISSUE ONCE
Status: COMPLETED | OUTPATIENT
Start: 2019-05-28 | End: 2019-05-28

## 2019-05-28 RX ADMIN — METHYLPREDNISOLONE ACETATE 40 MG: 40 INJECTION, SUSPENSION INTRA-ARTICULAR; INTRALESIONAL; INTRAMUSCULAR; SOFT TISSUE at 11:46

## 2019-05-28 ASSESSMENT — ENCOUNTER SYMPTOMS
COUGH: 0
SHORTNESS OF BREATH: 0
ABDOMINAL PAIN: 0
SORE THROAT: 0
SINUS PRESSURE: 0
BACK PAIN: 0
ALLERGIC/IMMUNOLOGIC NEGATIVE: 1
EYES NEGATIVE: 1

## 2019-05-28 NOTE — PROGRESS NOTES
[address] [facility] CARE  [facility] [address]  Dept: [phone]  Loc: [phone]    Corinne Arena is a 41 y.o. female who presents today for her medical conditions/complaintsas noted below. Corinne Arena is c/o of Rash (Arms, legs )        HPI:     HPI   Lilliana Ambrosio is here today with rash to bilateral arms, abdomen, lower legs for 3 days. She went out for a visit with her mother for a week and did use a different laundry detergent and then noticed the rash a couple of days later. The rash was spreading but now has improved slightly     Past Medical History:   Diagnosis Date    Anxiety     Asthma     Chronic back pain     Chronic hip pain     Depression     Diabetes mellitus (HCC)     Edema     Encephalomalacia     Hypertension     RLS (restless legs syndrome)     Substance abuse (HCC)     TBI (traumatic brain injury) ([facility] [address]      Past Surgical History:   Procedure Laterality Date    BACK SURGERY      LEG SURGERY Left     TUBAL LIGATION         History reviewed. No pertinent family history.     Social History     Tobacco Use    Smoking status: Current Every Day Smoker     Packs/day: 1.00     Types: Cigarettes    Smokeless tobacco: Never Used   Substance Use Topics    Alcohol use: No      Current Outpatient Medications   Medication Sig Dispense Refill    esomeprazole Magnesium (NEXIUM) 40 MG PACK Take 40 mg by mouth daily      melatonin 3 MG TABS tablet Take 3 mg by mouth daily      nitrofurantoin (MACRODANTIN) 100 MG capsule Take 100 mg by mouth 4 times daily      albuterol sulfate  (90 BASE) MCG/ACT inhaler Inhale 2 puffs into the lungs every 4 hours as needed for Wheezing      amitriptyline (ELAVIL) 50 MG tablet Take 50 mg by mouth nightly      budesonide-formoterol (SYMBICORT) 80-4.5 MCG/ACT AERO Inhale 2 puffs into the lungs 2 times daily      fluticasone (FLONASE) 50 MCG/ACT nasal spray 2 sprays by Nasal route daily  furosemide (LASIX) 40 MG tablet Take 40 mg by mouth daily      ibuprofen (ADVIL;MOTRIN) 800 MG tablet Take 800 mg by mouth every 8 hours as needed for Pain      lidocaine (LIDODERM) Place 1 patch onto the skin nightly      loratadine (CLARITIN) 10 MG tablet Take 10 mg by mouth daily      medroxyPROGESTERone (DEPO-PROVERA) 150 MG/ML injection Inject 150 mg into the muscle every 3 months      metFORMIN (GLUCOPHAGE) 500 MG tablet Take 850 mg by mouth 2 times daily (with meals)       metoprolol (LOPRESSOR) 100 MG tablet Take 100 mg by mouth 2 times daily      Multiple Vitamin (THERA PO) Take 1 tablet by mouth daily      nateglinide (STARLIX) 60 MG tablet Take 60 mg by mouth 3 times daily (before meals)      oxyCODONE-acetaminophen (PERCOCET)  MG per tablet Take 1 tablet by mouth 3 times daily as needed for Pain .  PARoxetine (PAXIL) 30 MG tablet Take 30 mg by mouth every morning      pregabalin (LYRICA) 100 MG capsule Take 100 mg by mouth 3 times daily      propranolol (INDERAL) 20 MG tablet Take 20 mg by mouth every 6 hours as needed      tiZANidine (ZANAFLEX) 4 MG tablet Take 4 mg by mouth 3 times daily      fluconazole (DIFLUCAN) 150 MG tablet Take 150 mg by mouth once       No current facility-administered medications for this visit. Allergies   Allergen Reactions    Pcn [Penicillins]     Tramadol        Health Maintenance   Topic Date Due    Pneumococcal 0-64 years Vaccine (1 of 1 - PPSV23) 09/19/1983    HIV screen  09/19/1992    Cervical cancer screen  09/19/1998    Lipid screen  09/19/2017    Diabetes screen  09/19/2017    Potassium monitoring  11/05/2018    Creatinine monitoring  11/05/2018    Flu vaccine (Season Ended) 09/01/2019    DTaP/Tdap/Td vaccine (2 - Td) 02/10/2029       Subjective:     Review of Systems   Constitutional: Negative for activity change, appetite change, chills and fever.    HENT: Negative for congestion, ear discharge, sinus pressure and sore throat. Eyes: Negative. Respiratory: Negative for cough and shortness of breath. Cardiovascular: Negative. Gastrointestinal: Negative for abdominal pain. Endocrine: Negative. Genitourinary: Negative for dysuria, frequency and urgency. Musculoskeletal: Negative for arthralgias, back pain and myalgias. Skin: Positive for rash. Rash to arms, legs , abdomen, back   Allergic/Immunologic: Negative. Neurological: Negative for weakness and headaches. Hematological: Negative. Psychiatric/Behavioral: Negative.        :Objective      Physical Exam   Constitutional: She is oriented to person, place, and time. Vital signs are normal. She appears well-developed and well-nourished. She is active and cooperative. Non-toxic appearance. No distress. HENT:   Head: Normocephalic. Right Ear: External ear normal.   Left Ear: External ear normal.   Nose: Nose normal.   Mouth/Throat: Mucous membranes are normal.   Eyes: Pupils are equal, round, and reactive to light. Conjunctivae and lids are normal. Right eye exhibits no discharge. Left eye exhibits no discharge. Neck: Trachea normal and full passive range of motion without pain. Neck supple. Cardiovascular: Normal rate, regular rhythm, S1 normal, S2 normal and normal heart sounds. Exam reveals no gallop and no friction rub. No murmur heard. Pulmonary/Chest: Effort normal. No respiratory distress. She exhibits no tenderness. Abdominal: Soft. Normal appearance. Musculoskeletal: Normal range of motion. She exhibits no edema, tenderness or deformity. Lymphadenopathy:     She has no cervical adenopathy. Neurological: She is alert and oriented to person, place, and time. Skin: Skin is warm, dry and intact. Capillary refill takes less than 2 seconds. Rash noted. Rash is maculopapular and urticarial. Rash is not pustular and not vesicular. No cyanosis or erythema. No pallor.    Diffuse red maculopapular rash to bilateral arms, lower legs, abdomen and mid upper back   Psychiatric: She has a normal mood and affect. Her behavior is normal. Her speech is delayed. Nursing note and vitals reviewed. /88   Pulse 91   Temp 98 °F (36.7 °C) (Temporal)   Ht 5' 9\" (1.753 m)   Wt 233 lb (105.7 kg)   SpO2 97%   BMI 34.41 kg/m²     :Assessment       Diagnosis Orders   1. Allergic contact dermatitis, unspecified trigger  methylPREDNISolone acetate (DEPO-MEDROL) injection 40 mg       :Plan    No orders of the defined types were placed in this encounter. Return if symptoms worsen or fail to improve. Orders Placed This Encounter   Medications    methylPREDNISolone acetate (DEPO-MEDROL) injection 40 mg        Patient Instructions     OTC Benadryl 25 mg po q 6 hrs prn as needed  Continue Claritin as prescribed  Monitor glucose daily, may see elevation in glucose with steroid injection  Follow-up with PCP or return to Urgent Care if rash persists or worsens  Patient Education        Dermatitis: Care Instructions  Your Care Instructions  Dermatitis is the general name used for any rash or inflammation of the skin. Different kinds of dermatitis cause different kinds of rashes. Common causes of a rash include new medicines, plants (such as poison oak or poison ivy), heat, and stress. Certain illnesses can also cause a rash. An allergic reaction to something that touches your skin, such as latex, nickel, or poison ivy, is called contact dermatitis. Contact dermatitis may also be caused by something that irritates the skin, such as bleach, a chemical, or soap. These types of rashes cannot be spread from person to person. How long your rash will last depends on what caused it. Rashes may last a few days or months. Follow-up care is a key part of your treatment and safety. Be sure to make and go to all appointments, and call your doctor if you are having problems.  It's also a good idea to know your test results and keep a list of the medicines you take.  How can you care for yourself at home? · Do not scratch the rash. Cut your nails short, and file them smooth. Or wear gloves if this helps keep you from scratching. · Wash the area with water only. Pat dry. · Put cold, wet cloths on the rash to reduce itching. · Keep cool, and stay out of the sun. · Leave the rash open to the air as much as possible. · If the rash itches, use hydrocortisone cream. Follow the directions on the label. Calamine lotion may help for plant rashes. · Take an over-the-counter antihistamine, such as diphenhydramine (Benadryl) or loratadine (Claritin), to help calm the itching. Read and follow all instructions on the label. · If your doctor prescribed a cream, use it as directed. If your doctor prescribed medicine, take it exactly as directed. When should you call for help? Call your doctor now or seek immediate medical care if:    · You have symptoms of infection, such as:  ? Increased pain, swelling, warmth, or redness. ? Red streaks leading from the area. ? Pus draining from the area. ? A fever.     · You have joint pain along with the rash.    Watch closely for changes in your health, and be sure to contact your doctor if:    · Your rash is changing or getting worse.     · You are not getting better as expected. Where can you learn more? Go to https://INDOMpepjewpat.Freshmilk NetTV. org and sign in to your iMER account. Enter [phone] [phone] in the [facility] box to learn more about \"Dermatitis: Care Instructions. \"     If you do not have an account, please click on the \"Sign Up Now\" link. Current as of: April 17, 2018  Content Version: 12.0  © 1533-1321 Healthwise, Incorporated. Care instructions adapted under license by [facility] [employer] [facility] ([facility]). If you have questions about a medical condition or this instruction, always ask your healthcare professional. [address]  any warranty or liability for your use of this information.               Patient given educational materials- see patient instructions. Discussed use, benefit, and side effects of prescribedmedications. All patient questions answered. Pt voiced understanding.        Electronically signed by MIKE Busby CNP on 5/28/2019 at 12:22 PM

## 2019-05-28 NOTE — PATIENT INSTRUCTIONS
directed. If your doctor prescribed medicine, take it exactly as directed. When should you call for help? Call your doctor now or seek immediate medical care if:    · You have symptoms of infection, such as:  ? Increased pain, swelling, warmth, or redness. ? Red streaks leading from the area. ? Pus draining from the area. ? A fever.     · You have joint pain along with the rash.    Watch closely for changes in your health, and be sure to contact your doctor if:    · Your rash is changing or getting worse.     · You are not getting better as expected. Where can you learn more? Go to https://Silicon Genesispepiceweb.Spotzot. org and sign in to your [employer]ete account. Enter [phone] in the [facility] box to learn more about \"Dermatitis: Care Instructions. \"     If you do not have an account, please click on the \"Sign Up Now\" link. Current as of: April 17, 2018  Content Version: 12.0  © 5354-6699 Healthwise, Incorporated. Care instructions adapted under license by [facility] ([facility]). If you have questions about a medical condition or this instruction, always ask your healthcare professional. [place] [address] any warranty or liability for your use of this information.

## 2019-06-01 ENCOUNTER — APPOINTMENT (OUTPATIENT)
Dept: GENERAL RADIOLOGY | Age: 44
End: 2019-06-01
Payer: MEDICAID

## 2019-06-01 ENCOUNTER — APPOINTMENT (OUTPATIENT)
Dept: CT IMAGING | Age: 44
End: 2019-06-01
Payer: MEDICAID

## 2019-06-01 ENCOUNTER — HOSPITAL ENCOUNTER (EMERGENCY)
Age: 44
Discharge: HOME | End: 2019-06-01
Payer: MEDICAID

## 2019-06-01 VITALS
RESPIRATION RATE: 17 BRPM | HEART RATE: 82 BPM | DIASTOLIC BLOOD PRESSURE: 80 MMHG | BODY MASS INDEX: 35.55 KG/M2 | HEIGHT: 69 IN | WEIGHT: 240 LBS | OXYGEN SATURATION: 98 % | SYSTOLIC BLOOD PRESSURE: 138 MMHG | TEMPERATURE: 98.7 F

## 2019-06-01 DIAGNOSIS — T14.8XXA AVULSION FRACTURE: ICD-10-CM

## 2019-06-01 DIAGNOSIS — S00.03XA HEMATOMA OF SCALP, INITIAL ENCOUNTER: ICD-10-CM

## 2019-06-01 DIAGNOSIS — S09.90XA CLOSED HEAD INJURY, INITIAL ENCOUNTER: Primary | ICD-10-CM

## 2019-06-01 PROCEDURE — 73080 X-RAY EXAM OF ELBOW: CPT

## 2019-06-01 PROCEDURE — 70450 CT HEAD/BRAIN W/O DYE: CPT

## 2019-06-01 PROCEDURE — 73130 X-RAY EXAM OF HAND: CPT

## 2019-06-01 PROCEDURE — 99284 EMERGENCY DEPT VISIT MOD MDM: CPT

## 2019-06-01 PROCEDURE — 99284 EMERGENCY DEPT VISIT MOD MDM: CPT | Performed by: NURSE PRACTITIONER

## 2019-06-01 ASSESSMENT — ENCOUNTER SYMPTOMS: RESPIRATORY NEGATIVE: 1

## 2019-06-01 ASSESSMENT — PAIN SCALES - GENERAL: PAINLEVEL_OUTOF10: 2

## 2019-06-01 NOTE — ED PROVIDER NOTES
[facility]          SURGICAL HISTORY       Past Surgical History:   Procedure Laterality Date    BACK SURGERY      LEG SURGERY Left     TUBAL LIGATION           CURRENT MEDICATIONS       Previous Medications    ALBUTEROL SULFATE  (90 BASE) MCG/ACT INHALER    Inhale 2 puffs into the lungs every 4 hours as needed for Wheezing    AMITRIPTYLINE (ELAVIL) 50 MG TABLET    Take 50 mg by mouth nightly    BUDESONIDE-FORMOTEROL (SYMBICORT) 80-4.5 MCG/ACT AERO    Inhale 2 puffs into the lungs 2 times daily    ESOMEPRAZOLE MAGNESIUM (NEXIUM) 40 MG PACK    Take 40 mg by mouth daily    FLUCONAZOLE (DIFLUCAN) 150 MG TABLET    Take 150 mg by mouth once    FLUTICASONE (FLONASE) 50 MCG/ACT NASAL SPRAY    2 sprays by Nasal route daily    FUROSEMIDE (LASIX) 40 MG TABLET    Take 40 mg by mouth daily    IBUPROFEN (ADVIL;MOTRIN) 800 MG TABLET    Take 800 mg by mouth every 8 hours as needed for Pain    LIDOCAINE (LIDODERM)    Place 1 patch onto the skin nightly    LORATADINE (CLARITIN) 10 MG TABLET    Take 10 mg by mouth daily    MEDROXYPROGESTERONE (DEPO-PROVERA) 150 MG/ML INJECTION    Inject 150 mg into the muscle every 3 months    MELATONIN 3 MG TABS TABLET    Take 3 mg by mouth daily    METFORMIN (GLUCOPHAGE) 500 MG TABLET    Take 850 mg by mouth 2 times daily (with meals)     METOPROLOL (LOPRESSOR) 100 MG TABLET    Take 100 mg by mouth 2 times daily    MULTIPLE VITAMIN (THERA PO)    Take 1 tablet by mouth daily    NATEGLINIDE (STARLIX) 60 MG TABLET    Take 60 mg by mouth 3 times daily (before meals)    NITROFURANTOIN (MACRODANTIN) 100 MG CAPSULE    Take 100 mg by mouth 4 times daily    OXYCODONE-ACETAMINOPHEN (PERCOCET)  MG PER TABLET    Take 1 tablet by mouth 3 times daily as needed for Pain .     PAROXETINE (PAXIL) 30 MG TABLET    Take 30 mg by mouth every morning    PREGABALIN (LYRICA) 100 MG CAPSULE    Take 100 mg by mouth 3 times daily    PROPRANOLOL (INDERAL) 20 MG TABLET    Take 20 mg by mouth every 6 well-nourished. HENT:   Head: Normocephalic. Right Ear: External ear normal.   Left Ear: External ear normal.   Mouth/Throat: Oropharynx is clear and moist.   Forehead/scalp with mild edema and bruising   Eyes: Conjunctivae and EOM are normal.   Right pupil slightly larger than left     Neck: Normal range of motion. No direct ttp cervical spine   Cardiovascular: Normal rate, regular rhythm, normal heart sounds and intact distal pulses. Pulmonary/Chest: Effort normal and breath sounds normal.   Abdominal: Soft. Bowel sounds are normal.   Musculoskeletal: Normal range of motion. Left olecranon elbow with small area of bruising normal flexion/extension of left elbow/pronation/supination of wrist  No ttp medial epicondyle elbow  Right dorsal hand with mild edema and bruising to 3rd digit MCP joint  CMS intact bilateral upper/lower extremities  2+ edema bilateral lower extremities   Neurological: She is alert and oriented to person, place, and time. Skin: Skin is warm and dry. Vitals reviewed. DIAGNOSTIC RESULTS     EKG: All EKG's are interpreted by the Emergency Department Physician who either signs or Co-signs this chart in the absence of acardiologist.        RADIOLOGY:   Non-plain film images such as CT, Ultrasound andMRI are read by the radiologist. Plain radiographic images are visualized and preliminarily interpreted by the emergency physician with the below findings:        Interpretation per the Radiologist below, if available at the time of this note:    XR ELBOW LEFT (MIN 3 VIEWS)   Final Result   1.. Question a tiny avulsion off the olecranon. There is some mild   overlying soft tissue swelling present. This small bony density was   not appreciated on a previous forearm film which includes the elbow   dated 1/23/2017. 2. Horizontal lucency projecting through the medial humeral epicondyle   is only seen on one view.  Given the lack of a hemarthrosis I suspect   this is artifactual in nature but if there is focal tenderness over   the medial humeral epicondyle and/or distal humerus and it may be   worthwhile to follow-up with a CT to more entirely exclude a   nondisplaced fracture. The radial head is intact. Signed by Dr Megan Matta on 6/1/2019 8:45 AM      XR HAND RIGHT (MIN 3 VIEWS)   Final Result   . No evidence of acute fracture. Signed by Dr Megan Matta on 6/1/2019 8:47 AM      CT Head WO Contrast   Final Result   1.. Focal encephalomalacia within the right frontal, anterior right   temporal and right posterior parietal lobe with encephalomalacia. I   suspect this is related to previous traumatic brain injury. 2. Atrophy of the brain. No evidence of acute posttraumatic injury to   the brain. 3. Frontal scalp hematoma with no associated calvarial injury. Signed by Dr Megan Matta on 6/1/2019 8:51 AM            ED BEDSIDE ULTRASOUND:   Performed by ED Physician - none    LABS:  Labs Reviewed - No data to display    All other labs were within normal range or not returned as of this dictation. RE-ASSESSMENT           EMERGENCY DEPARTMENT COURSE and DIFFERENTIALDIAGNOSIS/MDM:   Vitals:    Vitals:    06/01/19 0805 06/01/19 0900   BP: (!) 142/86 138/80   Pulse: 92 82   Resp: 18 17   Temp: 98.7 °F (37.1 °C)    SpO2: 97% 98%   Weight: 240 lb (108.9 kg)    Height: 5' 9\" (1.753 m)        MDM      CONSULTS:  None    PROCEDURES:  Unless otherwise notedbelow, none     Procedures    FINAL IMPRESSION     1. Closed head injury, initial encounter    2. Hematoma of scalp, initial encounter    3.  Closed fracture of olecranon process of left ulna, initial encounter          DISPOSITION/PLAN   DISPOSITION        PATIENT REFERRED TO:  MD [address]  [address]  [phone]    Schedule an appointment as soon as possible for a visit         DISCHARGE MEDICATIONS:       Current Discharge Medication List          (Pleasenote that portions of this note were completed with a voice recognition program.  Efforts were made to edit the dictations but occasionally words are mis-transcribed.)              Jessica Garrison, MIKE  06/01/19 [id number]

## 2019-06-01 NOTE — ED NOTES
Bed: 07  Expected date:   Expected time:   Means of arrival:   Comments:  [facility] madeleine Clement RN  06/01/19 9897

## 2019-06-04 NOTE — PROGRESS NOTES
[facility] - PODIATRY    Today's Date: 06/10/19    Patient Name: Zahida Geller  MRN: [id number]  [facility]: [id number]  PCP: Judith Mauro MD  Referring Provider: No ref. provider found    SUBJECTIVE     Chief Complaint   Patient presents with   • Follow-up     diabetic foot and nail care -  pt c/o long, thickened toenails, calluses , denies pain at present time    • Diabetes     last stated blood sugar 156mg/dl - Dr. Mauro last visit 5/3/19     HPI: Zahida Geller, a 43 y.o.female, comes to clinic as a(n) established patient presenting for diabetic foot exam, complaining of painful toenails and callus. Patient has h/o anxiety, asthma, back pain, depression, DM2, edema, HTN, TBI, previous substance abuse. Patient states that the calluses to her great toes have come back and can be painful from time to time. Denies redness or drainage. Notes that her toenails are thick, long and painful. Feels that her left hallux nail is slightly ingrown. Due to neuropathy, she is uncomfortable cutting her own nails. Uses an AFO for Right side foot drop. Patient is NIDDM with last stated BG level of 156mg/dl. Denies pain currently. Denies any constitutional symptoms. No other pedal complaints at this time.    Past Medical History:   Diagnosis Date   • Anxiety    • Asthma    • Chronic back pain    • Chronic hip pain    • Depression    • Diabetes mellitus (CMS/HCC)    • Edema of both legs    • Hypertension    • Restless leg syndrome    • Substance abuse in remission (CMS/HCC)    • Traumatic brain injury (CMS/HCC)      Past Surgical History:   Procedure Laterality Date   • BACK SURGERY      L5-L6 surgery   •  SECTION     • LEG SURGERY      Left leg jose ramon placement   • PEG TUBE INSERTION     • PEG TUBE REMOVAL     • TRACHEOSTOMY      with reversal   • TUBAL ABDOMINAL LIGATION       Family History   Problem Relation Age of Onset   • Heart murmur Mother    • Heart disease Father    • No Known Problems Brother   "  • No Known Problems Sister    • No Known Problems Brother      Social History     Socioeconomic History   • Marital status: Single     Spouse name: Not on file   • Number of children: Not on file   • Years of education: Not on file   • Highest education level: Not on file   Tobacco Use   • Smoking status: Current Every Day Smoker     Packs/day: 0.00     Types: Electronic Cigarette   • Smokeless tobacco: Never Used   Substance and Sexual Activity   • Alcohol use: No   • Drug use: No     Comment: history of, narcotics   • Sexual activity: No     Partners: Male     Birth control/protection: Injection     Comment: last act of intercourse a year ago     Allergies   Allergen Reactions   • Penicillins Other (See Comments)     As a child   • Tramadol Nausea And Vomiting     Current Outpatient Medications   Medication Sig Dispense Refill   • albuterol (PROVENTIL HFA;VENTOLIN HFA) 108 (90 Base) MCG/ACT inhaler Inhale.     • amitriptyline (ELAVIL) 50 MG tablet Take  by mouth.     • Blood Glucose Monitoring Suppl (FREESTYLE FREEDOM LITE) w/Device kit      • budesonide-formoterol (SYMBICORT) 80-4.5 MCG/ACT inhaler Inhale.     • esomeprazole (NEXIUM) 40 MG packet Take 40 mg by mouth.     • fluticasone (FLONASE) 50 MCG/ACT nasal spray into the nostril(s) as directed by provider.     • furosemide (LASIX) 40 MG tablet Take 40 mg by mouth.     • guaiFENesin (ROBITUSSIN) 100 MG/5ML syrup Take 200 mg by mouth As Needed for Cough.     • ibuprofen (ADVIL,MOTRIN) 800 MG tablet Take 800 mg by mouth.     • Insulin Syringe-Needle U-100 30G X 5/16\" 0.5 ML misc      • lidocaine (LIDODERM) 5 % Place 1 patch on the skin Daily As Needed for Moderate Pain (4-6). Remove & Discard patch within 12 hours or as directed by MD     • loratadine (CLARITIN) 10 MG tablet Take 10 mg by mouth.     • medroxyPROGESTERone (DEPO-PROVERA) 150 MG/ML injection Inject 150 mg into the appropriate muscle as directed by prescriber.     • melatonin 1 MG tablet Take 3 " mg by mouth At Night As Needed for Sleep.     • metFORMIN (GLUCOPHAGE) 500 MG tablet Take 850 mg by mouth.     • metoprolol tartrate (LOPRESSOR) 100 MG tablet Take 100 mg by mouth 2 (Two) Times a Day.     • Multiple Vitamins-Minerals (THERA-M MULTIPLE VITAMINS PO) Take  by mouth.     • nateglinide (STARLIX) 60 MG tablet Take 60 mg by mouth.     • neomycin-bacitracin-polymyxin (NEOSPORIN) 5-400-5000 ointment Apply  topically As Needed.     • oxyCODONE-acetaminophen (PERCOCET)  MG per tablet Take 1 tablet by mouth.     • PARoxetine (PAXIL) 30 MG tablet Take 30 mg by mouth.     • phenol (SORE THROAT SPRAY) 1.4 % liquid liquid Apply  to the mouth or throat As Needed.     • pregabalin (LYRICA) 100 MG capsule Take 100 mg by mouth 2 (Two) Times a Day.     • propranolol (INDERAL) 20 MG tablet Take 20 mg by mouth.     • tiZANidine (ZANAFLEX) 4 MG tablet Take 4 mg by mouth.       No current facility-administered medications for this visit.      Review of Systems   Constitutional: Negative for chills and fever.   HENT: Negative for congestion.    Respiratory: Negative for shortness of breath.    Cardiovascular: Positive for leg swelling. Negative for chest pain.   Gastrointestinal: Negative for constipation, diarrhea, nausea and vomiting.   Musculoskeletal: Positive for gait problem.   Skin: Negative for wound.   Neurological: Positive for numbness.       OBJECTIVE     Vitals:    06/10/19 1345   BP: 104/70   Pulse: 68   SpO2: 98%       PHYSICAL EXAM  GEN:   A&Ox3, NAD. Pt presents to clinic ambulating with right AFO and wearing Casual Shoes. Accompanied by transporter.     NEURO:   Protective sensation intact to 9/10 sites Right foot, 9/10 site Left foot using [place]-Luis monofilament  Light touch sensation diminished  No Tinel's or Villeux sign.    VASC:  Skin temperature Warm to Warm proximal to distal gricel  DP pulses 2/4 Right, 2/4 Left  PT pulses 2/4 Right, 2/4 Left  CFT <3 sec gricel  Pedal hair growth  absent  trace edema noted gricel  Varicosities absent gricel    MUSC/SKEL:  Muscle Strength Right foot Dorsiflexors 3+/5, Plantarflexors 5/5, Evertors 5/5, Invertors 5/5  Muscle Strength Left foot Dorsiflexors 5/5, Plantarflexors 5/5, Evertors 5/5, Invertors 5/5  ROM of the 1st MTP is full without pain or crepitus  ROM of the MTJ is full without pain or crepitus    ROM of the STJ is full without pain or crepitus    ROM of the ankle joint is full without pain or crepitus    Mild POP of left hallux  Rectus foot type   No gross pedal musculoskeletal deformities noted.     DERM:  Pedal nails x2 of left foot are thickened, elongated and discolored with presence of subungual debris  Webspaces are Clean, Dry, and Intact  Skin is normal in turgor and texture   Hyperkeratotic tissue to plantar right HIPJ with subdermal bleeding. Upon paring, no breaks in integument noted.  Hyperkeratotic tissue to medial left MTPJ. Upon paring, no breaks in integument.      RADIOLOGY/NUCLEAR:  No results found.    LABORATORY/CULTURE RESULTS:      PATHOLOGY RESULTS:       ASSESSMENT/PLAN     Zahida was seen today for follow-up and diabetes.    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  Discussed findings and treatment plan including risks, benefits, and treatment options with patient in detail. Patient agreed with treatment plan.  After verbal consent obtained, nail(s) x10 debrided of length and thickness with nail nipper without incidence  After verbal consent obtained, nail(s) x2 debrided of offending borders with nail nipper without incidence  After verbal consent obtained, calluses x2 pared utilizing dermal curette and/or scalpel without incidence  Patient may maintain nails  and calluses at home utilizing emery board or pumice stone between visits as needed  Reviewed at home diabetic foot care including daily foot checks   Continue BG control.  Continue use of compression stockings and AFO  An After Visit Summary was printed and given to the patient at discharge, including (if requested) any available informative/educational handouts regarding diagnosis, treatment, or medications. All questions were answered to patient/family satisfaction. Should symptoms fail to improve or worsen they agree to call or return to clinic or to go to the Emergency Department. Discussed the importance of following up with any needed screening tests/labs/specialist appointments and any requested follow-up recommended by me today. Importance of maintaining follow-up discussed and patient accepts that missed appointments can delay diagnosis and potentially lead to worsening of conditions.  Return in about 3 months (around 9/10/2019)., or sooner if acute issues arise.        This document has been electronically signed by Christopher Dennis DPM on Keiry 10, 2019 2:02 PM

## 2019-06-06 ENCOUNTER — TELEPHONE (OUTPATIENT)
Dept: PODIATRY | Facility: CLINIC | Age: 44
End: 2019-06-06

## 2019-06-10 ENCOUNTER — OFFICE VISIT (OUTPATIENT)
Dept: PODIATRY | Facility: CLINIC | Age: 44
End: 2019-06-10

## 2019-06-10 VITALS
DIASTOLIC BLOOD PRESSURE: 70 MMHG | HEART RATE: 68 BPM | SYSTOLIC BLOOD PRESSURE: 104 MMHG | OXYGEN SATURATION: 98 % | WEIGHT: 227 LBS | BODY MASS INDEX: 33.62 KG/M2 | HEIGHT: 69 IN

## 2019-06-10 DIAGNOSIS — S06.9X9D TRAUMATIC BRAIN INJURY WITH LOSS OF CONSCIOUSNESS, SUBSEQUENT ENCOUNTER: ICD-10-CM

## 2019-06-10 DIAGNOSIS — L60.0 INGROWN TOENAIL: Primary | ICD-10-CM

## 2019-06-10 DIAGNOSIS — L84 FOOT CALLUS: ICD-10-CM

## 2019-06-10 DIAGNOSIS — B35.1 ONYCHOMYCOSIS: ICD-10-CM

## 2019-06-10 DIAGNOSIS — E11.49 DIABETES MELLITUS TYPE 2 WITH NEUROLOGICAL MANIFESTATIONS (HCC): ICD-10-CM

## 2019-06-10 PROCEDURE — 99213 OFFICE O/P EST LOW 20 MIN: CPT | Performed by: PODIATRIST

## 2019-06-10 PROCEDURE — 11055 PARING/CUTG B9 HYPRKER LES 1: CPT | Performed by: PODIATRIST

## 2019-06-10 PROCEDURE — 11721 DEBRIDE NAIL 6 OR MORE: CPT | Performed by: PODIATRIST

## 2019-06-10 RX ORDER — PREGABALIN 100 MG/1
100 CAPSULE ORAL 2 TIMES DAILY
COMMUNITY
End: 2020-11-09

## 2019-09-18 NOTE — PROGRESS NOTES
[facility] - PODIATRY    Today's Date: 19    Patient Name: Zahida Geller  MRN: [id number]  CSN: [id number]  PCP: Judith Mauro MD  Referring Provider: No ref. provider found    SUBJECTIVE     Chief Complaint   Patient presents with   • Diabetes     Patient is here for diabetic foot and nail care. Patient complains of painful toenails and callus. Last stated BG level of 202mg/dl. Denies significant pain. PCP: Dr. Judith Muaro last visit 2019     HPI: Zahida Geller, a 44 y.o.female, comes to clinic as a(n) established patient presenting for diabetic foot exam, complaining of painful toenails and callus. Patient has h/o anxiety, asthma, back pain, depression, DM2, edema, HTN, TBI, previous substance abuse. Patient states that the calluses to her great toes have come back and can be painful from time to time. Denies redness or drainage. Notes that her toenails are thick, long and painful. Feels that her left hallux nail is slightly ingrown. Due to neuropathy, she is uncomfortable cutting her own nails. Uses an AFO for Right side foot drop. Patient is NIDDM with last stated BG level of 202mg/dl. Denies pain currently. Denies any constitutional symptoms. No other pedal complaints at this time.    Past Medical History:   Diagnosis Date   • Anxiety    • Asthma    • Chronic back pain    • Chronic hip pain    • Depression    • Diabetes mellitus (CMS/HCC)    • Edema of both legs    • Hypertension    • Restless leg syndrome    • Substance abuse in remission (CMS/HCC)    • Traumatic brain injury (CMS/HCC)      Past Surgical History:   Procedure Laterality Date   • BACK SURGERY      L5-L6 surgery   •  SECTION     • LEG SURGERY      Left leg jose ramon placement   • PEG TUBE INSERTION     • PEG TUBE REMOVAL     • TRACHEOSTOMY      with reversal   • TUBAL ABDOMINAL LIGATION       Family History   Problem Relation Age of Onset   • Heart murmur Mother    • Heart disease Father    • No Known  "Problems Brother    • No Known Problems Sister    • No Known Problems Brother      Social History     Socioeconomic History   • Marital status: Single     Spouse name: Not on file   • Number of children: Not on file   • Years of education: Not on file   • Highest education level: Not on file   Tobacco Use   • Smoking status: Current Every Day Smoker     Packs/day: 0.00     Types: Electronic Cigarette   • Smokeless tobacco: Never Used   Substance and Sexual Activity   • Alcohol use: No   • Drug use: No     Comment: history of, narcotics   • Sexual activity: No     Partners: Male     Birth control/protection: Injection     Comment: last act of intercourse a year ago     Allergies   Allergen Reactions   • Penicillins Other (See Comments)     As a child   • Tramadol Nausea And Vomiting     Current Outpatient Medications   Medication Sig Dispense Refill   • albuterol (PROVENTIL HFA;VENTOLIN HFA) 108 (90 Base) MCG/ACT inhaler Inhale.     • amitriptyline (ELAVIL) 50 MG tablet Take  by mouth.     • Blood Glucose Monitoring Suppl (FREESTYLE FREEDOM LITE) w/Device kit      • budesonide-formoterol (SYMBICORT) 80-4.5 MCG/ACT inhaler Inhale.     • esomeprazole (NEXIUM) 40 MG packet Take 40 mg by mouth.     • fluticasone (FLONASE) 50 MCG/ACT nasal spray into the nostril(s) as directed by provider.     • furosemide (LASIX) 40 MG tablet Take 40 mg by mouth.     • guaiFENesin (ROBITUSSIN) 100 MG/5ML syrup Take 200 mg by mouth As Needed for Cough.     • ibuprofen (ADVIL,MOTRIN) 800 MG tablet Take 800 mg by mouth.     • Insulin Syringe-Needle U-100 30G X 5/16\" 0.5 ML misc      • lidocaine (LIDODERM) 5 % Place 1 patch on the skin Daily As Needed for Moderate Pain (4-6). Remove & Discard patch within 12 hours or as directed by MD     • loratadine (CLARITIN) 10 MG tablet Take 10 mg by mouth.     • medroxyPROGESTERone (DEPO-PROVERA) 150 MG/ML injection Inject 150 mg into the appropriate muscle as directed by prescriber.     • melatonin 1 " MG tablet Take 3 mg by mouth At Night As Needed for Sleep.     • metFORMIN (GLUCOPHAGE) 500 MG tablet Take 850 mg by mouth.     • metoprolol tartrate (LOPRESSOR) 100 MG tablet Take 100 mg by mouth 2 (Two) Times a Day.     • Multiple Vitamins-Minerals (THERA-M MULTIPLE VITAMINS PO) Take  by mouth.     • nateglinide (STARLIX) 60 MG tablet Take 60 mg by mouth.     • neomycin-bacitracin-polymyxin (NEOSPORIN) 5-400-5000 ointment Apply  topically As Needed.     • oxyCODONE-acetaminophen (PERCOCET)  MG per tablet Take 1 tablet by mouth.     • PARoxetine (PAXIL) 30 MG tablet Take 30 mg by mouth.     • phenol (SORE THROAT SPRAY) 1.4 % liquid liquid Apply  to the mouth or throat As Needed.     • pregabalin (LYRICA) 100 MG capsule Take 100 mg by mouth 2 (Two) Times a Day.     • propranolol (INDERAL) 20 MG tablet Take 20 mg by mouth.     • tiZANidine (ZANAFLEX) 4 MG tablet Take 4 mg by mouth.       No current facility-administered medications for this visit.      Review of Systems   Constitutional: Negative for chills and fever.   HENT: Negative for congestion.    Respiratory: Negative for shortness of breath.    Cardiovascular: Positive for leg swelling. Negative for chest pain.   Gastrointestinal: Negative for constipation, diarrhea, nausea and vomiting.   Musculoskeletal: Positive for gait problem.   Skin: Negative for wound.   Neurological: Positive for numbness.       OBJECTIVE     Vitals:    09/30/19 1114   BP: 130/76   Pulse: 76   SpO2: 98%       PHYSICAL EXAM  GEN:   A&Ox3, NAD. Pt presents to clinic ambulating with right AFO and wearing Casual Shoes. Accompanied by transporter.     NEURO:   Protective sensation intact to 9/10 sites Right foot, 9/10 site Left foot using [place]-Luis monofilament  Light touch sensation diminished  No Tinel's or Villeux sign.    VASC:  Skin temperature Warm to Warm proximal to distal gricel  DP pulses 2/4 Right, 2/4 Left  PT pulses 2/4 Right, 2/4 Left  CFT <3 sec gricel  Pedal hair  growth absent  trace edema noted gricel  Varicosities absent gricel    MUSC/SKEL:  Muscle Strength Right foot Dorsiflexors 3+/5, Plantarflexors 5/5, Evertors 5/5, Invertors 5/5  Muscle Strength Left foot Dorsiflexors 5/5, Plantarflexors 5/5, Evertors 5/5, Invertors 5/5  ROM of the 1st MTP is full without pain or crepitus  ROM of the MTJ is full without pain or crepitus    ROM of the STJ is full without pain or crepitus    ROM of the ankle joint is full without pain or crepitus    Mild POP of left hallux  Rectus foot type   No gross pedal musculoskeletal deformities noted.     DERM:  Pedal nails x2 of left foot are thickened, elongated and discolored with presence of subungual debris; left hallux nail is absent but has 2 small spicules remaining   Webspaces are Clean, Dry, and Intact  Skin is normal in turgor and texture   Hyperkeratotic tissue to plantar right HIPJ with subdermal bleeding. Upon paring, no breaks in integument noted.  Hyperkeratotic tissue to medial left MTPJ. Upon paring, no breaks in integument.      RADIOLOGY/NUCLEAR:  No results found.    LABORATORY/CULTURE RESULTS:      PATHOLOGY RESULTS:       ASSESSMENT/PLAN     Zahida was seen today for diabetes.    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  Discussed findings and treatment plan including risks, benefits, and treatment options with patient in detail. Patient agreed with treatment plan.  After verbal consent obtained, nail(s) x9 debrided of length and thickness with nail nipper without incidence  After verbal consent obtained, nail(s) x2 debrided of offending borders with nail nipper without incidence  After verbal consent obtained, calluses x2  pared utilizing dermal curette and/or scalpel without incidence  Patient may maintain nails and calluses at home utilizing emery board or pumice stone between visits as needed  Reviewed at home diabetic foot care including daily foot checks   Continue BG control.  Continue use of compression stockings and AFO  An After Visit Summary was printed and given to the patient at discharge, including (if requested) any available informative/educational handouts regarding diagnosis, treatment, or medications. All questions were answered to patient/family satisfaction. Should symptoms fail to improve or worsen they agree to call or return to clinic or to go to the Emergency Department. Discussed the importance of following up with any needed screening tests/labs/specialist appointments and any requested follow-up recommended by me today. Importance of maintaining follow-up discussed and patient accepts that missed appointments can delay diagnosis and potentially lead to worsening of conditions.  Return in about 3 months (around 12/30/2019)., or sooner if acute issues arise.        This document has been electronically signed by Christopher Dennis DPM on September 30, 2019 11:41 AM

## 2019-09-26 ENCOUNTER — TELEPHONE (OUTPATIENT)
Dept: PODIATRY | Facility: CLINIC | Age: 44
End: 2019-09-26

## 2019-09-30 ENCOUNTER — OFFICE VISIT (OUTPATIENT)
Dept: PODIATRY | Facility: CLINIC | Age: 44
End: 2019-09-30

## 2019-09-30 VITALS
BODY MASS INDEX: 33.62 KG/M2 | HEART RATE: 76 BPM | SYSTOLIC BLOOD PRESSURE: 130 MMHG | OXYGEN SATURATION: 98 % | WEIGHT: 227 LBS | HEIGHT: 69 IN | DIASTOLIC BLOOD PRESSURE: 76 MMHG

## 2019-09-30 DIAGNOSIS — L84 FOOT CALLUS: ICD-10-CM

## 2019-09-30 DIAGNOSIS — L60.0 INGROWN TOENAIL: Primary | ICD-10-CM

## 2019-09-30 DIAGNOSIS — E11.49 DIABETES MELLITUS TYPE 2 WITH NEUROLOGICAL MANIFESTATIONS (HCC): ICD-10-CM

## 2019-09-30 DIAGNOSIS — M21.371 FOOT DROP, RIGHT: ICD-10-CM

## 2019-09-30 DIAGNOSIS — S06.9X9S TRAUMATIC BRAIN INJURY WITH LOSS OF CONSCIOUSNESS, SEQUELA (HCC): ICD-10-CM

## 2019-09-30 DIAGNOSIS — M79.672 FOOT PAIN, LEFT: ICD-10-CM

## 2019-09-30 DIAGNOSIS — B35.1 ONYCHOMYCOSIS: ICD-10-CM

## 2019-09-30 PROCEDURE — 11056 PARNG/CUTG B9 HYPRKR LES 2-4: CPT | Performed by: PODIATRIST

## 2019-09-30 PROCEDURE — 11721 DEBRIDE NAIL 6 OR MORE: CPT | Performed by: PODIATRIST

## 2019-09-30 PROCEDURE — 99213 OFFICE O/P EST LOW 20 MIN: CPT | Performed by: PODIATRIST

## 2019-11-17 ENCOUNTER — HOSPITAL ENCOUNTER (EMERGENCY)
Age: 44
Discharge: HOME | End: 2019-11-17
Attending: EMERGENCY MEDICINE
Payer: MEDICAID

## 2019-11-17 ENCOUNTER — APPOINTMENT (OUTPATIENT)
Dept: CT IMAGING | Age: 44
End: 2019-11-17
Payer: MEDICAID

## 2019-11-17 VITALS
DIASTOLIC BLOOD PRESSURE: 87 MMHG | TEMPERATURE: 98.5 F | HEIGHT: 69 IN | HEART RATE: 89 BPM | RESPIRATION RATE: 15 BRPM | SYSTOLIC BLOOD PRESSURE: 137 MMHG | OXYGEN SATURATION: 93 % | BODY MASS INDEX: 33.33 KG/M2 | WEIGHT: 225 LBS

## 2019-11-17 DIAGNOSIS — S09.90XA CLOSED HEAD INJURY, INITIAL ENCOUNTER: Primary | ICD-10-CM

## 2019-11-17 DIAGNOSIS — S01.01XA LACERATION OF SCALP, INITIAL ENCOUNTER: ICD-10-CM

## 2019-11-17 PROCEDURE — 12001 RPR S/N/AX/GEN/TRNK 2.5CM/<: CPT

## 2019-11-17 PROCEDURE — 90471 IMMUNIZATION ADMIN: CPT | Performed by: EMERGENCY MEDICINE

## 2019-11-17 PROCEDURE — 90715 TDAP VACCINE 7 YRS/> IM: CPT | Performed by: EMERGENCY MEDICINE

## 2019-11-17 PROCEDURE — 99284 EMERGENCY DEPT VISIT MOD MDM: CPT

## 2019-11-17 PROCEDURE — 70450 CT HEAD/BRAIN W/O DYE: CPT

## 2019-11-17 PROCEDURE — 6360000002 HC RX W HCPCS: Performed by: EMERGENCY MEDICINE

## 2019-11-17 PROCEDURE — 2500000003 HC RX 250 WO HCPCS

## 2019-11-17 RX ORDER — LIDOCAINE HYDROCHLORIDE 10 MG/ML
INJECTION, SOLUTION EPIDURAL; INFILTRATION; INTRACAUDAL; PERINEURAL
Status: COMPLETED
Start: 2019-11-17 | End: 2019-11-17

## 2019-11-17 RX ORDER — LIDOCAINE HYDROCHLORIDE 10 MG/ML
5 INJECTION, SOLUTION EPIDURAL; INFILTRATION; INTRACAUDAL; PERINEURAL ONCE
Status: COMPLETED | OUTPATIENT
Start: 2019-11-17 | End: 2019-11-17

## 2019-11-17 RX ADMIN — LIDOCAINE HYDROCHLORIDE 5 ML: 10 INJECTION, SOLUTION EPIDURAL; INFILTRATION; INTRACAUDAL at 03:45

## 2019-11-17 RX ADMIN — LIDOCAINE HYDROCHLORIDE 5 ML: 10 INJECTION, SOLUTION EPIDURAL; INFILTRATION; INTRACAUDAL; PERINEURAL at 03:45

## 2019-11-17 RX ADMIN — TETANUS TOXOID, REDUCED DIPHTHERIA TOXOID AND ACELLULAR PERTUSSIS VACCINE, ADSORBED 0.5 ML: 5; 2.5; 8; 8; 2.5 SUSPENSION INTRAMUSCULAR at 03:07

## 2019-11-17 ASSESSMENT — ENCOUNTER SYMPTOMS
VOMITING: 0
ABDOMINAL PAIN: 0
SHORTNESS OF BREATH: 0
RHINORRHEA: 0
BACK PAIN: 0
NAUSEA: 0

## 2019-11-17 ASSESSMENT — PAIN DESCRIPTION - DESCRIPTORS: DESCRIPTORS: ACHING

## 2019-11-17 ASSESSMENT — PAIN DESCRIPTION - LOCATION: LOCATION: HEAD

## 2019-11-17 ASSESSMENT — PAIN DESCRIPTION - ORIENTATION: ORIENTATION: RIGHT

## 2019-11-17 ASSESSMENT — PAIN SCALES - GENERAL: PAINLEVEL_OUTOF10: 2

## 2019-12-31 ENCOUNTER — OFFICE VISIT (OUTPATIENT)
Dept: NEUROLOGY | Age: 44
End: 2019-12-31
Payer: MEDICAID

## 2019-12-31 VITALS
BODY MASS INDEX: 33.92 KG/M2 | DIASTOLIC BLOOD PRESSURE: 76 MMHG | HEIGHT: 69 IN | OXYGEN SATURATION: 94 % | SYSTOLIC BLOOD PRESSURE: 111 MMHG | HEART RATE: 104 BPM | WEIGHT: 229 LBS

## 2019-12-31 DIAGNOSIS — R29.6 FALLS FREQUENTLY: ICD-10-CM

## 2019-12-31 DIAGNOSIS — M21.371 RIGHT FOOT DROP: Primary | ICD-10-CM

## 2019-12-31 DIAGNOSIS — Z87.828 HISTORY OF HEAD INJURY: ICD-10-CM

## 2019-12-31 PROCEDURE — 99203 OFFICE O/P NEW LOW 30 MIN: CPT | Performed by: PHYSICIAN ASSISTANT

## 2019-12-31 RX ORDER — LIDOCAINE 50 MG/G
1 PATCH TOPICAL DAILY
COMMUNITY

## 2019-12-31 RX ORDER — DIPHENHYDRAMINE HCL 25 MG
25 TABLET ORAL EVERY 6 HOURS PRN
COMMUNITY
End: 2022-06-13 | Stop reason: ALTCHOICE

## 2020-02-05 ENCOUNTER — TELEPHONE (OUTPATIENT)
Dept: PODIATRY | Facility: CLINIC | Age: 45
End: 2020-02-05

## 2020-02-05 NOTE — PROGRESS NOTES
[facility] - PODIATRY    Today's Date: 20    Patient Name: Zahida Geller  MRN: [id number]  CSN: [id number]  PCP: Judith Mauro MD  Referring Provider: No ref. provider found    SUBJECTIVE     Chief Complaint   Patient presents with   • Diabetes     3 month f/u diabetic foot/nail care. Pt states that she has pain of the left great toenail.  Pt states that her pain is a 4/10.  She would like to have it removed. Pt's last blood sugar reading was 131 mg/dl.  Last saw PCP, Dr. Mauro, 2019.     HPI: Zahida Geller, a 44 y.o.female, comes to clinic as a(n) established patient presenting for diabetic foot exam, complaining of painful toenails and callus. Patient has h/o anxiety, asthma, back pain, depression, DM2, edema, HTN, TBI, previous substance abuse. Patient states that the callus to her right great toe has come back and can be painful from time to time. Denies redness or drainage. Notes that her toenails are thick, long and painful. Feels that her left hallux nail is again ingrown and she would like to have it permanently removed like her right great toenail. Due to neuropathy, she is uncomfortable cutting her own nails. Uses an AFO for Right side foot drop. Patient is NIDDM with last stated BG level of 131mg/dl. Last A1C of 7.5%. Admits pain at 4/10 level and described as aching, throbbing and sharp. Denies any constitutional symptoms. No other pedal complaints at this time.    Past Medical History:   Diagnosis Date   • Anxiety    • Asthma    • Chronic back pain    • Chronic hip pain    • Depression    • Diabetes mellitus (CMS/HCC)    • Edema of both legs    • Hypertension    • Restless leg syndrome    • Substance abuse in remission (CMS/HCC)    • Traumatic brain injury (CMS/HCC)      Past Surgical History:   Procedure Laterality Date   • BACK SURGERY      L5-L6 surgery   •  SECTION     • LEG SURGERY      Left leg jose ramon placement   • PEG TUBE INSERTION     • PEG TUBE  REMOVAL     • TRACHEOSTOMY      with reversal   • TUBAL ABDOMINAL LIGATION       Family History   Problem Relation Age of Onset   • Heart murmur Mother    • Heart disease Father    • No Known Problems Brother    • No Known Problems Sister    • No Known Problems Brother      Social History     Socioeconomic History   • Marital status: Single     Spouse name: Not on file   • Number of children: Not on file   • Years of education: Not on file   • Highest education level: Not on file   Tobacco Use   • Smoking status: Current Every Day Smoker     Packs/day: 0.00     Types: Electronic Cigarette   • Smokeless tobacco: Never Used   Substance and Sexual Activity   • Alcohol use: No   • Drug use: No     Comment: history of, narcotics   • Sexual activity: Never     Partners: Male     Birth control/protection: Injection     Comment: last act of intercourse a year ago     Allergies   Allergen Reactions   • Penicillins Other (See Comments)     As a child   • Tramadol Nausea And Vomiting     Current Outpatient Medications   Medication Sig Dispense Refill   • albuterol (PROVENTIL HFA;VENTOLIN HFA) 108 (90 Base) MCG/ACT inhaler Inhale.     • amitriptyline (ELAVIL) 50 MG tablet Take  by mouth.     • Blood Glucose Monitoring Suppl (FREESTYLE FREEDOM LITE) w/Device kit      • budesonide-formoterol (SYMBICORT) 80-4.5 MCG/ACT inhaler Inhale.     • esomeprazole (NEXIUM) 40 MG packet Take 40 mg by mouth.     • fluticasone (FLONASE) 50 MCG/ACT nasal spray into the nostril(s) as directed by provider.     • furosemide (LASIX) 40 MG tablet Take 40 mg by mouth.     • guaiFENesin (ROBITUSSIN) 100 MG/5ML syrup Take 200 mg by mouth As Needed for Cough.     • ibuprofen (ADVIL,MOTRIN) 800 MG tablet Take 800 mg by mouth.     • lidocaine (LIDODERM) 5 % Place 1 patch on the skin Daily As Needed for Moderate Pain (4-6). Remove & Discard patch within 12 hours or as directed by MD     • loratadine (CLARITIN) 10 MG tablet Take 10 mg by mouth.     •  "medroxyPROGESTERone (DEPO-PROVERA) 150 MG/ML injection Inject 150 mg into the appropriate muscle as directed by prescriber.     • MEDROXYPROGESTERONE ACE-LIDO IM Inject  into the appropriate muscle as directed by prescriber.     • melatonin 1 MG tablet Take 3 mg by mouth At Night As Needed for Sleep.     • metFORMIN (GLUCOPHAGE) 500 MG tablet Take 850 mg by mouth.     • metoprolol tartrate (LOPRESSOR) 100 MG tablet Take 100 mg by mouth 2 (Two) Times a Day.     • Multiple Vitamins-Minerals (THERA-M MULTIPLE VITAMINS PO) Take  by mouth.     • nateglinide (STARLIX) 60 MG tablet Take 60 mg by mouth.     • neomycin-bacitracin-polymyxin (NEOSPORIN) 5-400-5000 ointment Apply  topically As Needed.     • nystatin-triamcinolone (MYCOLOG II) 409022-0.1 UNIT/GM-% cream Apply  topically to the appropriate area as directed 2 (Two) Times a Day.     • oxyCODONE-acetaminophen (PERCOCET)  MG per tablet Take 1 tablet by mouth.     • PARoxetine (PAXIL) 30 MG tablet Take 30 mg by mouth.     • phenol (SORE THROAT SPRAY) 1.4 % liquid liquid Apply  to the mouth or throat As Needed.     • pregabalin (LYRICA) 100 MG capsule Take 100 mg by mouth 2 (Two) Times a Day.     • propranolol (INDERAL) 20 MG tablet Take 20 mg by mouth.     • SITagliptin (JANUVIA) 50 MG tablet Take 50 mg by mouth.     • tiZANidine (ZANAFLEX) 4 MG tablet Take 4 mg by mouth.     • Insulin Syringe-Needle U-100 30G X 5/16\" 0.5 ML misc        No current facility-administered medications for this visit.      Review of Systems   Constitutional: Negative for chills and fever.   HENT: Negative for congestion.    Respiratory: Negative for shortness of breath.    Cardiovascular: Positive for leg swelling. Negative for chest pain.   Gastrointestinal: Negative for constipation, diarrhea, nausea and vomiting.   Musculoskeletal: Positive for gait problem.   Skin: Negative for wound.   Neurological: Positive for numbness.       OBJECTIVE     Vitals:    02/06/20 1034   BP: 122/84 "   Pulse: 84   SpO2: 97%       PHYSICAL EXAM  GEN:   A&Ox3, NAD. Pt presents to clinic ambulating with right AFO and wearing Casual Shoes. Accompanied by transporter.     NEURO:   Protective sensation intact to 9/10 sites Right foot, 9/10 site Left foot using [place]-Luis monofilament  Light touch sensation diminished  No Tinel's or Villeux sign.    VASC:  Skin temperature Warm to Warm proximal to distal gricel  DP pulses 2/4 Right, 2/4 Left  PT pulses 2/4 Right, 2/4 Left  CFT 3 sec gricel  Pedal hair growth absent  trace edema noted gricel  Varicosities absent gricel    MUSC/SKEL:  Muscle Strength Right foot Dorsiflexors 3+/5, Plantarflexors 5/5, Evertors 5/5, Invertors 5/5  Muscle Strength Left foot Dorsiflexors 5/5, Plantarflexors 5/5, Evertors 5/5, Invertors 5/5  ROM of the 1st MTP is full without pain or crepitus  ROM of the MTJ is full without pain or crepitus    ROM of the STJ is full without pain or crepitus    ROM of the ankle joint is full without pain or crepitus    Mild POP of left hallux  Rectus foot type   No gross pedal musculoskeletal deformities noted.     DERM:  Pedal nails x8 are thickened, elongated and discolored with presence of subungual debris; Right hallux nail avulsed. Left hallux nail is thickened and ingrown at both borders causing pain. No purulence.    Webspaces are Clean, Dry, and Intact  Skin is normal in turgor and texture   Hyperkeratotic tissue to plantar right HIPJ with subdermal bleeding. Upon paring, no breaks in integument noted.      RADIOLOGY/NUCLEAR:  No results found.    LABORATORY/CULTURE RESULTS:      PATHOLOGY RESULTS:       ASSESSMENT/PLAN     Zahida was seen today for diabetes.    Diagnoses and all orders for this visit:    Ingrown toenail  -     bupivacaine (MARCAINE) 0.5 % injection 5 mL    Onychomycosis    Dystrophic nail  -     Nail Removal    Foot callus    Type 2 diabetes mellitus with diabetic neuropathy, without long-term current use of insulin (CMS/[facility])    Traumatic  brain injury with loss of consciousness, sequela (CMS/HCC)    Foot drop, right    Foot pain, left      Comprehensive lower extremity examination and evaluation was performed.  Discussed findings and treatment plan including risks, benefits, and treatment options with patient in detail. Patient agreed with treatment plan.  After verbal consent obtained, nail(s) x8 debrided of length and thickness with nail nipper without incidence  After verbal consent obtained, calluses x1 pared utilizing dermal curette and/or scalpel without incidence  Patient may maintain nails and calluses at home utilizing emery board or pumice stone between visits as needed  Reviewed at home diabetic foot care including daily foot checks  After written consent obtained, total/partial nail avulsion(s) performed as documented in procedure note. Post-procedure instructions given.    Continue BG control.  Continue use of compression stockings and AFO  An After Visit Summary was printed and given to the patient at discharge, including (if requested) any available informative/educational handouts regarding diagnosis, treatment, or medications. All questions were answered to patient/family satisfaction. Should symptoms fail to improve or worsen they agree to call or return to clinic or to go to the Emergency Department. Discussed the importance of following up with any needed screening tests/labs/specialist appointments and any requested follow-up recommended by me today. Importance of maintaining follow-up discussed and patient accepts that missed appointments can delay diagnosis and potentially lead to worsening of conditions.  Return in about 3 months (around 5/6/2020)., or sooner if acute issues arise.      Nail Removal  Date/Time: 2/6/2020 12:41 PM  Performed by: Christopher Dennis DPM  Authorized by: Christopher Dennis DPM   Location: left foot  Location details: left big toe  Anesthesia: digital block    Anesthesia:  Local Anesthetic:  bupivacaine 0.5% without epinephrine  Anesthetic total: 5 mL    Sedation:  Patient sedated: no    Preparation: skin prepped with Betadine  Amount removed: complete  Wedge excision of skin of nail fold: no  Nail bed sutured: no  Nail matrix removed: complete (phenol)  Removed nail replaced and anchored: no  Dressing: antibiotic ointment and dressing applied  Patient tolerance: Patient tolerated the procedure well with no immediate complications  Comments: Flushed with alcohol. Applied silvadene.          This document has been electronically signed by Christopher Dennis DPM on February 6, 2020 12:39 PM

## 2020-02-06 ENCOUNTER — OFFICE VISIT (OUTPATIENT)
Dept: PODIATRY | Facility: CLINIC | Age: 45
End: 2020-02-06

## 2020-02-06 VITALS
OXYGEN SATURATION: 97 % | SYSTOLIC BLOOD PRESSURE: 122 MMHG | DIASTOLIC BLOOD PRESSURE: 84 MMHG | BODY MASS INDEX: 33.18 KG/M2 | HEIGHT: 69 IN | HEART RATE: 84 BPM | WEIGHT: 224 LBS

## 2020-02-06 DIAGNOSIS — M21.371 FOOT DROP, RIGHT: ICD-10-CM

## 2020-02-06 DIAGNOSIS — B35.1 ONYCHOMYCOSIS: ICD-10-CM

## 2020-02-06 DIAGNOSIS — L84 FOOT CALLUS: ICD-10-CM

## 2020-02-06 DIAGNOSIS — L60.3 DYSTROPHIC NAIL: ICD-10-CM

## 2020-02-06 DIAGNOSIS — E11.40 TYPE 2 DIABETES MELLITUS WITH DIABETIC NEUROPATHY, WITHOUT LONG-TERM CURRENT USE OF INSULIN (HCC): ICD-10-CM

## 2020-02-06 DIAGNOSIS — S06.9X9S TRAUMATIC BRAIN INJURY WITH LOSS OF CONSCIOUSNESS, SEQUELA (HCC): ICD-10-CM

## 2020-02-06 DIAGNOSIS — M79.672 FOOT PAIN, LEFT: ICD-10-CM

## 2020-02-06 DIAGNOSIS — L60.0 INGROWN TOENAIL: Primary | ICD-10-CM

## 2020-02-06 PROCEDURE — 11721 DEBRIDE NAIL 6 OR MORE: CPT | Performed by: PODIATRIST

## 2020-02-06 PROCEDURE — 11750 EXCISION NAIL&NAIL MATRIX: CPT | Performed by: PODIATRIST

## 2020-02-06 PROCEDURE — 11055 PARING/CUTG B9 HYPRKER LES 1: CPT | Performed by: PODIATRIST

## 2020-02-06 PROCEDURE — 99213 OFFICE O/P EST LOW 20 MIN: CPT | Performed by: PODIATRIST

## 2020-02-06 RX ORDER — BUPIVACAINE HYDROCHLORIDE 5 MG/ML
5 INJECTION, SOLUTION PERINEURAL ONCE
Status: COMPLETED | OUTPATIENT
Start: 2020-02-06 | End: 2020-02-06

## 2020-02-06 RX ADMIN — BUPIVACAINE HYDROCHLORIDE 5 ML: 5 INJECTION, SOLUTION PERINEURAL at 11:25

## 2020-02-06 NOTE — PATIENT INSTRUCTIONS
Fingernail or Toenail Removal, Adult, Care After  This sheet gives you information about how to care for yourself after your procedure. Your health care provider may also give you more specific instructions. If you have problems or questions, contact your health care provider.  What can I expect after the procedure?  After the procedure, it is common to have:  · Pain.  · Redness.  · Swelling.  · Soreness.  Follow these instructions at home:  · If you have a splint:  ? Do not put pressure on any part of the splint until it is fully hardened. This may take several hours.  ? Wear the splint as told by your health care provider. Remove it only as told by your health care provider.  ? Loosen the splint if your fingers or toes tingle, become numb, or turn cold and blue.  ? Keep the splint clean.  ? If the splint is not waterproof:  § Do not let it get wet.  § Cover it with a watertight covering when you take a bath or a shower.  Wound care    · Follow instructions from your health care provider about how to take care of your wound. Make sure you:  ? Wash your hands with soap and water before you change your bandage (dressing). If soap and water are not available, use hand .  ? Change your dressing as told by your health care provider.  ? Keep your dressing dry until your health care provider says it can be removed.  ? Leave stitches (sutures), skin glue, or adhesive strips in place. These skin closures may need to stay in place for 2 weeks or longer. If adhesive strip edges start to loosen and curl up, you may trim the loose edges. Do not remove adhesive strips completely unless your health care provider tells you to do that.  · Check your wound every day for signs of infection. Check for:  ? More redness, swelling, or pain.  ? More fluid or blood.  ? Warmth.  ? Pus or a bad smell.  Managing pain, stiffness, and swelling  · Move your fingers or toes often to avoid stiffness and to lessen swelling.  · Raise  (elevate) the injured area above the level of your heart while you are sitting or lying down. You may need to keep your finger or toe raised or supported on a pillow for 24 hours or as told by your health care provider.  · Soak your hand or foot in warm, soapy water for 10-20 minutes, 3 times a day or as told by your health care provider.  Medicine  · Take over-the-counter and prescription medicines only as told by your health care provider.  · If you were prescribed an antibiotic medicine, use it as told by your health care provider. Do not stop using the antibiotic even if your condition improves.  General instructions  · If you were given a shoe to wear, wear it as told by your health care provider.  · Keep all follow-up visits as told by your health care provider. This is important.  Contact a health care provider if:  · You have more redness, swelling, or pain around your wound.  · You have more fluid or blood coming from your wound.  · Your wound feels warm to the touch.  · You have pus or a bad smell coming from your wound.  · You have a fever.  · Your finger or toe looks blue or black.  This information is not intended to replace advice given to you by your health care provider. Make sure you discuss any questions you have with your health care provider.  Document Released: 01/08/2016 Document Revised: 08/16/2017 Document Reviewed: 06/26/2017  [employer] Interactive Patient Education © 2019 [employer]

## 2020-02-11 ENCOUNTER — HOSPITAL ENCOUNTER (OUTPATIENT)
Dept: WOMENS IMAGING | Age: 45
Discharge: HOME | End: 2020-02-11
Payer: MEDICAID

## 2020-02-11 PROCEDURE — 77063 BREAST TOMOSYNTHESIS BI: CPT

## 2020-02-18 NOTE — PROGRESS NOTES
[facility] - PODIATRY    Today's Date: 20    Patient Name: Zahida Geller  MRN: [id number]  [facility]: [id number]  PCP: Judith Mauro MD  Referring Provider: No ref. provider found    SUBJECTIVE     Chief Complaint   Patient presents with   • Follow-up     Removal of left great toenail two weeks ago.  Pt states that this has healed well.  Pt states that her pain level is 0/10.   Last blood sugar reading 117 mg/dl. Last saw PCP, Dr. Mauro, on 19.       HPI: Zahida Geller, a 44 y.o.female, comes to clinic as a(n) established patient for follow-up treatment of IGTN. Patient has h/o anxiety, asthma, back pain, depression, DM2, edema, HTN, TBI, previous substance abuse. Had TNA ~3 weeks ago. She did foot soaks for about 1 week and has been applying antibiotic ointment and bandage daily. Patient is NIDDM with last stated BG level of 117mg/dl.  Denies pain. Denies any constitutional symptoms. No other pedal complaints at this time.    Past Medical History:   Diagnosis Date   • Anxiety    • Asthma    • Chronic back pain    • Chronic hip pain    • Depression    • Diabetes mellitus (CMS/HCC)    • Edema of both legs    • Hypertension    • Restless leg syndrome    • Substance abuse in remission (CMS/HCC)    • Traumatic brain injury (CMS/HCC)      Past Surgical History:   Procedure Laterality Date   • BACK SURGERY      L5-L6 surgery   •  SECTION     • LEG SURGERY      Left leg jose ramon placement   • PEG TUBE INSERTION     • PEG TUBE REMOVAL     • TRACHEOSTOMY      with reversal   • TUBAL ABDOMINAL LIGATION       Family History   Problem Relation Age of Onset   • Heart murmur Mother    • Heart disease Father    • No Known Problems Brother    • No Known Problems Sister    • No Known Problems Brother      Social History     Socioeconomic History   • Marital status: Single     Spouse name: Not on file   • Number of children: Not on file   • Years of education: Not on file   • Highest education  level: Not on file   Tobacco Use   • Smoking status: Current Every Day Smoker     Packs/day: 0.00     Types: Electronic Cigarette   • Smokeless tobacco: Never Used   Substance and Sexual Activity   • Alcohol use: No   • Drug use: No     Comment: history of, narcotics   • Sexual activity: Never     Partners: Male     Birth control/protection: Injection     Comment: last act of intercourse a year ago     Allergies   Allergen Reactions   • Penicillins Other (See Comments)     As a child   • Tramadol Nausea And Vomiting     Current Outpatient Medications   Medication Sig Dispense Refill   • albuterol (PROVENTIL HFA;VENTOLIN HFA) 108 (90 Base) MCG/ACT inhaler Inhale.     • amitriptyline (ELAVIL) 50 MG tablet Take  by mouth.     • Blood Glucose Monitoring Suppl (FREESTYLE FREEDOM LITE) w/Device kit      • budesonide-formoterol (SYMBICORT) 80-4.5 MCG/ACT inhaler Inhale.     • esomeprazole (NEXIUM) 40 MG packet Take 40 mg by mouth.     • fluticasone (FLONASE) 50 MCG/ACT nasal spray into the nostril(s) as directed by provider.     • furosemide (LASIX) 40 MG tablet Take 40 mg by mouth.     • guaiFENesin (ROBITUSSIN) 100 MG/5ML syrup Take 200 mg by mouth As Needed for Cough.     • ibuprofen (ADVIL,MOTRIN) 800 MG tablet Take 800 mg by mouth.     • lidocaine (LIDODERM) 5 % Place 1 patch on the skin Daily As Needed for Moderate Pain (4-6). Remove & Discard patch within 12 hours or as directed by MD     • loratadine (CLARITIN) 10 MG tablet Take 10 mg by mouth.     • medroxyPROGESTERone (DEPO-PROVERA) 150 MG/ML injection Inject 150 mg into the appropriate muscle as directed by prescriber.     • MEDROXYPROGESTERONE ACE-LIDO IM Inject  into the appropriate muscle as directed by prescriber.     • melatonin 1 MG tablet Take 3 mg by mouth At Night As Needed for Sleep.     • metFORMIN (GLUCOPHAGE) 500 MG tablet Take 850 mg by mouth.     • metoprolol tartrate (LOPRESSOR) 100 MG tablet Take 100 mg by mouth 2 (Two) Times a Day.     •  "Multiple Vitamins-Minerals (THERA-M MULTIPLE VITAMINS PO) Take  by mouth.     • nateglinide (STARLIX) 60 MG tablet Take 60 mg by mouth.     • neomycin-bacitracin-polymyxin (NEOSPORIN) 5-400-5000 ointment Apply  topically As Needed.     • nystatin-triamcinolone (MYCOLOG II) 251319-9.1 UNIT/GM-% cream Apply  topically to the appropriate area as directed 2 (Two) Times a Day.     • oxyCODONE-acetaminophen (PERCOCET)  MG per tablet Take 1 tablet by mouth.     • PARoxetine (PAXIL) 30 MG tablet Take 30 mg by mouth.     • phenol (SORE THROAT SPRAY) 1.4 % liquid liquid Apply  to the mouth or throat As Needed.     • pregabalin (LYRICA) 100 MG capsule Take 100 mg by mouth 2 (Two) Times a Day.     • SITagliptin (JANUVIA) 50 MG tablet Take 50 mg by mouth.     • tiZANidine (ZANAFLEX) 4 MG tablet Take 4 mg by mouth.     • Insulin Syringe-Needle U-100 30G X 5/16\" 0.5 ML misc        No current facility-administered medications for this visit.      Review of Systems   Constitutional: Negative for chills and fever.   HENT: Negative for congestion.    Respiratory: Negative for shortness of breath.    Cardiovascular: Positive for leg swelling. Negative for chest pain.   Gastrointestinal: Negative for constipation, diarrhea, nausea and vomiting.   Musculoskeletal: Positive for gait problem.   Skin: Negative for wound.   Neurological: Positive for numbness.       OBJECTIVE     Vitals:    02/25/20 1048   BP: 124/80   Pulse: 81   SpO2: 98%       PHYSICAL EXAM  GEN:   A&Ox3, NAD. Pt presents to clinic ambulating with right AFO and wearing Casual Shoes. Accompanied by transporter.     NEURO:   Protective sensation intact to 9/10 sites Right foot, 9/10 site Left foot using [place]-Luis monofilament  Light touch sensation diminished  No Tinel's or Villeux sign.    VASC:  Skin temperature Warm to Warm proximal to distal gricel  DP pulses 2/4 Right, 2/4 Left  PT pulses 2/4 Right, 2/4 Left  CFT 3 sec gricel  Pedal hair growth absent  trace " edema noted gricel  Varicosities absent gricel    MUSC/SKEL:  Muscle Strength Right foot Dorsiflexors 3+/5, Plantarflexors 5/5, Evertors 5/5, Invertors 5/5  Muscle Strength Left foot Dorsiflexors 5/5, Plantarflexors 5/5, Evertors 5/5, Invertors 5/5  ROM of the 1st MTP is full without pain or crepitus  ROM of the MTJ is full without pain or crepitus    ROM of the STJ is full without pain or crepitus    ROM of the ankle joint is full without pain or crepitus    No POP during exam  Rectus foot type   No gross pedal musculoskeletal deformities noted.     DERM:  Pedal nails x8 are thickened, elongated and discolored with presence of subungual debris; Right hallux nail avulsed. Left hallux nail avulsed and healing well  Webspaces are Clean, Dry, and Intact  Skin is normal in turgor and texture   Hyperkeratotic tissue to plantar right HIPJ      RADIOLOGY/NUCLEAR:  No results found.    LABORATORY/CULTURE RESULTS:      PATHOLOGY RESULTS:       ASSESSMENT/PLAN     Zahida was seen today for follow-up.    Diagnoses and all orders for this visit:    Ingrown toenail    Type 2 diabetes mellitus with diabetic neuropathy, without long-term current use of insulin (CMS/HCC)    Traumatic brain injury with loss of consciousness, sequela (CMS/HCC)      Comprehensive lower extremity examination and evaluation was performed.  Discussed findings and treatment plan including risks, benefits, and treatment options with patient in detail. Patient agreed with treatment plan.  Avulsion site healing well. No SOI. Continue light bandage until full resolution.   Continue BG control.  Continue use of compression stockings and AFO  An After Visit Summary was printed and given to the patient at discharge, including (if requested) any available informative/educational handouts regarding diagnosis, treatment, or medications. All questions were answered to patient/family satisfaction. Should symptoms fail to improve or worsen they agree to call or return to  clinic or to go to the Emergency Department. Discussed the importance of following up with any needed screening tests/labs/specialist appointments and any requested follow-up recommended by me today. Importance of maintaining follow-up discussed and patient accepts that missed appointments can delay diagnosis and potentially lead to worsening of conditions.  Return in about 3 months (around 5/25/2020)., or sooner if acute issues arise.      Procedures    This document has been electronically signed by Christopher Dennis DPM on February 25, 2020 11:18 AM

## 2020-02-19 ENCOUNTER — TELEPHONE (OUTPATIENT)
Dept: PODIATRY | Facility: CLINIC | Age: 45
End: 2020-02-19

## 2020-02-25 ENCOUNTER — OFFICE VISIT (OUTPATIENT)
Dept: PODIATRY | Facility: CLINIC | Age: 45
End: 2020-02-25

## 2020-02-25 VITALS
HEART RATE: 81 BPM | DIASTOLIC BLOOD PRESSURE: 80 MMHG | SYSTOLIC BLOOD PRESSURE: 124 MMHG | WEIGHT: 228 LBS | HEIGHT: 69 IN | BODY MASS INDEX: 33.77 KG/M2 | OXYGEN SATURATION: 98 %

## 2020-02-25 DIAGNOSIS — S06.9X9S TRAUMATIC BRAIN INJURY WITH LOSS OF CONSCIOUSNESS, SEQUELA (HCC): ICD-10-CM

## 2020-02-25 DIAGNOSIS — L60.0 INGROWN TOENAIL: Primary | ICD-10-CM

## 2020-02-25 DIAGNOSIS — E11.40 TYPE 2 DIABETES MELLITUS WITH DIABETIC NEUROPATHY, WITHOUT LONG-TERM CURRENT USE OF INSULIN (HCC): ICD-10-CM

## 2020-02-25 PROBLEM — L03.039 CELLULITIS OF GREAT TOE: Status: RESOLVED | Noted: 2017-07-31 | Resolved: 2020-02-25

## 2020-02-25 PROCEDURE — 99212 OFFICE O/P EST SF 10 MIN: CPT | Performed by: PODIATRIST

## 2020-05-22 ENCOUNTER — TELEPHONE (OUTPATIENT)
Dept: PODIATRY | Facility: CLINIC | Age: 45
End: 2020-05-22

## 2020-07-09 ENCOUNTER — TELEPHONE (OUTPATIENT)
Dept: PODIATRY | Facility: CLINIC | Age: 45
End: 2020-07-09

## 2020-07-10 ENCOUNTER — OFFICE VISIT (OUTPATIENT)
Dept: PODIATRY | Facility: CLINIC | Age: 45
End: 2020-07-10

## 2020-07-10 VITALS
DIASTOLIC BLOOD PRESSURE: 60 MMHG | SYSTOLIC BLOOD PRESSURE: 115 MMHG | HEIGHT: 69 IN | HEART RATE: 95 BPM | OXYGEN SATURATION: 99 % | WEIGHT: 220.8 LBS | BODY MASS INDEX: 32.7 KG/M2

## 2020-07-10 DIAGNOSIS — M21.371 FOOT DROP, RIGHT: ICD-10-CM

## 2020-07-10 DIAGNOSIS — L60.3 DYSTROPHIC NAIL: ICD-10-CM

## 2020-07-10 DIAGNOSIS — B35.1 ONYCHOMYCOSIS: Primary | ICD-10-CM

## 2020-07-10 DIAGNOSIS — E11.40 TYPE 2 DIABETES MELLITUS WITH DIABETIC NEUROPATHY, WITHOUT LONG-TERM CURRENT USE OF INSULIN (HCC): ICD-10-CM

## 2020-07-10 DIAGNOSIS — L84 FOOT CALLUS: ICD-10-CM

## 2020-07-10 DIAGNOSIS — M79.672 FOOT PAIN, LEFT: ICD-10-CM

## 2020-07-10 DIAGNOSIS — S06.9X9S TRAUMATIC BRAIN INJURY WITH LOSS OF CONSCIOUSNESS, SEQUELA (HCC): ICD-10-CM

## 2020-07-10 PROCEDURE — 99213 OFFICE O/P EST LOW 20 MIN: CPT | Performed by: NURSE PRACTITIONER

## 2020-07-10 PROCEDURE — 11055 PARING/CUTG B9 HYPRKER LES 1: CPT | Performed by: NURSE PRACTITIONER

## 2020-07-10 PROCEDURE — 11721 DEBRIDE NAIL 6 OR MORE: CPT | Performed by: NURSE PRACTITIONER

## 2020-07-10 NOTE — PROGRESS NOTES
[facility] - PODIATRY    Today's Date: 07/10/20    Patient Name: Zahida Geller  MRN: [id number]  CSN: [id number]  PCP: Judith Mauro MD  Referring Provider: No ref. provider found    SUBJECTIVE     Chief Complaint   Patient presents with   • Follow-up     Pt is here today for nail care and f/u on Removal of left great toenail - pt c/o of the right foot greater toe having possible fungus growing on the toenail - PCP 2019   • Diabetes     last blood sugar reading is 180mg/dl.      HPI: Zahida Geller, a 44 y.o.female, comes to clinic as a(n) established patient presenting for diabetic foot exam, complaining of painful toenails and callus. Patient has h/o anxiety, asthma, back pain, depression, DM2, edema, HTN, TBI, previous substance abuse. Patient states that the callus to her right great toe has come back and can be painful from time to time but notes that it has improved with routine visits. Denies redness or drainage. Notes that her toenails are thick, long and painful. Left hallux nail was avulsed at previous visit and is starting to grow back slowly without complication. Due to neuropathy, she is uncomfortable cutting her own nails. Uses an AFO for Right side foot drop. Continues to wear compression stockings. Patient is NIDDM with last stated BG level of 180mg/dl.  Denies pain today. Denies any constitutional symptoms. No other pedal complaints at this time.    Past Medical History:   Diagnosis Date   • Anxiety    • Asthma    • Chronic back pain    • Chronic hip pain    • Depression    • Diabetes mellitus (CMS/HCC)    • Edema of both legs    • Hypertension    • Restless leg syndrome    • Substance abuse in remission (CMS/HCC)    • Traumatic brain injury (CMS/HCC)      Past Surgical History:   Procedure Laterality Date   • BACK SURGERY      L5-L6 surgery   •  SECTION     • LEG SURGERY      Left leg jose ramon placement   • PEG TUBE INSERTION     • PEG TUBE REMOVAL     •  "TRACHEOSTOMY      with reversal   • TUBAL ABDOMINAL LIGATION       Family History   Problem Relation Age of Onset   • Heart murmur Mother    • Heart disease Father    • No Known Problems Brother    • No Known Problems Sister    • No Known Problems Brother      Social History     Socioeconomic History   • Marital status: Single     Spouse name: Not on file   • Number of children: Not on file   • Years of education: Not on file   • Highest education level: Not on file   Tobacco Use   • Smoking status: Current Every Day Smoker     Packs/day: 0.00     Types: Electronic Cigarette   • Smokeless tobacco: Never Used   Substance and Sexual Activity   • Alcohol use: No   • Drug use: No     Comment: history of, narcotics   • Sexual activity: Never     Partners: Male     Birth control/protection: Injection     Comment: last act of intercourse a year ago     Allergies   Allergen Reactions   • Penicillins Other (See Comments)     As a child   • Tramadol Nausea And Vomiting     Current Outpatient Medications   Medication Sig Dispense Refill   • albuterol (PROVENTIL HFA;VENTOLIN HFA) 108 (90 Base) MCG/ACT inhaler Inhale.     • amitriptyline (ELAVIL) 50 MG tablet Take  by mouth.     • Blood Glucose Monitoring Suppl (FREESTYLE FREEDOM LITE) w/Device kit      • esomeprazole (NEXIUM) 40 MG packet Take 40 mg by mouth.     • fluticasone (FLONASE) 50 MCG/ACT nasal spray into the nostril(s) as directed by provider.     • furosemide (LASIX) 40 MG tablet Take 40 mg by mouth.     • guaiFENesin (ROBITUSSIN) 100 MG/5ML syrup Take 200 mg by mouth As Needed for Cough.     • ibuprofen (ADVIL,MOTRIN) 800 MG tablet Take 800 mg by mouth.     • Insulin Syringe-Needle U-100 30G X 5/16\" 0.5 ML misc      • lidocaine (LIDODERM) 5 % Place 1 patch on the skin Daily As Needed for Moderate Pain (4-6). Remove & Discard patch within 12 hours or as directed by MD     • loratadine (CLARITIN) 10 MG tablet Take 10 mg by mouth.     • medroxyPROGESTERone " (DEPO-PROVERA) 150 MG/ML injection Inject 150 mg into the appropriate muscle as directed by prescriber.     • MEDROXYPROGESTERONE ACE-LIDO IM Inject  into the appropriate muscle as directed by prescriber.     • melatonin 1 MG tablet Take 3 mg by mouth At Night As Needed for Sleep.     • metFORMIN (GLUCOPHAGE) 500 MG tablet Take 850 mg by mouth.     • metoprolol tartrate (LOPRESSOR) 100 MG tablet Take 100 mg by mouth 2 (Two) Times a Day.     • Multiple Vitamins-Minerals (THERA-M MULTIPLE VITAMINS PO) Take  by mouth.     • nateglinide (STARLIX) 60 MG tablet Take 60 mg by mouth.     • neomycin-bacitracin-polymyxin (NEOSPORIN) 5-400-5000 ointment Apply  topically As Needed.     • nystatin-triamcinolone (MYCOLOG II) 860117-3.1 UNIT/GM-% cream Apply  topically to the appropriate area as directed 2 (Two) Times a Day.     • oxyCODONE-acetaminophen (PERCOCET)  MG per tablet Take 1 tablet by mouth.     • PARoxetine (PAXIL) 30 MG tablet Take 30 mg by mouth.     • phenol (SORE THROAT SPRAY) 1.4 % liquid liquid Apply  to the mouth or throat As Needed.     • pregabalin (LYRICA) 100 MG capsule Take 100 mg by mouth 2 (Two) Times a Day.     • SITagliptin (JANUVIA) 50 MG tablet Take 50 mg by mouth.     • tiZANidine (ZANAFLEX) 4 MG tablet Take 4 mg by mouth.     • budesonide-formoterol (SYMBICORT) 80-4.5 MCG/ACT inhaler Inhale.       No current facility-administered medications for this visit.      Review of Systems   Constitutional: Negative for chills and fever.   HENT: Negative for congestion.    Respiratory: Negative for shortness of breath.    Cardiovascular: Positive for leg swelling. Negative for chest pain.   Gastrointestinal: Negative for constipation, diarrhea, nausea and vomiting.   Musculoskeletal: Positive for gait problem.   Skin: Negative for wound.   Neurological: Positive for numbness.       OBJECTIVE     Vitals:    07/10/20 1103   BP: 115/60   Pulse: 95   SpO2: 99%       PHYSICAL EXAM  Accompanied by  transporter.    Foot/Ankle Exam:       General:   Diabetic Foot Exam Performed    Appearance: appears stated age and healthy    Orientation: AAOx3    Affect: appropriate    Gait: antalgic    Assistance: independent    Shoe Gear:  Casual shoes (with AFO)    VASCULAR      Right Foot Vascularity   Dorsalis pedis:  2+  Posterior tibial:  2+  Skin Temperature: warm    Edema Gradin+ and pitting  CFT:  3  Pedal Hair Growth:  Present  Varicosities: mild varicosities       Left Foot Vascularity   Dorsalis pedis:  2+  Posterior tibial:  2+  Skin Temperature: warm    Edema Gradin+ and pitting  CFT:  3  Pedal Hair Growth:  Present  Varicosities: mild varicosities        NEUROLOGIC     Right Foot Neurologic   Light touch sensation:  Diminished  Vibratory sensation:  Diminished  Hot/Cold sensation: diminished    Protective Sensation using [place] Monofilament:  7     Left Foot Neurologic   Light touch sensation:  Diminished  Vibratory sensation:  Diminished  Hot/cold sensation: diminished    Protective Sensation using [place] Monofilament:  9     MUSCULOSKELETAL      Right Foot Musculoskeletal   Ecchymosis:  None  Tenderness: right foot callus    Arch:  Normal  Hallux valgus: No    Hallux limitus: No       Left Foot Musculoskeletal   Ecchymosis:  None  Tenderness: none    Arch:  Normal  Hallux valgus: No    Hallux limitus: No       MUSCLE STRENGTH     Right Foot Muscle Strength   Foot dorsiflexion:  3  Foot plantar flexion:  5  Foot inversion:  5  Foot eversion:  5     Left Foot Muscle Strength   Foot dorsiflexion:  5  Foot plantar flexion:  5  Foot inversion:  5  Foot eversion:  5     RANGE OF MOTION      Right Foot Range of Motion   Foot and ankle ROM within normal limits       Left Foot Range of Motion   Foot and ankle ROM within normal limits       DERMATOLOGIC     Right Foot Dermatologic   Skin: corn    Nails: onychomycosis, abnormally thick, dystrophic nails and absent (hallux)       Left Foot  Dermatologic   Skin: skin intact    Nails: onychomycosis, abnormally thick and dystrophic nails       Image:       RADIOLOGY/NUCLEAR:  No results found.    LABORATORY/CULTURE RESULTS:      PATHOLOGY RESULTS:       ASSESSMENT/PLAN     Zhaida was seen today for follow-up and diabetes.    Diagnoses and all orders for this visit:    Onychomycosis    Type 2 diabetes mellitus with diabetic neuropathy, without long-term current use of insulin (CMS/[facility])    Traumatic brain injury with loss of consciousness, sequela (CMS/HCC)    Dystrophic nail    Foot callus    Foot drop, right    Foot pain, left      Comprehensive lower extremity examination and evaluation was performed.  Discussed findings and treatment plan including risks, benefits, and treatment options with patient in detail. Patient agreed with treatment plan.  After verbal consent obtained, nail(s) x8 debrided of length and thickness with nail nipper without incidence  After verbal consent obtained, calluses x1 pared utilizing dermal curette and/or scalpel without incidence  Patient may maintain nails and calluses at home utilizing emery board or pumice stone between visits as needed  Reviewed at home diabetic foot care including daily foot checks   Continue monitoring BG and control under direction of PCP.  Continue use of compression stockings and AFO  An After Visit Summary was printed and given to the patient at discharge, including (if requested) any available informative/educational handouts regarding diagnosis, treatment, or medications. All questions were answered to patient/family satisfaction. Should symptoms fail to improve or worsen they agree to call or return to clinic or to go to the Emergency Department. Discussed the importance of following up with any needed screening tests/labs/specialist appointments and any requested follow-up recommended by me today. Importance of maintaining follow-up discussed and patient accepts that missed appointments can  delay diagnosis and potentially lead to worsening of conditions.  Return in about 3 months (around 10/10/2020)., or sooner if acute issues arise.          This document has been electronically signed by DEBRA Kraus on July 10, 2020 11:31

## 2020-09-04 ENCOUNTER — OFFICE VISIT (OUTPATIENT)
Age: 45
End: 2020-09-04

## 2020-09-04 VITALS — OXYGEN SATURATION: 95 %

## 2020-09-04 NOTE — PROGRESS NOTES
2020    Catia Gonzales (:  1975) is a 45 y.o. female, here requesting COVID-19 testing    History of Present Illness  Pt is here for COVID not evaluated in this clinic    Vitals:    20 0933   SpO2: 95%       ASSESSMENT  Screening for COVID-19/ Viral disease    PLAN    COVID-19 sample collected and submitted  Patient given detailed CDC instructions contained within After Visit Summary       An  electronic signature was used to authenticate this note.     --MIKE Boyd - CNP on 2020 at 9:34 AM

## 2020-09-04 NOTE — PATIENT INSTRUCTIONS
Preventing the Spread of Coronavirus Disease 2019 in Homes and Residential Communities   For the most recent information go to [facility]    Prevention steps for People with confirmed or suspected COVID-19 (including persons under investigation) who do not need to be hospitalized  and   People with confirmed COVID-19 who were hospitalized and determined to be medically stable to go home    Your healthcare provider and public health staff will evaluate whether you can be cared for at home. If it is determined that you do not need to be hospitalized and can be isolated at home, you will be monitored by staff from your local or state health department. You should follow the prevention steps below until a healthcare provider or local or state health department says you can return to your normal activities. Stay home except to get medical care  People who are mildly ill with COVID-19 are able to isolate at home during their illness. You should restrict activities outside your home, except for getting medical care. Do not go to work, school, or public areas. Avoid using public transportation, ride-sharing, or taxis. Separate yourself from other people and animals in your home  People: As much as possible, you should stay in a specific room and away from other people in your home. Also, you should use a separate bathroom, if available. Animals: You should restrict contact with pets and other animals while you are sick with COVID-19, just like you would around other people. Although there have not been reports of pets or other animals becoming sick with COVID-19, it is still recommended that people sick with COVID-19 limit contact with animals until more information is known about the virus. When possible, have another member of your household care for your animals while you are sick.  If you are sick with COVID-19, avoid contact with your pet, including be washed thoroughly with soap and water. Clean all high-touch surfaces everyday  High touch surfaces include counters, tabletops, doorknobs, bathroom fixtures, toilets, phones, keyboards, tablets, and bedside tables. Also, clean any surfaces that may have blood, stool, or body fluids on them. Use a household cleaning spray or wipe, according to the label instructions. Labels contain instructions for safe and effective use of the cleaning product including precautions you should take when applying the product, such as wearing gloves and making sure you have good ventilation during use of the product. Monitor your symptoms  Seek prompt medical attention if your illness is worsening (e.g., difficulty breathing). Before seeking care, call your healthcare provider and tell them that you have, or are being evaluated for, COVID-19. Put on a facemask before you enter the facility. These steps will help the healthcare providers office to keep other people in the office or waiting room from getting infected or exposed. Ask your healthcare provider to call the local or [facility] health department. Persons who are placed under active monitoring or facilitated self-monitoring should follow instructions provided by their local health department or occupational health professionals, as appropriate. When working with your local health department check their available hours. If you have a medical emergency and need to call 911, notify the dispatch personnel that you have, or are being evaluated for COVID-19. If possible, put on a facemask before emergency medical services arrive. Discontinuing home isolation  Patients with confirmed COVID-19 should remain under home isolation precautions until the risk of secondary transmission to others is thought to be low.  The decision to discontinue home isolation precautions should be made on a case-by-case basis, in consultation with healthcare providers and state and [facility] health

## 2020-09-05 LAB — SARS-COV-2, NAA: NOT DETECTED

## 2020-09-10 ENCOUNTER — TELEPHONE (OUTPATIENT)
Dept: VASCULAR SURGERY | Facility: CLINIC | Age: 45
End: 2020-09-10

## 2020-09-28 ENCOUNTER — HOSPITAL ENCOUNTER (EMERGENCY)
Facility: HOSPITAL | Age: 45
Discharge: HOME | End: 2020-09-29
Attending: INTERNAL MEDICINE | Admitting: INTERNAL MEDICINE

## 2020-09-28 DIAGNOSIS — S01.01XA SCALP LACERATION, INITIAL ENCOUNTER: ICD-10-CM

## 2020-09-28 DIAGNOSIS — S00.03XA CONTUSION OF SCALP, INITIAL ENCOUNTER: Primary | ICD-10-CM

## 2020-09-28 PROCEDURE — 99284 EMERGENCY DEPT VISIT MOD MDM: CPT

## 2020-09-29 ENCOUNTER — APPOINTMENT (OUTPATIENT)
Dept: CT IMAGING | Facility: HOSPITAL | Age: 45
End: 2020-09-29

## 2020-09-29 VITALS
HEIGHT: 68 IN | OXYGEN SATURATION: 94 % | RESPIRATION RATE: 16 BRPM | DIASTOLIC BLOOD PRESSURE: 85 MMHG | TEMPERATURE: 97.7 F | SYSTOLIC BLOOD PRESSURE: 122 MMHG | BODY MASS INDEX: 33.39 KG/M2 | WEIGHT: 220.3 LBS | HEART RATE: 86 BPM

## 2020-09-29 PROCEDURE — 90471 IMMUNIZATION ADMIN: CPT | Performed by: NURSE PRACTITIONER

## 2020-09-29 PROCEDURE — 25010000002 TDAP 5-2.5-18.5 LF-MCG/0.5 SUSPENSION: Performed by: NURSE PRACTITIONER

## 2020-09-29 PROCEDURE — 90715 TDAP VACCINE 7 YRS/> IM: CPT | Performed by: NURSE PRACTITIONER

## 2020-09-29 PROCEDURE — 70450 CT HEAD/BRAIN W/O DYE: CPT

## 2020-09-29 PROCEDURE — 72125 CT NECK SPINE W/O DYE: CPT

## 2020-09-29 RX ORDER — LIDOCAINE HYDROCHLORIDE AND EPINEPHRINE BITARTRATE 20; .01 MG/ML; MG/ML
10 INJECTION, SOLUTION SUBCUTANEOUS ONCE
Status: COMPLETED | OUTPATIENT
Start: 2020-09-29 | End: 2020-09-29

## 2020-09-29 RX ADMIN — TETANUS TOXOID, REDUCED DIPHTHERIA TOXOID AND ACELLULAR PERTUSSIS VACCINE, ADSORBED 0.5 ML: 5; 2.5; 8; 8; 2.5 SUSPENSION INTRAMUSCULAR at 00:42

## 2020-09-29 RX ADMIN — LIDOCAINE HYDROCHLORIDE,EPINEPHRINE BITARTRATE 10 ML: 20; .01 INJECTION, SOLUTION INFILTRATION; PERINEURAL at 00:39

## 2020-10-06 ENCOUNTER — NURSE TRIAGE (OUTPATIENT)
Dept: CALL CENTER | Facility: HOSPITAL | Age: 45
End: 2020-10-06

## 2020-10-06 NOTE — TELEPHONE ENCOUNTER
"Calling to see when the stiches are to be removed. She was seen in the ED on 09/28/2020 for a contusion of the scalp and received sutures. No documentation in that list a time frame to have sutures removed. Explained it is usually 7 to 14 days, closer to 14 with an head injury. The caller will check with PCP.     Reason for Disposition  • Health Information question, no triage required and triager able to answer question    Additional Information  • Negative: [1] Caller is not with the adult (patient) AND [2] reporting urgent symptoms  • Negative: Lab result questions  • Negative: Medication questions  • Negative: Caller can't be reached by phone  • Negative: Caller has already spoken to PCP or another triager  • Negative: RN needs further essential information from caller in order to complete triage  • Negative: Requesting regular office appointment  • Negative: [1] Caller requesting NON-URGENT health information AND [2] PCP's office is the best resource    Answer Assessment - Initial Assessment Questions  1. REASON FOR CALL or QUESTION: \"What is your reason for calling today?\" or \"How can I best help you?\" or \"What question do you have that I can help answer?\"      See note    Protocols used: INFORMATION ONLY CALL - NO TRIAGE-ADULT-AH      "

## 2020-10-20 ENCOUNTER — HOSPITAL ENCOUNTER (EMERGENCY)
Facility: HOSPITAL | Age: 45
Discharge: HOME | End: 2020-10-20
Attending: EMERGENCY MEDICINE | Admitting: EMERGENCY MEDICINE

## 2020-10-20 ENCOUNTER — HOSPITAL ENCOUNTER (EMERGENCY)
Facility: HOSPITAL | Age: 45
End: 2020-10-20

## 2020-10-20 VITALS
RESPIRATION RATE: 14 BRPM | HEIGHT: 69 IN | SYSTOLIC BLOOD PRESSURE: 120 MMHG | OXYGEN SATURATION: 95 % | BODY MASS INDEX: 33.33 KG/M2 | WEIGHT: 225 LBS | HEART RATE: 104 BPM | TEMPERATURE: 97.7 F | DIASTOLIC BLOOD PRESSURE: 68 MMHG

## 2020-10-20 DIAGNOSIS — S01.91XA LACERATION OF HEAD WITHOUT FOREIGN BODY, UNSPECIFIED PART OF HEAD, INITIAL ENCOUNTER: Primary | ICD-10-CM

## 2020-10-20 PROCEDURE — 99283 EMERGENCY DEPT VISIT LOW MDM: CPT

## 2020-10-20 RX ORDER — LIDOCAINE HYDROCHLORIDE AND EPINEPHRINE BITARTRATE 20; .01 MG/ML; MG/ML
10 INJECTION, SOLUTION SUBCUTANEOUS ONCE
Status: COMPLETED | OUTPATIENT
Start: 2020-10-20 | End: 2020-10-20

## 2020-10-20 RX ADMIN — LIDOCAINE HYDROCHLORIDE,EPINEPHRINE BITARTRATE 10 ML: 20; .01 INJECTION, SOLUTION INFILTRATION; PERINEURAL at 04:36

## 2020-10-20 NOTE — DISCHARGE INSTRUCTIONS
These sutures (there are 3) are dissolvable; meaning they should fall out on their own. To help this process, keep the wound lubricated throughout the day with antibiotic ointment for the next 7 days. You may then use a warm compress (water) 2-3 times daily to lightly dab the area to keep it wet. The sutures should fall out by day 10-14 but if they do not, please either return here or have another doctor remove them.     I did also offer Zahida a CT scan but she has declined. If you note any worsening mentation, complaints of weakness or headaches please have her return.

## 2020-10-20 NOTE — ED NOTES
Patient presents to the ED with the CC of head injury due to a fall. She states she went to sleep while using the bathroom causing her to fall of the toilet and hit her right lateral head 30 minutes ago. She denies a loc, n/v, or medication pta. [facility]. [facility] dropped her off at [facility] ED.      Marycruz Garza, RN  10/20/20 0232       Marycruz Garza RN  10/20/20 0238

## 2020-10-20 NOTE — ED PROVIDER NOTES
Subjective   44 y/o female from [facility] arrives for evaluation of a fall. She states she fell asleep on the comode falling and striking her head. She denies LOC or anticoagulation usage. While she suffers from a TBI she is alert and oriented x4 on my examination and able to provide all history. In fact, [facility] actually dropped her off rather than accompany her to the ED. She arrives as described.         Family, social and past history reviewed as below, prior documentation of H and Ps and other documentation are reviewed:    Past Medical History:  No date: Anxiety  No date: Asthma  No date: Chronic back pain  No date: Chronic hip pain  No date: Depression  No date: Diabetes mellitus (CMS/[facility])  No date: Edema of both legs  No date: Hypertension  No date: Restless leg syndrome  No date: Substance abuse in remission (CMS/[facility])  No date: Traumatic brain injury (CMS/HCC)    Past Surgical History:  No date: BACK SURGERY      Comment:  L5-L6 surgery  No date:  SECTION  No date: LEG SURGERY      Comment:  Left leg jose ramon placement  No date: PEG TUBE INSERTION  No date: PEG TUBE REMOVAL  No date: TRACHEOSTOMY      Comment:  with reversal  No date: TUBAL ABDOMINAL LIGATION    Social History    Socioeconomic History      Marital status: Single      Spouse name: Not on file      Number of children: Not on file      Years of education: Not on file      Highest education level: Not on file    Tobacco Use      Smoking status: Current Every Day Smoker        Packs/day: 0.00        Types: Electronic Cigarette      Smokeless tobacco: Never Used    Substance and Sexual Activity      Alcohol use: No      Drug use: No        Comment: history of, narcotics      Sexual activity: Never        Partners: Male        Birth control/protection: Injection        Comment: last act of intercourse a year ago      Family history: reviewed and noncontributory           Review of Systems   All other systems reviewed and are  negative.      Past Medical History:   Diagnosis Date   • Anxiety    • Asthma    • Chronic back pain    • Chronic hip pain    • Depression    • Diabetes mellitus (CMS/HCC)    • Edema of both legs    • Hypertension    • Restless leg syndrome    • Substance abuse in remission (CMS/HCC)    • Traumatic brain injury (CMS/HCC)        Allergies   Allergen Reactions   • Penicillins Other (See Comments)     As a child   • Tramadol Nausea And Vomiting       Past Surgical History:   Procedure Laterality Date   • BACK SURGERY      L5-L6 surgery   •  SECTION     • LEG SURGERY      Left leg jose ramon placement   • PEG TUBE INSERTION     • PEG TUBE REMOVAL     • TRACHEOSTOMY      with reversal   • TUBAL ABDOMINAL LIGATION         Family History   Problem Relation Age of Onset   • Heart murmur Mother    • Heart disease Father    • No Known Problems Brother    • No Known Problems Sister    • No Known Problems Brother        Social History     Socioeconomic History   • Marital status: Single     Spouse name: Not on file   • Number of children: Not on file   • Years of education: Not on file   • Highest education level: Not on file   Tobacco Use   • Smoking status: Current Every Day Smoker     Packs/day: 0.00     Types: Electronic Cigarette   • Smokeless tobacco: Never Used   Substance and Sexual Activity   • Alcohol use: No   • Drug use: No     Comment: history of, narcotics   • Sexual activity: Never     Partners: Male     Birth control/protection: Injection     Comment: last act of intercourse a year ago           Objective   Physical Exam  Vitals signs and nursing note reviewed.   Constitutional:       Appearance: Normal appearance.   HENT:      Head: Normocephalic.        Comments: Small 1.0 cm linear laceration, no active bleeding    There is a hematoma around this.     EOMI    No Preston's signs, no [place] eyes      Right Ear: External ear normal.      Left Ear: External ear normal.      Nose: Nose normal.      Mouth/Throat:         Mouth: Mucous membranes are moist.   Eyes:      Extraocular Movements: Extraocular movements intact.      Pupils: Pupils are equal, round, and reactive to light.   Neck:      Musculoskeletal: Normal range of motion and neck supple.   Cardiovascular:      Rate and Rhythm: Normal rate and regular rhythm.      Pulses: Normal pulses.   Pulmonary:      Effort: Pulmonary effort is normal.      Breath sounds: Normal breath sounds.   Abdominal:      General: Abdomen is flat.   Musculoskeletal: Normal range of motion.         General: No swelling, tenderness, deformity or signs of injury.   Skin:     General: Skin is warm and dry.      Capillary Refill: Capillary refill takes less than 2 seconds.   Neurological:      General: No focal deficit present.      Mental Status: She is alert and oriented to person, place, and time. Mental status is at baseline.      Cranial Nerves: No cranial nerve deficit.      Sensory: No sensory deficit.   Psychiatric:         Mood and Affect: Mood normal.         Behavior: Behavior normal.         Laceration Repair    Date/Time: 10/20/2020 3:58 AM  Performed by: Navin Garcia MD  Authorized by: Navin Garcia MD     Consent:     Consent obtained:  Verbal    Consent given by:  Patient    Risks discussed:  Infection, need for additional repair, nerve damage, poor wound healing, poor cosmetic result, pain, retained foreign body, tendon damage and vascular damage    Alternatives discussed:  No treatment  Anesthesia (see MAR for exact dosages):     Anesthesia method:  Local infiltration    Local anesthetic:  Lidocaine 1% WITH epi  Laceration details:     Location:  Face    Face location:  R eyebrow    Length (cm):  1  Repair type:     Repair type:  Simple  Pre-procedure details:     Preparation:  Patient was prepped and draped in usual sterile fashion and imaging obtained to evaluate for foreign bodies  Exploration:     Hemostasis achieved with:  Epinephrine and direct pressure     Wound exploration: wound explored through full range of motion and entire depth of wound probed and visualized      Wound extent: no areolar tissue violation noted, no fascia violation noted, no foreign bodies/material noted, no muscle damage noted, no nerve damage noted, no tendon damage noted, no underlying fracture noted and no vascular damage noted      Contaminated: no    Treatment:     Area cleansed with:  Betadine    Amount of cleaning:  Standard  Skin repair:     Repair method:  Sutures    Suture size:  5-0    Suture material:  Plain gut    Suture technique:  Simple interrupted    Number of sutures:  3  Approximation:     Approximation:  Close  Post-procedure details:     Dressing:  Antibiotic ointment    Patient tolerance of procedure:  Tolerated well, no immediate complications               ED Course  ED Course as of Oct 20 0428   Tue Oct 20, 2020   0426 Patient is alert and oriented x4, she does not wish for a CT scan. Her [place] head CT rules are 0. I think given she is not on anticoagulation, she has no alterations to mentation. Her neurological examination is normal that she can leave at this time.     [JH]      ED Course User Index  [JH] Navin Garcia MD      I elected for fast gut given where she lives this should make it easier than having to see another doctor to have them removed.       CT Head Without Contrast    (Results Pending)     Labs Reviewed - No data to display                                    MDM    Final diagnoses:   Laceration of head without foreign body, unspecified part of head, initial encounter            Navin Garcia MD  10/20/20 0427

## 2020-10-30 LAB
ALBUMIN SERPL-MCNC: 4.4 G/DL (ref 3.5–5.2)
ALP BLD-CCNC: 110 U/L (ref 35–104)
ALT SERPL-CCNC: 47 U/L (ref 5–33)
ANION GAP SERPL CALCULATED.3IONS-SCNC: 14 MMOL/L (ref 7–19)
AST SERPL-CCNC: 36 U/L (ref 5–32)
BASOPHILS ABSOLUTE: 0.1 K/UL (ref 0–0.2)
BASOPHILS RELATIVE PERCENT: 0.8 % (ref 0–1)
BILIRUB SERPL-MCNC: 0.3 MG/DL (ref 0.2–1.2)
BUN BLDV-MCNC: 20 MG/DL (ref 6–20)
CALCIUM SERPL-MCNC: 9.4 MG/DL (ref 8.6–10)
CHLORIDE BLD-SCNC: 103 MMOL/L (ref 98–111)
CHOLESTEROL, TOTAL: 146 MG/DL (ref 160–199)
CO2: 24 MMOL/L (ref 22–29)
CREAT SERPL-MCNC: 0.8 MG/DL (ref 0.5–0.9)
CREATININE URINE: 112.1 MG/DL (ref 4.2–622)
EOSINOPHILS ABSOLUTE: 0.1 K/UL (ref 0–0.6)
EOSINOPHILS RELATIVE PERCENT: 2.2 % (ref 0–5)
GFR AFRICAN AMERICAN: >59
GFR NON-AFRICAN AMERICAN: >60
GLUCOSE BLD-MCNC: 125 MG/DL (ref 74–109)
HBA1C MFR BLD: 7.1 % (ref 4–6)
HCT VFR BLD CALC: 37.9 % (ref 37–47)
HDLC SERPL-MCNC: 25 MG/DL (ref 65–121)
HEMOGLOBIN: 12 G/DL (ref 12–16)
IMMATURE GRANULOCYTES #: 0.1 K/UL
LDL CHOLESTEROL CALCULATED: 73 MG/DL
LYMPHOCYTES ABSOLUTE: 1.9 K/UL (ref 1.1–4.5)
LYMPHOCYTES RELATIVE PERCENT: 28.8 % (ref 20–40)
MCH RBC QN AUTO: 28.8 PG (ref 27–31)
MCHC RBC AUTO-ENTMCNC: 31.7 G/DL (ref 33–37)
MCV RBC AUTO: 90.9 FL (ref 81–99)
MICROALBUMIN UR-MCNC: 7.7 MG/DL (ref 0–19)
MICROALBUMIN/CREAT UR-RTO: 68.7 MG/G
MONOCYTES ABSOLUTE: 0.4 K/UL (ref 0–0.9)
MONOCYTES RELATIVE PERCENT: 6.8 % (ref 0–10)
NEUTROPHILS ABSOLUTE: 3.9 K/UL (ref 1.5–7.5)
NEUTROPHILS RELATIVE PERCENT: 60.6 % (ref 50–65)
PDW BLD-RTO: 13.4 % (ref 11.5–14.5)
PLATELET # BLD: 285 K/UL (ref 130–400)
PMV BLD AUTO: 9.4 FL (ref 9.4–12.3)
POTASSIUM SERPL-SCNC: 5.2 MMOL/L (ref 3.5–5)
RBC # BLD: 4.17 M/UL (ref 4.2–5.4)
SODIUM BLD-SCNC: 141 MMOL/L (ref 136–145)
TOTAL PROTEIN: 7.5 G/DL (ref 6.6–8.7)
TRIGL SERPL-MCNC: 238 MG/DL (ref 0–149)
WBC # BLD: 6.4 K/UL (ref 4.8–10.8)

## 2020-11-05 NOTE — PROGRESS NOTES
[facility] - PODIATRY    Today's Date: 20    Patient Name: Zahida Geller  MRN: [id number]  CSN: [id number]  PCP: Judith Mauro MD  Referring Provider: No ref. provider found    SUBJECTIVE     Chief Complaint   Patient presents with   • Follow-up     Pt is here today for nail care - pt presents with thickened toenails with discoloration - pt denies any pain at this moment - pcp 2019   • Diabetes     last blood sugar reading is 156mg/dl      HPI: Zahida Geller, a 45 y.o.female, comes to clinic as a(n) established patient complaining of painful toenails and callus. Patient has h/o anxiety, asthma, back pain, depression, DM2, edema, HTN, TBI, previous substance abuse. Patient relates that callus of right foot has returned but it is smaller than at previous visits. She states that left hallux is thickened and misshapen but is not painful. Has been taking a file to help with thickness of nail. Due to neuropathy, she is uncomfortable cutting her own nails. Continues to wear compression stockings. Patient is NIDDM with last stated BG level of 156mg/dl.  Denies pain today. Denies any constitutional symptoms. No other pedal complaints at this time.    Past Medical History:   Diagnosis Date   • Anxiety    • Asthma    • Chronic back pain    • Chronic hip pain    • Depression    • Diabetes mellitus (CMS/HCC)    • Edema of both legs    • Hypertension    • Restless leg syndrome    • Substance abuse in remission (CMS/HCC)    • Traumatic brain injury (CMS/HCC)      Past Surgical History:   Procedure Laterality Date   • BACK SURGERY      L5-L6 surgery   •  SECTION     • LEG SURGERY      Left leg jose ramon placement   • PEG TUBE INSERTION     • PEG TUBE REMOVAL     • TRACHEOSTOMY      with reversal   • TUBAL ABDOMINAL LIGATION       Family History   Problem Relation Age of Onset   • Heart murmur Mother    • Heart disease Father    • No Known Problems Brother    • No Known Problems Sister    •  "No Known Problems Brother      Social History     Socioeconomic History   • Marital status: Single     Spouse name: Not on file   • Number of children: Not on file   • Years of education: Not on file   • Highest education level: Not on file   Tobacco Use   • Smoking status: Current Every Day Smoker     Packs/day: 0.00     Types: Electronic Cigarette   • Smokeless tobacco: Never Used   Substance and Sexual Activity   • Alcohol use: No   • Drug use: No     Comment: history of, narcotics   • Sexual activity: Never     Partners: Male     Birth control/protection: Injection     Comment: last act of intercourse a year ago     Allergies   Allergen Reactions   • Penicillins Other (See Comments)     As a child   • Tramadol Nausea And Vomiting     Current Outpatient Medications   Medication Sig Dispense Refill   • albuterol (PROVENTIL HFA;VENTOLIN HFA) 108 (90 Base) MCG/ACT inhaler Inhale.     • amitriptyline (ELAVIL) 50 MG tablet Take  by mouth.     • Blood Glucose Monitoring Suppl (FREESTYLE FREEDOM LITE) w/Device kit      • budesonide-formoterol (SYMBICORT) 80-4.5 MCG/ACT inhaler Inhale.     • calcium carbonate (TUMS) 500 MG chewable tablet Chew 1 tablet Daily.     • diphenhydrAMINE (BENADRYL) 25 mg capsule Take 25 mg by mouth Every 6 (Six) Hours As Needed for Itching.     • docosanol (ABREVA) 10 % cream cream Apply  topically to the appropriate area as directed 5 (Five) Times a Day.     • esomeprazole (NEXIUM) 40 MG packet Take 40 mg by mouth.     • fluticasone (FLONASE) 50 MCG/ACT nasal spray into the nostril(s) as directed by provider.     • furosemide (LASIX) 40 MG tablet Take 40 mg by mouth.     • guaiFENesin (ROBITUSSIN) 100 MG/5ML syrup Take 200 mg by mouth As Needed for Cough.     • ibuprofen (ADVIL,MOTRIN) 800 MG tablet Take 800 mg by mouth.     • Insulin Syringe-Needle U-100 30G X 5/16\" 0.5 ML misc      • lidocaine (LIDODERM) 5 % Place 1 patch on the skin Daily As Needed for Moderate Pain (4-6). Remove & Discard " patch within 12 hours or as directed by MD     • loratadine (CLARITIN) 10 MG tablet Take 10 mg by mouth.     • medroxyPROGESTERone (DEPO-PROVERA) 150 MG/ML injection Inject 150 mg into the appropriate muscle as directed by prescriber.     • MEDROXYPROGESTERONE ACE-LIDO IM Inject  into the appropriate muscle as directed by prescriber.     • metFORMIN (GLUCOPHAGE) 500 MG tablet Take 850 mg by mouth.     • metoprolol tartrate (LOPRESSOR) 100 MG tablet Take 100 mg by mouth 2 (Two) Times a Day.     • Multiple Vitamins-Minerals (THERA-M MULTIPLE VITAMINS PO) Take  by mouth.     • nateglinide (STARLIX) 60 MG tablet Take 60 mg by mouth.     • nystatin-triamcinolone (MYCOLOG II) 491343-6.1 UNIT/GM-% cream Apply  topically to the appropriate area as directed 2 (Two) Times a Day.     • oxyCODONE-acetaminophen (PERCOCET)  MG per tablet Take 1 tablet by mouth.     • PARoxetine (PAXIL) 30 MG tablet Take 30 mg by mouth.     • phenol (SORE THROAT SPRAY) 1.4 % liquid liquid Apply  to the mouth or throat As Needed.     • tiZANidine (ZANAFLEX) 4 MG tablet Take 4 mg by mouth.       No current facility-administered medications for this visit.      Review of Systems   Constitutional: Negative for chills and fever.   HENT: Negative for congestion.    Respiratory: Negative for shortness of breath.    Cardiovascular: Positive for leg swelling. Negative for chest pain.   Gastrointestinal: Negative for constipation, diarrhea, nausea and vomiting.   Musculoskeletal: Positive for gait problem.   Skin: Negative for wound.   Neurological: Positive for numbness.       OBJECTIVE     Vitals:    11/09/20 1130   BP: 115/67   Pulse: 69   SpO2: 98%       PHYSICAL EXAM  Accompanied by none    Foot/Ankle Exam:       General:   Appearance: appears stated age and healthy    Orientation: AAOx3    Affect: appropriate    Gait: antalgic    Assistance: independent    Shoe Gear:  Casual shoes (with AFO)    VASCULAR      Right Foot Vascularity   Dorsalis  pedis:  2+  Posterior tibial:  2+  Skin Temperature: warm    Edema Gradin+ and pitting  CFT:  3  Pedal Hair Growth:  Present  Varicosities: mild varicosities       Left Foot Vascularity   Dorsalis pedis:  2+  Posterior tibial:  2+  Skin Temperature: warm    Edema Gradin+ and pitting  CFT:  3  Pedal Hair Growth:  Present  Varicosities: mild varicosities        NEUROLOGIC     Right Foot Neurologic   Light touch sensation:  Diminished  Vibratory sensation:  Diminished  Hot/Cold sensation: diminished    Protective Sensation using [place] Monofilament:  Diminished     Left Foot Neurologic   Light touch sensation:  Diminished  Vibratory sensation:  Diminished  Hot/cold sensation: diminished    Protective Sensation using [place] Monofilament:  Diminished     MUSCULOSKELETAL      Right Foot Musculoskeletal   Ecchymosis:  None  Tenderness: none    Arch:  Normal  Hallux valgus: No    Hallux limitus: No       Left Foot Musculoskeletal   Ecchymosis:  None  Tenderness: none    Arch:  Normal  Hallux valgus: No    Hallux limitus: No       MUSCLE STRENGTH     Right Foot Muscle Strength   Foot dorsiflexion:  3  Foot plantar flexion:  5  Foot inversion:  5  Foot eversion:  5     Left Foot Muscle Strength   Foot dorsiflexion:  5  Foot plantar flexion:  5  Foot inversion:  5  Foot eversion:  5     RANGE OF MOTION      Right Foot Range of Motion   Foot and ankle ROM within normal limits       Left Foot Range of Motion   Foot and ankle ROM within normal limits       DERMATOLOGIC     Right Foot Dermatologic   Skin: corn    Nails: onychomycosis, abnormally thick, dystrophic nails and absent (hallux)       Left Foot Dermatologic   Skin: skin intact    Nails: onychomycosis, abnormally thick and dystrophic nails       Image:       RADIOLOGY/NUCLEAR:  No results found.    LABORATORY/CULTURE RESULTS:      PATHOLOGY RESULTS:       ASSESSMENT/PLAN     Diagnoses and all orders for this visit:    1. Onychomycosis  (Primary)    2. Type 2 diabetes mellitus with diabetic neuropathy, without long-term current use of insulin (CMS/[facility])    3. Foot callus    4. Dystrophic nail    5. Foot drop, right    6. Foot pain, left    7. Traumatic brain injury with loss of consciousness, sequela (CMS/[facility])      Comprehensive lower extremity examination and evaluation was performed.  Discussed findings and treatment plan including risks, benefits, and treatment options with patient in detail. Patient agreed with treatment plan.  After verbal consent obtained, nail(s) x9 debrided of length and thickness with nail nipper without incidence  After verbal consent obtained, calluses x1 pared utilizing dermal curette and/or scalpel without incidence  Patient may maintain nails and calluses at home utilizing emery board or pumice stone between visits as needed  Reviewed at home diabetic foot care including daily foot checks   Continue monitoring BG and control under direction of PCP.  Continue use of compression stockings and AFO  Advised that left hallux nail is growing irregularly due to previous nail avulsion. As long as there is not pain or other complications, would recommend continued filing of the nail only.  An After Visit Summary was printed and given to the patient at discharge, including (if requested) any available informative/educational handouts regarding diagnosis, treatment, or medications. All questions were answered to patient/family satisfaction. Should symptoms fail to improve or worsen they agree to call or return to clinic or to go to the Emergency Department. Discussed the importance of following up with any needed screening tests/labs/specialist appointments and any requested follow-up recommended by me today. Importance of maintaining follow-up discussed and patient accepts that missed appointments can delay diagnosis and potentially lead to worsening of conditions.  Return in about 3 months (around 2/9/2021)., or sooner if acute  issues arise.          This document has been electronically signed by DEBRA Kraus on November 9, 2020 11:52 CST

## 2020-11-06 ENCOUNTER — TELEPHONE (OUTPATIENT)
Dept: PODIATRY | Facility: CLINIC | Age: 45
End: 2020-11-06

## 2020-11-09 ENCOUNTER — OFFICE VISIT (OUTPATIENT)
Dept: PODIATRY | Facility: CLINIC | Age: 45
End: 2020-11-09

## 2020-11-09 VITALS
BODY MASS INDEX: 33.33 KG/M2 | HEART RATE: 69 BPM | WEIGHT: 225 LBS | SYSTOLIC BLOOD PRESSURE: 115 MMHG | OXYGEN SATURATION: 98 % | DIASTOLIC BLOOD PRESSURE: 67 MMHG | HEIGHT: 69 IN

## 2020-11-09 DIAGNOSIS — L84 FOOT CALLUS: ICD-10-CM

## 2020-11-09 DIAGNOSIS — E11.40 TYPE 2 DIABETES MELLITUS WITH DIABETIC NEUROPATHY, WITHOUT LONG-TERM CURRENT USE OF INSULIN (HCC): ICD-10-CM

## 2020-11-09 DIAGNOSIS — M21.371 FOOT DROP, RIGHT: ICD-10-CM

## 2020-11-09 DIAGNOSIS — L60.3 DYSTROPHIC NAIL: ICD-10-CM

## 2020-11-09 DIAGNOSIS — M79.672 FOOT PAIN, LEFT: ICD-10-CM

## 2020-11-09 DIAGNOSIS — B35.1 ONYCHOMYCOSIS: Primary | ICD-10-CM

## 2020-11-09 DIAGNOSIS — S06.9X9S TRAUMATIC BRAIN INJURY WITH LOSS OF CONSCIOUSNESS, SEQUELA (HCC): ICD-10-CM

## 2020-11-09 PROCEDURE — 11055 PARING/CUTG B9 HYPRKER LES 1: CPT | Performed by: NURSE PRACTITIONER

## 2020-11-09 PROCEDURE — 11721 DEBRIDE NAIL 6 OR MORE: CPT | Performed by: NURSE PRACTITIONER

## 2020-11-09 RX ORDER — CALCIUM CARBONATE 200(500)MG
2 TABLET,CHEWABLE ORAL 3 TIMES DAILY PRN
COMMUNITY

## 2020-11-09 RX ORDER — DIPHENHYDRAMINE HCL 25 MG
25 CAPSULE ORAL EVERY 6 HOURS PRN
COMMUNITY
End: 2021-05-10

## 2020-11-09 RX ORDER — DOCOSANOL 100 MG/G
CREAM TOPICAL
COMMUNITY
End: 2021-05-10

## 2020-12-04 ENCOUNTER — OFFICE VISIT (OUTPATIENT)
Age: 45
End: 2020-12-04

## 2020-12-04 VITALS — HEART RATE: 96 BPM | TEMPERATURE: 97.6 F | OXYGEN SATURATION: 94 %

## 2020-12-06 LAB — SARS-COV-2, NAA: DETECTED

## 2020-12-28 NOTE — TELEPHONE ENCOUNTER
Chief Complaint:   Chief Complaint   Patient presents with   â¢ Office Visit     Returning to Work Assessment - R Foot     History of Present Illness: Cherie Robert, a 42year old female, presents to the clinic originally in consultation from Myrna Davis, [address] [place] [address] with plantar fasciitis, right. Patient relates insidious onset is severe foot pain, 1st noticing when she woke up in the morning with pain post static and dyskinetic however otherwise up to 10/10 with any weight-bearing localized diffusely posterior and inferior right heel. Interval history - patient was lost to follow-up, last seen in September. Patient states she is coming in for return to work assessment and that every time she is ready to return back to work she notices increased pain. She relates concerns as she cannot go back to work part-time because her work comp is not approved so she does not want to go back part-time and have decreased income and so wishes to go back full-time if she does return; still working with paperwork to have work comp claim approved. Patient now claiming that she actually developed pain during work the day prior to what she previously reported. Patient is requesting physician re-evaluate work comp paperwork. Chart review does show that patient has been working with her primary care provider recently for work comp paperwork and a written summary was provided to a law firm, patient does not offer this information otherwise. Patient relates that she does have some improvement with physical therapy however shortly after therapy completed and the next day pain was back to baseline and more pain inferior aspect right heel at this time which is intermittent, post static and dyskinetic and at end of day with prolonged weight-bearing with pain up to 6/10.   Patient coming in late for appointment today but I did agree to see    ROS - fibromyalgia, right lumbar radiculopathy, chronic back pain    Medications, allergies, past S/W pt , confirmed appt to see Castillo RICHARDSON - on 11/9/20   "medical history, past surgical history, social history, and family history marked as reviewed in the electronic medical record. All systems reviewed negative other than as noted in the History of Present Illness    Objective:  General: Alert and oriented x3, well-developed, 42year old female  Vitals:   Visit Vitals  Pulse 77   Temp 97 Â°F (36.1 Â°C) (Temporal)   Ht 5' 2"" (1.575 m)   LMP 10/24/2014   SpO2 97%   BMI 37.11 kg/mÂ²      Lower Extremity Exam  Cardiovascular: Dorsalis pedis and posterior tibial pulses +2/4. Capillary refill less than 3 seconds to all digits bilaterally. Neurological: Gross and protective sensation intact bilaterally. Negative Tinel sign along tibial nerve on the right  Musculoskeletal: Muscular strength intact 5/5 for invertors, evertors, dorsiflexors and plantar flexors. Range of motion is decreased with ankle dorsiflexion on the right with knee extended with pain posteriorly. Minimal pain remaining along Achilles tendon from midsubstance to insertion. Localized mild pain on palpation medial band of plantar fascia along proximal 1/3, most acute at insertion and less pronounced pain central insertion plantar fascia band  Dermatological: Skin of normal texture and turgor with hair growth present digitally. No ecchymosis or localized edema on the right    Assessment:  Plantar fasciitis, right  (primary encounter diagnosis)  Right lumbar radiculitis  Pain in right foot  Tendonitis, Achilles, right  Gastrocnemius equinus, right    Plan:  1. MRI once again reviewed with patient showing no significant pathology however pain is still most consistent with musculoskeletal pain considering increased pain with weight-bearing which is relieved with nonweightbearing  2. Radiculitis versus nerve entrapment. Patient relates continued pain. MRI essentially normal and there has been continued improvement however patient relates continued limitations.   Considering medical history, additional workup " reviewed and patient is opting for EMG with order placed for EMG right lower extremity the  3. Achilles tendinitis right-significant improvement since last being seen with minimal pain remaining, essentially resolved. 4. Equinus right-recommend continued stretching and relation to plantar fasciitis reviewed  5. Plantar fasciitis right-etiology and treatment options reviewed. Continue with physical therapy. Recommend supportive shoe gear, foot orthotics. Will await EMG. There is improvement since last being seen. Patient declining steroid injection with pain otherwise. I did review with patient that I am happy to fill out any paperwork on our end that is needed. From last evaluation, all paperwork was filled out at patient request and filled out appropriately. She states she will bring in additional paperwork. I again stressed to patient that I can fill out any paperwork to the best of my knowledge. Unfortunately, this does not guarantee coverage for a work comp claim. I am happy to re-evaluate claim with any additional information provided. Also reviewed with patient that possible work related incidence may better be correlated if patient is able to provide work route and/or schedule and which increased activity with route may be able to be correlated with work related injury as plantar fasciitis and Achilles tendinitis diagnosis otherwise is typically an overuse injury as patient having no acute injury but again this does not guarantee approval of claim. 6. Follow-up after EMG, sooner as needed.

## 2021-02-02 NOTE — PROGRESS NOTES
[facility] - PODIATRY    Today's Date: 21    Patient Name: Zahida Geller  MRN: [id number]  CSN: [id number]  PCP: Judith Mauro MD  Referring Provider: No ref. provider found    SUBJECTIVE     Chief Complaint   Patient presents with   • Follow-up      Pt is here today for nail care - pt presents with thickened toenails with discoloration - pt denies any pain at this moment - pcp 2019   • Diabetes     pt is unaware of the last blood sugar reading      HPI: Zahida Geller, a 45 y.o.female, comes to clinic as a(n) established patient presenting for diabetic foot exam, complaining of painful toenails and callus. Patient has h/o anxiety, asthma, back pain, depression, DM2, edema, HTN, TBI, previous substance abuse. Continues to note that she is trying to take care of calluses between visits. Has been taking a file to help with thickness of nail. Due to neuropathy, she is uncomfortable cutting her own nails. Continues to wear compression stockings but not today. Patient is NIDDM and unsure of last BG level.  Denies pain today. Denies any constitutional symptoms. No other pedal complaints at this time.    Past Medical History:   Diagnosis Date   • Anxiety    • Asthma    • Chronic back pain    • Chronic hip pain    • Depression    • Diabetes mellitus (CMS/HCC)    • Edema of both legs    • Hypertension    • Restless leg syndrome    • Substance abuse in remission (CMS/HCC)    • Traumatic brain injury (CMS/HCC)      Past Surgical History:   Procedure Laterality Date   • BACK SURGERY      L5-L6 surgery   •  SECTION     • LEG SURGERY      Left leg jose ramon placement   • PEG TUBE INSERTION     • PEG TUBE REMOVAL     • TRACHEOSTOMY      with reversal   • TUBAL ABDOMINAL LIGATION       Family History   Problem Relation Age of Onset   • Heart murmur Mother    • Heart disease Father    • No Known Problems Brother    • No Known Problems Sister    • No Known Problems Brother      Social History    "    Socioeconomic History   • Marital status: Single     Spouse name: Not on file   • Number of children: Not on file   • Years of education: Not on file   • Highest education level: Not on file   Tobacco Use   • Smoking status: Current Every Day Smoker     Packs/day: 0.00     Types: Electronic Cigarette   • Smokeless tobacco: Never Used   Substance and Sexual Activity   • Alcohol use: No   • Drug use: No     Comment: history of, narcotics   • Sexual activity: Never     Partners: Male     Birth control/protection: Injection     Comment: last act of intercourse a year ago     Allergies   Allergen Reactions   • Penicillins Other (See Comments)     As a child   • Tramadol Nausea And Vomiting     Current Outpatient Medications   Medication Sig Dispense Refill   • albuterol (PROVENTIL HFA;VENTOLIN HFA) 108 (90 Base) MCG/ACT inhaler Inhale.     • amitriptyline (ELAVIL) 50 MG tablet Take  by mouth.     • Blood Glucose Monitoring Suppl (FREESTYLE FREEDOM LITE) w/Device kit      • budesonide-formoterol (SYMBICORT) 80-4.5 MCG/ACT inhaler Inhale.     • calcium carbonate (TUMS) 500 MG chewable tablet Chew 1 tablet Daily.     • diphenhydrAMINE (BENADRYL) 25 mg capsule Take 25 mg by mouth Every 6 (Six) Hours As Needed for Itching.     • docosanol (ABREVA) 10 % cream cream Apply  topically to the appropriate area as directed 5 (Five) Times a Day.     • esomeprazole (NEXIUM) 40 MG packet Take 40 mg by mouth.     • fluticasone (FLONASE) 50 MCG/ACT nasal spray into the nostril(s) as directed by provider.     • furosemide (LASIX) 40 MG tablet Take 40 mg by mouth.     • guaiFENesin (ROBITUSSIN) 100 MG/5ML syrup Take 200 mg by mouth As Needed for Cough.     • ibuprofen (ADVIL,MOTRIN) 800 MG tablet Take 800 mg by mouth.     • Insulin Syringe-Needle U-100 30G X 5/16\" 0.5 ML misc      • lidocaine (LIDODERM) 5 % Place 1 patch on the skin Daily As Needed for Moderate Pain (4-6). Remove & Discard patch within 12 hours or as directed by MD   "   • loratadine (CLARITIN) 10 MG tablet Take 10 mg by mouth.     • medroxyPROGESTERone (DEPO-PROVERA) 150 MG/ML injection Inject 150 mg into the appropriate muscle as directed by prescriber.     • MEDROXYPROGESTERONE ACE-LIDO IM Inject  into the appropriate muscle as directed by prescriber.     • metFORMIN (GLUCOPHAGE) 500 MG tablet Take 850 mg by mouth.     • metoprolol tartrate (LOPRESSOR) 100 MG tablet Take 100 mg by mouth 2 (Two) Times a Day.     • Multiple Vitamins-Minerals (THERA-M MULTIPLE VITAMINS PO) Take  by mouth.     • nateglinide (STARLIX) 60 MG tablet Take 60 mg by mouth.     • nystatin-triamcinolone (MYCOLOG II) 413273-9.1 UNIT/GM-% cream Apply  topically to the appropriate area as directed 2 (Two) Times a Day.     • oxyCODONE-acetaminophen (PERCOCET)  MG per tablet Take 1 tablet by mouth.     • PARoxetine (PAXIL) 30 MG tablet Take 30 mg by mouth.     • phenol (SORE THROAT SPRAY) 1.4 % liquid liquid Apply  to the mouth or throat As Needed.     • tiZANidine (ZANAFLEX) 4 MG tablet Take 4 mg by mouth.       No current facility-administered medications for this visit.      Review of Systems   Constitutional: Negative for chills and fever.   HENT: Negative for congestion.    Respiratory: Negative for shortness of breath.    Cardiovascular: Positive for leg swelling. Negative for chest pain.   Gastrointestinal: Negative for constipation, diarrhea, nausea and vomiting.   Musculoskeletal: Positive for gait problem.   Skin: Negative for wound.   Neurological: Positive for numbness.       OBJECTIVE     Vitals:    02/09/21 1126   BP: 120/80   Pulse: 95   SpO2: 98%       PHYSICAL EXAM  Accompanied by none    Foot/Ankle Exam:       General:   Appearance: appears stated age and healthy    Orientation: AAOx3    Affect: appropriate    Gait: antalgic    Assistance: independent    Shoe Gear:  Casual shoes    VASCULAR      Right Foot Vascularity   Dorsalis pedis:  2+  Posterior tibial:  2+  Skin Temperature: warm     Edema Grading:  Trace and non-pitting  CFT:  3  Pedal Hair Growth:  Present  Varicosities: mild varicosities       Left Foot Vascularity   Dorsalis pedis:  2+  Posterior tibial:  2+  Skin Temperature: warm    Edema Gradin+, non-pitting and trace  CFT:  3  Pedal Hair Growth:  Present  Varicosities: mild varicosities        NEUROLOGIC     Right Foot Neurologic   Light touch sensation:  Diminished  Vibratory sensation:  Diminished  Hot/Cold sensation: diminished    Protective Sensation using Wagener-Luis Monofilament:  7     Left Foot Neurologic   Light touch sensation:  Diminished  Vibratory sensation:  Diminished  Hot/cold sensation: diminished    Protective Sensation using Wagener-Luis Monofilament:  8     MUSCULOSKELETAL      Right Foot Musculoskeletal   Ecchymosis:  None  Tenderness: none    Arch:  Normal  Hallux valgus: No    Hallux limitus: No       Left Foot Musculoskeletal   Ecchymosis:  None  Tenderness: none    Arch:  Normal  Hallux valgus: No    Hallux limitus: No       MUSCLE STRENGTH     Right Foot Muscle Strength   Foot dorsiflexion:  3  Foot plantar flexion:  5  Foot inversion:  5  Foot eversion:  5     Left Foot Muscle Strength   Foot dorsiflexion:  5  Foot plantar flexion:  5  Foot inversion:  5  Foot eversion:  5     RANGE OF MOTION      Right Foot Range of Motion   Foot and ankle ROM within normal limits       Left Foot Range of Motion   Foot and ankle ROM within normal limits       DERMATOLOGIC     Right Foot Dermatologic   Skin: corn    Nails: onychomycosis, abnormally thick, dystrophic nails and absent (hallux)       Left Foot Dermatologic   Skin: skin intact    Nails: onychomycosis, abnormally thick and dystrophic nails       Image:       RADIOLOGY/NUCLEAR:  No results found.    LABORATORY/CULTURE RESULTS:      PATHOLOGY RESULTS:       ASSESSMENT/PLAN     Diagnoses and all orders for this visit:    1. Onychomycosis (Primary)    2. Foot callus    3. Dystrophic nail    4. Foot drop,  right    5. Traumatic brain injury with loss of consciousness, sequela (CMS/[facility])    6. Type 2 diabetes mellitus with diabetic polyneuropathy, without long-term current use of insulin (CMS/[facility])      Comprehensive lower extremity examination and evaluation was performed.  Discussed findings and treatment plan including risks, benefits, and treatment options with patient in detail. Patient agreed with treatment plan.  After verbal consent obtained, nail(s) x9 debrided of length and thickness with nail nipper without incidence  After verbal consent obtained, calluses x1 pared utilizing dermal curette and/or scalpel without incidence  Patient may maintain nails and calluses at home utilizing emery board or pumice stone between visits as needed  Reviewed at home diabetic foot care including daily foot checks   Continue monitoring BG and control under direction of PCP.  Continue use of compression stockings and AFO  An After Visit Summary was printed and given to the patient at discharge, including (if requested) any available informative/educational handouts regarding diagnosis, treatment, or medications. All questions were answered to patient/family satisfaction. Should symptoms fail to improve or worsen they agree to call or return to clinic or to go to the Emergency Department. Discussed the importance of following up with any needed screening tests/labs/specialist appointments and any requested follow-up recommended by me today. Importance of maintaining follow-up discussed and patient accepts that missed appointments can delay diagnosis and potentially lead to worsening of conditions.  Return in about 3 months (around 5/9/2021)., or sooner if acute issues arise.          This document has been electronically signed by DEBRA Kraus on February 9, 2021 12:05 CST

## 2021-02-08 ENCOUNTER — TELEPHONE (OUTPATIENT)
Dept: PODIATRY | Facility: CLINIC | Age: 46
End: 2021-02-08

## 2021-02-09 ENCOUNTER — HOSPITAL ENCOUNTER (OUTPATIENT)
Dept: ULTRASOUND IMAGING | Age: 46
Discharge: HOME | End: 2021-02-09
Payer: MEDICAID

## 2021-02-09 ENCOUNTER — OFFICE VISIT (OUTPATIENT)
Dept: PODIATRY | Facility: CLINIC | Age: 46
End: 2021-02-09

## 2021-02-09 VITALS
SYSTOLIC BLOOD PRESSURE: 120 MMHG | WEIGHT: 216 LBS | OXYGEN SATURATION: 98 % | HEART RATE: 95 BPM | DIASTOLIC BLOOD PRESSURE: 80 MMHG | BODY MASS INDEX: 31.99 KG/M2 | HEIGHT: 69 IN

## 2021-02-09 DIAGNOSIS — R79.89 ELEVATED LIVER FUNCTION TESTS: ICD-10-CM

## 2021-02-09 DIAGNOSIS — B35.1 ONYCHOMYCOSIS: Primary | ICD-10-CM

## 2021-02-09 DIAGNOSIS — S06.9X9S TRAUMATIC BRAIN INJURY WITH LOSS OF CONSCIOUSNESS, SEQUELA (HCC): ICD-10-CM

## 2021-02-09 DIAGNOSIS — L84 FOOT CALLUS: ICD-10-CM

## 2021-02-09 DIAGNOSIS — L60.3 DYSTROPHIC NAIL: ICD-10-CM

## 2021-02-09 DIAGNOSIS — E11.42 TYPE 2 DIABETES MELLITUS WITH DIABETIC POLYNEUROPATHY, WITHOUT LONG-TERM CURRENT USE OF INSULIN (HCC): ICD-10-CM

## 2021-02-09 DIAGNOSIS — M21.371 FOOT DROP, RIGHT: ICD-10-CM

## 2021-02-09 PROBLEM — R29.6 FALLS FREQUENTLY: Status: ACTIVE | Noted: 2019-12-31

## 2021-02-09 PROBLEM — Z91.09 ALLERGY TO POLLEN: Status: ACTIVE | Noted: 2021-02-09

## 2021-02-09 PROBLEM — G89.21 CHRONIC PAIN DUE TO INJURY: Status: ACTIVE | Noted: 2021-02-09

## 2021-02-09 PROBLEM — E11.9 DIABETES MELLITUS (HCC): Status: ACTIVE | Noted: 2021-02-09

## 2021-02-09 PROBLEM — F17.200 TOBACCO DEPENDENCE SYNDROME: Status: ACTIVE | Noted: 2021-02-09

## 2021-02-09 PROBLEM — Z87.828 HISTORY OF HEAD INJURY: Status: ACTIVE | Noted: 2019-12-31

## 2021-02-09 PROBLEM — F41.1 GENERALIZED ANXIETY DISORDER: Status: ACTIVE | Noted: 2021-02-09

## 2021-02-09 PROBLEM — R26.9 ABNORMAL GAIT: Status: ACTIVE | Noted: 2021-02-09

## 2021-02-09 PROBLEM — J68.3 REACTIVE AIRWAYS DYSFUNCTION SYNDROME (HCC): Status: ACTIVE | Noted: 2021-02-09

## 2021-02-09 PROBLEM — N39.0 ACUTE URINARY TRACT INFECTION: Status: ACTIVE | Noted: 2021-02-09

## 2021-02-09 PROBLEM — I10 BENIGN ESSENTIAL HYPERTENSION: Status: ACTIVE | Noted: 2021-02-09

## 2021-02-09 PROBLEM — S06.9XAA TRAUMATIC BRAIN INJURY (HCC): Status: ACTIVE | Noted: 2018-03-14

## 2021-02-09 PROCEDURE — 11055 PARING/CUTG B9 HYPRKER LES 1: CPT | Performed by: NURSE PRACTITIONER

## 2021-02-09 PROCEDURE — 11721 DEBRIDE NAIL 6 OR MORE: CPT | Performed by: NURSE PRACTITIONER

## 2021-02-09 PROCEDURE — 76700 US EXAM ABDOM COMPLETE: CPT

## 2021-02-09 PROCEDURE — 99213 OFFICE O/P EST LOW 20 MIN: CPT | Performed by: NURSE PRACTITIONER

## 2021-03-15 ENCOUNTER — HOSPITAL ENCOUNTER (OUTPATIENT)
Dept: WOMENS IMAGING | Age: 46
Discharge: HOME | End: 2021-03-15
Payer: MEDICAID

## 2021-03-15 DIAGNOSIS — Z12.31 VISIT FOR SCREENING MAMMOGRAM: ICD-10-CM

## 2021-03-22 ENCOUNTER — LAB (OUTPATIENT)
Dept: LAB | Facility: HOSPITAL | Age: 46
End: 2021-03-22

## 2021-03-22 ENCOUNTER — TRANSCRIBE ORDERS (OUTPATIENT)
Dept: LAB | Facility: HOSPITAL | Age: 46
End: 2021-03-22

## 2021-03-22 DIAGNOSIS — Z01.818 PREOP TESTING: Primary | ICD-10-CM

## 2021-03-22 LAB — SARS-COV-2 ORF1AB RESP QL NAA+PROBE: NOT DETECTED

## 2021-03-22 PROCEDURE — C9803 HOPD COVID-19 SPEC COLLECT: HCPCS | Performed by: OPHTHALMOLOGY

## 2021-03-22 PROCEDURE — U0004 COV-19 TEST NON-CDC HGH THRU: HCPCS | Performed by: OPHTHALMOLOGY

## 2021-04-28 ENCOUNTER — TRANSCRIBE ORDERS (OUTPATIENT)
Dept: LAB | Facility: HOSPITAL | Age: 46
End: 2021-04-28

## 2021-04-28 DIAGNOSIS — Z01.818 PREOP TESTING: Primary | ICD-10-CM

## 2021-05-07 ENCOUNTER — LAB (OUTPATIENT)
Dept: LAB | Facility: HOSPITAL | Age: 46
End: 2021-05-07

## 2021-05-07 ENCOUNTER — TELEPHONE (OUTPATIENT)
Dept: VASCULAR SURGERY | Facility: CLINIC | Age: 46
End: 2021-05-07

## 2021-05-07 LAB — SARS-COV-2 ORF1AB RESP QL NAA+PROBE: NOT DETECTED

## 2021-05-07 PROCEDURE — C9803 HOPD COVID-19 SPEC COLLECT: HCPCS | Performed by: OPHTHALMOLOGY

## 2021-05-07 PROCEDURE — U0004 COV-19 TEST NON-CDC HGH THRU: HCPCS | Performed by: OPHTHALMOLOGY

## 2021-05-10 ENCOUNTER — OFFICE VISIT (OUTPATIENT)
Dept: PODIATRY | Facility: CLINIC | Age: 46
End: 2021-05-10

## 2021-05-10 VITALS
HEIGHT: 69 IN | OXYGEN SATURATION: 97 % | WEIGHT: 218 LBS | HEART RATE: 95 BPM | SYSTOLIC BLOOD PRESSURE: 124 MMHG | BODY MASS INDEX: 32.29 KG/M2 | DIASTOLIC BLOOD PRESSURE: 82 MMHG

## 2021-05-10 DIAGNOSIS — L84 FOOT CALLUS: ICD-10-CM

## 2021-05-10 DIAGNOSIS — E11.42 TYPE 2 DIABETES MELLITUS WITH DIABETIC POLYNEUROPATHY, WITHOUT LONG-TERM CURRENT USE OF INSULIN (HCC): ICD-10-CM

## 2021-05-10 DIAGNOSIS — M21.371 FOOT DROP, RIGHT: ICD-10-CM

## 2021-05-10 DIAGNOSIS — L60.3 DYSTROPHIC NAIL: ICD-10-CM

## 2021-05-10 DIAGNOSIS — S06.9X9S TRAUMATIC BRAIN INJURY WITH LOSS OF CONSCIOUSNESS, SEQUELA (HCC): ICD-10-CM

## 2021-05-10 DIAGNOSIS — B35.1 ONYCHOMYCOSIS: Primary | ICD-10-CM

## 2021-05-10 PROCEDURE — 11721 DEBRIDE NAIL 6 OR MORE: CPT | Performed by: NURSE PRACTITIONER

## 2021-05-10 PROCEDURE — 11055 PARING/CUTG B9 HYPRKER LES 1: CPT | Performed by: NURSE PRACTITIONER

## 2021-05-10 RX ORDER — BENZALKONIUM CHLORIDE 0.13 ML/ML
1 TINCTURE TOPICAL EVERY 12 HOURS SCHEDULED
COMMUNITY
End: 2023-04-05

## 2021-06-23 ENCOUNTER — HOSPITAL ENCOUNTER (OUTPATIENT)
Dept: WOMENS IMAGING | Age: 46
Discharge: HOME | End: 2021-06-23
Payer: MEDICAID

## 2021-06-23 PROCEDURE — 77067 SCR MAMMO BI INCL CAD: CPT

## 2021-06-28 ENCOUNTER — OFFICE VISIT (OUTPATIENT)
Dept: WOUND CARE | Facility: HOSPITAL | Age: 46
End: 2021-06-28

## 2021-06-28 ENCOUNTER — LAB REQUISITION (OUTPATIENT)
Dept: LAB | Facility: HOSPITAL | Age: 46
End: 2021-06-28

## 2021-06-28 DIAGNOSIS — L97.509 TYPE 2 DIABETES MELLITUS WITH FOOT ULCER, UNSPECIFIED WHETHER LONG TERM INSULIN USE (HCC): ICD-10-CM

## 2021-06-28 DIAGNOSIS — L97.512 NON-PRESSURE CHRONIC ULCER OF OTHER PART OF RIGHT FOOT WITH FAT LAYER EXPOSED (HCC): ICD-10-CM

## 2021-06-28 DIAGNOSIS — Z87.820 PERSONAL HISTORY OF TRAUMATIC BRAIN INJURY: ICD-10-CM

## 2021-06-28 DIAGNOSIS — E11.621 TYPE 2 DIABETES MELLITUS WITH FOOT ULCER, UNSPECIFIED WHETHER LONG TERM INSULIN USE (HCC): ICD-10-CM

## 2021-06-28 DIAGNOSIS — Z00.00 ENCOUNTER FOR GENERAL ADULT MEDICAL EXAMINATION WITHOUT ABNORMAL FINDINGS: ICD-10-CM

## 2021-06-28 DIAGNOSIS — L03.031 CELLULITIS OF RIGHT TOE: ICD-10-CM

## 2021-06-28 PROCEDURE — G0463 HOSPITAL OUTPT CLINIC VISIT: HCPCS

## 2021-06-28 PROCEDURE — 99203 OFFICE O/P NEW LOW 30 MIN: CPT | Performed by: PODIATRIST

## 2021-06-28 PROCEDURE — 87205 SMEAR GRAM STAIN: CPT | Performed by: PODIATRIST

## 2021-06-28 PROCEDURE — 11042 DBRDMT SUBQ TIS 1ST 20SQCM/<: CPT | Performed by: PODIATRIST

## 2021-06-28 PROCEDURE — 87070 CULTURE OTHR SPECIMN AEROBIC: CPT | Performed by: PODIATRIST

## 2021-07-01 LAB
BACTERIA SPEC AEROBE CULT: NORMAL
GRAM STN SPEC: NORMAL
GRAM STN SPEC: NORMAL

## 2021-07-03 ENCOUNTER — HOSPITAL ENCOUNTER (EMERGENCY)
Facility: HOSPITAL | Age: 46
Discharge: NURSING, REHAB OR LONG-TERM CARE | End: 2021-07-03
Attending: INTERNAL MEDICINE | Admitting: INTERNAL MEDICINE

## 2021-07-03 ENCOUNTER — APPOINTMENT (OUTPATIENT)
Dept: CT IMAGING | Facility: HOSPITAL | Age: 46
End: 2021-07-03

## 2021-07-03 ENCOUNTER — APPOINTMENT (OUTPATIENT)
Dept: GENERAL RADIOLOGY | Facility: HOSPITAL | Age: 46
End: 2021-07-03

## 2021-07-03 VITALS
DIASTOLIC BLOOD PRESSURE: 78 MMHG | OXYGEN SATURATION: 98 % | RESPIRATION RATE: 20 BRPM | SYSTOLIC BLOOD PRESSURE: 132 MMHG | BODY MASS INDEX: 31.55 KG/M2 | TEMPERATURE: 98.1 F | HEIGHT: 69 IN | HEART RATE: 76 BPM | WEIGHT: 213 LBS

## 2021-07-03 DIAGNOSIS — S60.00XA CONTUSION OF FINGER OF RIGHT HAND, INITIAL ENCOUNTER: Primary | ICD-10-CM

## 2021-07-03 DIAGNOSIS — S00.03XA SCALP HEMATOMA, INITIAL ENCOUNTER: ICD-10-CM

## 2021-07-03 PROCEDURE — 73120 X-RAY EXAM OF HAND: CPT

## 2021-07-03 PROCEDURE — 70486 CT MAXILLOFACIAL W/O DYE: CPT

## 2021-07-03 PROCEDURE — 99282 EMERGENCY DEPT VISIT SF MDM: CPT

## 2021-07-04 NOTE — ED PROVIDER NOTES
Subjective   Patient is a 45-year-old female with history of polysubstance abuse who resides at [facility] who was ambulating got her foot hung up on the ground and fell on her right hand and hit her face on the ground.  She did not pass out blackout or lose consciousness though she states that she does have some swelling of her scalp.  And that her hand hurts on the posterior aspect where she scraped it.  She has no active bleeding and is otherwise doing well.  She has good range of motion throughout all extremities and neurologically she is at her baseline per both the patient and the representative [facility] who is here with her.          Review of Systems   Constitutional: Negative for chills, fatigue and fever.   HENT: Negative for congestion and facial swelling.    Eyes: Negative for photophobia, discharge and visual disturbance.   Respiratory: Negative for cough, shortness of breath and wheezing.    Cardiovascular: Negative for chest pain, palpitations and leg swelling.   Gastrointestinal: Negative for abdominal pain, diarrhea, nausea and vomiting.   Endocrine: Negative for cold intolerance and heat intolerance.   Genitourinary: Negative for difficulty urinating and urgency.   Musculoskeletal: Positive for arthralgias. Negative for joint swelling and myalgias.   Skin: Positive for wound. Negative for color change and pallor.   Neurological: Negative for dizziness and light-headedness.   Hematological: Negative for adenopathy. Does not bruise/bleed easily.   Psychiatric/Behavioral: Negative for agitation, behavioral problems and confusion.       Past Medical History:   Diagnosis Date   • Anxiety    • Asthma    • Chronic back pain    • Chronic hip pain    • Depression    • Diabetes mellitus (CMS/[facility])    • Edema of both legs    • Hypertension    • Restless leg syndrome    • Substance abuse in remission (CMS/[facility])    • Traumatic brain injury (CMS/[facility])        Allergies   Allergen Reactions   •  Penicillins Other (See Comments)     As a child   • Tramadol Nausea And Vomiting       Past Surgical History:   Procedure Laterality Date   • BACK SURGERY      L5-L6 surgery   •  SECTION     • LEG SURGERY      Left leg jose ramon placement   • PEG TUBE INSERTION     • PEG TUBE REMOVAL     • TRACHEOSTOMY      with reversal   • TUBAL ABDOMINAL LIGATION         Family History   Problem Relation Age of Onset   • Heart murmur Mother    • Heart disease Father    • No Known Problems Brother    • No Known Problems Sister    • No Known Problems Brother        Social History     Socioeconomic History   • Marital status: Single     Spouse name: Not on file   • Number of children: Not on file   • Years of education: Not on file   • Highest education level: Not on file   Tobacco Use   • Smoking status: Current Every Day Smoker     Packs/day: 0.00     Types: Electronic Cigarette   • Smokeless tobacco: Never Used   Vaping Use   • Vaping Use: Never used   Substance and Sexual Activity   • Alcohol use: No   • Drug use: No     Comment: history of, narcotics   • Sexual activity: Never     Partners: Male     Birth control/protection: Injection     Comment: last act of intercourse a year ago           Objective   Physical Exam  Vitals and nursing note reviewed.   Constitutional:       Appearance: Normal appearance. She is well-developed.   HENT:      Head: Normocephalic.      Comments: Swelling and ecchymosis to the forehead     Nose: Nose normal.   Eyes:      Conjunctiva/sclera: Conjunctivae normal.      Pupils: Pupils are equal, round, and reactive to light.   Cardiovascular:      Rate and Rhythm: Normal rate and regular rhythm.      Heart sounds: Normal heart sounds.   Pulmonary:      Effort: Pulmonary effort is normal.      Breath sounds: Normal breath sounds.   Abdominal:      General: Bowel sounds are normal. There is no distension.      Palpations: Abdomen is soft.   Musculoskeletal:         General: Normal range of motion.       Cervical back: Normal range of motion and neck supple.   Skin:     General: Skin is warm and dry.      Comments: Patient has a couple of abrasions to the posterior aspect of the right hand.   Neurological:      General: No focal deficit present.      Mental Status: She is alert and oriented to person, place, and time.      Cranial Nerves: No cranial nerve deficit.   Psychiatric:         Mood and Affect: Mood normal.         Behavior: Behavior normal.         Thought Content: Thought content normal.         Procedures           ED Course                                           MDM    Final diagnoses:   Contusion of finger of right hand, initial encounter   Scalp hematoma, initial encounter       ED Disposition  ED Disposition     ED Disposition Condition Comment    Discharge Stable           Judith Mauro MD  [address] [place] [address] [address] [place] [address]  [phone]    In 3 days           Medication List      No changes were made to your prescriptions during this visit.          Zach Powell MD  07/03/21 1117

## 2021-07-04 NOTE — ED NOTES
Pt refuses to provide urine sample for pregnancy test at this time. Pt reports that she knows she is not pregnant.      Maryuri Daley RN  07/03/21 6388

## 2021-07-06 ENCOUNTER — OFFICE VISIT (OUTPATIENT)
Dept: WOUND CARE | Facility: HOSPITAL | Age: 46
End: 2021-07-06

## 2021-07-06 DIAGNOSIS — L03.031 CELLULITIS OF RIGHT TOE: ICD-10-CM

## 2021-07-06 DIAGNOSIS — E11.621 TYPE 2 DIABETES MELLITUS WITH FOOT ULCER, UNSPECIFIED WHETHER LONG TERM INSULIN USE (HCC): ICD-10-CM

## 2021-07-06 DIAGNOSIS — L97.512 NON-PRESSURE CHRONIC ULCER OF OTHER PART OF RIGHT FOOT WITH FAT LAYER EXPOSED (HCC): ICD-10-CM

## 2021-07-06 DIAGNOSIS — Z87.820 PERSONAL HISTORY OF TRAUMATIC BRAIN INJURY: ICD-10-CM

## 2021-07-06 DIAGNOSIS — L97.509 TYPE 2 DIABETES MELLITUS WITH FOOT ULCER, UNSPECIFIED WHETHER LONG TERM INSULIN USE (HCC): ICD-10-CM

## 2021-07-06 PROCEDURE — 99213 OFFICE O/P EST LOW 20 MIN: CPT | Performed by: SURGERY

## 2021-07-06 PROCEDURE — G0463 HOSPITAL OUTPT CLINIC VISIT: HCPCS

## 2021-08-06 ENCOUNTER — TELEPHONE (OUTPATIENT)
Dept: PODIATRY | Facility: CLINIC | Age: 46
End: 2021-08-06

## 2021-08-18 LAB
ALBUMIN SERPL-MCNC: 4.1 G/DL (ref 3.5–5.2)
ALP BLD-CCNC: 123 U/L (ref 35–104)
ALT SERPL-CCNC: 28 U/L (ref 5–33)
ANION GAP SERPL CALCULATED.3IONS-SCNC: 10 MMOL/L (ref 7–19)
AST SERPL-CCNC: 22 U/L (ref 5–32)
BASOPHILS ABSOLUTE: 0.1 K/UL (ref 0–0.2)
BASOPHILS RELATIVE PERCENT: 0.7 % (ref 0–1)
BILIRUB SERPL-MCNC: 0.4 MG/DL (ref 0.2–1.2)
BUN BLDV-MCNC: 14 MG/DL (ref 6–20)
CALCIUM SERPL-MCNC: 8.8 MG/DL (ref 8.6–10)
CHLORIDE BLD-SCNC: 102 MMOL/L (ref 98–111)
CHOLESTEROL, FASTING: 141 MG/DL (ref 160–199)
CO2: 27 MMOL/L (ref 22–29)
CREAT SERPL-MCNC: 0.7 MG/DL (ref 0.5–0.9)
CREATININE URINE: 80.3 MG/DL (ref 4.2–622)
EOSINOPHILS ABSOLUTE: 0.1 K/UL (ref 0–0.6)
EOSINOPHILS RELATIVE PERCENT: 1.4 % (ref 0–5)
GFR AFRICAN AMERICAN: >59
GFR NON-AFRICAN AMERICAN: >60
GLUCOSE BLD-MCNC: 142 MG/DL (ref 74–109)
HBA1C MFR BLD: 7 % (ref 4–6)
HCT VFR BLD CALC: 37.9 % (ref 37–47)
HDLC SERPL-MCNC: 23 MG/DL (ref 65–121)
HEMOGLOBIN: 11.6 G/DL (ref 12–16)
IMMATURE GRANULOCYTES #: 0 K/UL
LDL CHOLESTEROL CALCULATED: 70 MG/DL
LYMPHOCYTES ABSOLUTE: 1.5 K/UL (ref 1.1–4.5)
LYMPHOCYTES RELATIVE PERCENT: 22.2 % (ref 20–40)
MCH RBC QN AUTO: 27.8 PG (ref 27–31)
MCHC RBC AUTO-ENTMCNC: 30.6 G/DL (ref 33–37)
MCV RBC AUTO: 90.9 FL (ref 81–99)
MICROALBUMIN UR-MCNC: <1.2 MG/DL (ref 0–19)
MICROALBUMIN/CREAT UR-RTO: NORMAL MG/G
MONOCYTES ABSOLUTE: 0.5 K/UL (ref 0–0.9)
MONOCYTES RELATIVE PERCENT: 6.8 % (ref 0–10)
NEUTROPHILS ABSOLUTE: 4.8 K/UL (ref 1.5–7.5)
NEUTROPHILS RELATIVE PERCENT: 68.3 % (ref 50–65)
PDW BLD-RTO: 12.7 % (ref 11.5–14.5)
PLATELET # BLD: 274 K/UL (ref 130–400)
PMV BLD AUTO: 9.6 FL (ref 9.4–12.3)
POTASSIUM SERPL-SCNC: 4.2 MMOL/L (ref 3.5–5)
RBC # BLD: 4.17 M/UL (ref 4.2–5.4)
SODIUM BLD-SCNC: 139 MMOL/L (ref 136–145)
TOTAL PROTEIN: 7.5 G/DL (ref 6.6–8.7)
TRIGLYCERIDE, FASTING: 241 MG/DL (ref 0–149)
WBC # BLD: 7 K/UL (ref 4.8–10.8)

## 2021-08-20 NOTE — PROGRESS NOTES
[facility] - PODIATRY    Today's Date: 21    Patient Name: Zahida Geller  MRN: [id number]  CSN: [id number]  PCP: Judith Mauro MD  Referring Provider: No ref. provider found    SUBJECTIVE     Chief Complaint   Patient presents with   • Follow-up     pcp 10/03/2020  3 month follow up diabetic foot and nail care- pt state feet doing ok- pt denies pain- pt presents with long nails   • Diabetes     140mg/dl BG this am     HPI: Zahida Geller, a 45 y.o.female, comes to clinic as a(n) established patient presenting for diabetic foot exam, complaining of painful toenails and callus. Patient has h/o anxiety, asthma, back pain, depression, DM2, edema, HTN, TBI, previous substance abuse.  Patient continues to work on calluses at home between visits and feel like this is improving symptoms.  Due to neuropathy and nail changes she is uncomfortable cutting her own nails.  Continues use of compression stockings and AFO. Patient is NIDDM with last stated BG level of 140mg/dl.  Denies pain at the present time. Denies any constitutional symptoms. No other pedal complaints at this time.    Past Medical History:   Diagnosis Date   • Anxiety    • Asthma    • Chronic back pain    • Chronic hip pain    • Depression    • Diabetes mellitus (CMS/HCC)    • Edema of both legs    • Hypertension    • Restless leg syndrome    • Substance abuse in remission (CMS/HCC)    • Traumatic brain injury (CMS/HCC)      Past Surgical History:   Procedure Laterality Date   • BACK SURGERY      L5-L6 surgery   •  SECTION     • LEG SURGERY      Left leg jose ramon placement   • PEG TUBE INSERTION     • PEG TUBE REMOVAL     • TRACHEOSTOMY      with reversal   • TUBAL ABDOMINAL LIGATION       Family History   Problem Relation Age of Onset   • Heart murmur Mother    • Heart disease Father    • No Known Problems Brother    • No Known Problems Sister    • No Known Problems Brother      Social History     Socioeconomic History   •  "Marital status: Single     Spouse name: Not on file   • Number of children: Not on file   • Years of education: Not on file   • Highest education level: Not on file   Tobacco Use   • Smoking status: Former Smoker     Packs/day: 0.00     Types: Electronic Cigarette   • Smokeless tobacco: Never Used   Vaping Use   • Vaping Use: Former   Substance and Sexual Activity   • Alcohol use: No   • Drug use: No     Comment: history of, narcotics   • Sexual activity: Never     Partners: Male     Birth control/protection: Injection     Comment: last act of intercourse a year ago     Allergies   Allergen Reactions   • Penicillins Other (See Comments)     As a child   • Tramadol Nausea And Vomiting     Current Outpatient Medications   Medication Sig Dispense Refill   • albuterol (PROVENTIL HFA;VENTOLIN HFA) 108 (90 Base) MCG/ACT inhaler Inhale.     • amitriptyline (ELAVIL) 50 MG tablet Take  by mouth.     • Blood Glucose Monitoring Suppl (FREESTYLE FREEDOM LITE) w/Device kit      • budesonide-formoterol (SYMBICORT) 80-4.5 MCG/ACT inhaler Inhale.     • calcium carbonate (TUMS) 500 MG chewable tablet Chew 1 tablet Daily.     • Cold Sore Products ([facility] Cold Sore Treatment) 0.13 % liquid 1 g Every 12 (Twelve) Hours.     • esomeprazole (NEXIUM) 40 MG packet Take 40 mg by mouth.     • fluticasone (FLONASE) 50 MCG/ACT nasal spray into the nostril(s) as directed by provider.     • furosemide (LASIX) 40 MG tablet Take 40 mg by mouth.     • ibuprofen (ADVIL,MOTRIN) 800 MG tablet Take 800 mg by mouth.     • Insulin Syringe-Needle U-100 30G X 5/16\" 0.5 ML misc      • lidocaine (LIDODERM) 5 % Place 1 patch on the skin Daily As Needed for Moderate Pain (4-6). Remove & Discard patch within 12 hours or as directed by MD     • loratadine (CLARITIN) 10 MG tablet Take 10 mg by mouth.     • medroxyPROGESTERone (DEPO-PROVERA) 150 MG/ML injection Inject 150 mg into the appropriate muscle as directed by prescriber.     • MEDROXYPROGESTERONE ACE-LIDO IM " Inject  into the appropriate muscle as directed by prescriber.     • metFORMIN (GLUCOPHAGE) 500 MG tablet Take 850 mg by mouth.     • metoprolol tartrate (LOPRESSOR) 100 MG tablet Take 100 mg by mouth 2 (Two) Times a Day.     • Multiple Vitamins-Minerals (THERA-M MULTIPLE VITAMINS PO) Take  by mouth.     • nystatin-triamcinolone (MYCOLOG II) 831765-4.1 UNIT/GM-% cream Apply  topically to the appropriate area as directed 2 (Two) Times a Day.     • oxyCODONE-acetaminophen (PERCOCET)  MG per tablet Take 1 tablet by mouth.     • PARoxetine (PAXIL) 30 MG tablet Take 30 mg by mouth.     • phenol (SORE THROAT SPRAY) 1.4 % liquid liquid Apply  to the mouth or throat As Needed.     • SITagliptin (JANUVIA) 100 MG tablet Take 100 mg by mouth Daily.     • tiZANidine (ZANAFLEX) 4 MG tablet Take 4 mg by mouth.       No current facility-administered medications for this visit.     Review of Systems   Constitutional: Negative for chills and fever.   HENT: Negative for congestion.    Respiratory: Negative for shortness of breath.    Cardiovascular: Positive for leg swelling. Negative for chest pain.   Gastrointestinal: Negative for constipation, diarrhea, nausea and vomiting.   Musculoskeletal: Positive for gait problem.   Skin: Negative for wound.   Neurological: Positive for numbness.       OBJECTIVE     Vitals:    08/27/21 1128   BP: 122/60   Pulse: 88   SpO2: 99%       PHYSICAL EXAM  Accompanied by transporter.    Foot/Ankle Exam:       General:   Appearance: appears stated age and healthy    Orientation: AAOx3    Affect: appropriate    Gait: antalgic    Assistance: independent    Shoe Gear:  Casual shoes (compression stockings)    VASCULAR      Right Foot Vascularity   Dorsalis pedis:  2+  Posterior tibial:  2+  Skin Temperature: warm    Edema Grading:  Trace and non-pitting  CFT:  < 3 seconds  Pedal Hair Growth:  Present  Varicosities: mild varicosities       Left Foot Vascularity   Dorsalis pedis:  2+  Posterior tibial:   2+  Skin Temperature: warm    Edema Gradin+, non-pitting and trace  CFT:  < 3 seconds  Pedal Hair Growth:  Present  Varicosities: mild varicosities        NEUROLOGIC     Right Foot Neurologic   Light touch sensation:  Diminished  Vibratory sensation:  Diminished  Hot/Cold sensation: diminished    Protective Sensation using [place]-Lu[place] Monofilament:  7     Left Foot Neurologic   Light touch sensation:  Diminished  Vibratory sensation:  Diminished  Hot/cold sensation: diminished    Protective Sensation using [place] Monofilament:  7     MUSCULOSKELETAL      Right Foot Musculoskeletal   Ecchymosis:  None  Tenderness: none    Arch:  Normal  Hallux valgus: No    Hallux limitus: No       Left Foot Musculoskeletal   Ecchymosis:  None  Tenderness: none    Arch:  Normal  Hallux valgus: No    Hallux limitus: No       MUSCLE STRENGTH     Right Foot Muscle Strength   Foot dorsiflexion:  3  Foot plantar flexion:  5  Foot inversion:  5  Foot eversion:  5     Left Foot Muscle Strength   Foot dorsiflexion:  5  Foot plantar flexion:  5  Foot inversion:  5  Foot eversion:  5     RANGE OF MOTION      Right Foot Range of Motion   Foot and ankle ROM within normal limits       Left Foot Range of Motion   Foot and ankle ROM within normal limits       DERMATOLOGIC     Right Foot Dermatologic   Skin: corn    Nails: onychomycosis, abnormally thick, dystrophic nails and absent (hallux)       Left Foot Dermatologic   Skin: skin intact    Nails: onychomycosis, abnormally thick and dystrophic nails       Image:       RADIOLOGY/NUCLEAR:  No results found.    LABORATORY/CULTURE RESULTS:      PATHOLOGY RESULTS:       ASSESSMENT/PLAN     Diagnoses and all orders for this visit:    1. Onychomycosis (Primary)    2. Foot callus    3. Dystrophic nail    4. Foot drop, right    5. Traumatic brain injury with loss of consciousness, sequela (CMS/HCC)      Comprehensive lower extremity examination and evaluation was performed.  Discussed  findings and treatment plan including risks, benefits, and treatment options with patient in detail. Patient agreed with treatment plan.  After verbal consent obtained, nail(s) x9 debrided of length and thickness with nail nipper without incidence  After verbal consent obtained, calluses x1 pared utilizing dermal curette and/or scalpel without incidence  Patient may maintain nails and calluses at home utilizing emery board or pumice stone between visits as needed  Reviewed at home diabetic foot care including daily foot checks   Continue monitoring BG and control under direction of PCP.  Continue compression stockings and elevation of lower extremities.   An After Visit Summary was printed and given to the patient at discharge, including (if requested) any available informative/educational handouts regarding diagnosis, treatment, or medications. All questions were answered to patient/family satisfaction. Should symptoms fail to improve or worsen they agree to call or return to clinic or to go to the Emergency Department. Discussed the importance of following up with any needed screening tests/labs/specialist appointments and any requested follow-up recommended by me today. Importance of maintaining follow-up discussed and patient accepts that missed appointments can delay diagnosis and potentially lead to worsening of conditions.  Return in about 3 months (around 11/27/2021)., or sooner if acute issues arise.          This document has been electronically signed by DEBRA Kraus on August 27, 2021 15:08 CDT

## 2021-08-26 ENCOUNTER — TELEPHONE (OUTPATIENT)
Dept: PODIATRY | Facility: CLINIC | Age: 46
End: 2021-08-26

## 2021-08-27 ENCOUNTER — OFFICE VISIT (OUTPATIENT)
Dept: PODIATRY | Facility: CLINIC | Age: 46
End: 2021-08-27

## 2021-08-27 VITALS
HEIGHT: 69 IN | HEART RATE: 88 BPM | BODY MASS INDEX: 32.23 KG/M2 | OXYGEN SATURATION: 99 % | WEIGHT: 217.6 LBS | SYSTOLIC BLOOD PRESSURE: 122 MMHG | DIASTOLIC BLOOD PRESSURE: 60 MMHG

## 2021-08-27 DIAGNOSIS — B35.1 ONYCHOMYCOSIS: Primary | ICD-10-CM

## 2021-08-27 DIAGNOSIS — M21.371 FOOT DROP, RIGHT: ICD-10-CM

## 2021-08-27 DIAGNOSIS — L60.3 DYSTROPHIC NAIL: ICD-10-CM

## 2021-08-27 DIAGNOSIS — S06.9X9S TRAUMATIC BRAIN INJURY WITH LOSS OF CONSCIOUSNESS, SEQUELA (HCC): ICD-10-CM

## 2021-08-27 DIAGNOSIS — L84 FOOT CALLUS: ICD-10-CM

## 2021-08-27 PROCEDURE — 11055 PARING/CUTG B9 HYPRKER LES 1: CPT | Performed by: NURSE PRACTITIONER

## 2021-08-27 PROCEDURE — 11721 DEBRIDE NAIL 6 OR MORE: CPT | Performed by: NURSE PRACTITIONER

## 2021-09-28 ENCOUNTER — OFFICE VISIT (OUTPATIENT)
Dept: OBSTETRICS AND GYNECOLOGY | Facility: CLINIC | Age: 46
End: 2021-09-28

## 2021-09-28 VITALS
WEIGHT: 212 LBS | SYSTOLIC BLOOD PRESSURE: 106 MMHG | DIASTOLIC BLOOD PRESSURE: 68 MMHG | HEIGHT: 69 IN | BODY MASS INDEX: 31.4 KG/M2

## 2021-09-28 DIAGNOSIS — Z12.31 ENCOUNTER FOR SCREENING MAMMOGRAM FOR MALIGNANT NEOPLASM OF BREAST: ICD-10-CM

## 2021-09-28 DIAGNOSIS — Z87.891 FORMER SMOKER: ICD-10-CM

## 2021-09-28 DIAGNOSIS — Z01.419 ENCOUNTER FOR GYNECOLOGICAL EXAMINATION WITHOUT ABNORMAL FINDING: Primary | ICD-10-CM

## 2021-09-28 PROCEDURE — 87481 CANDIDA DNA AMP PROBE: CPT | Performed by: NURSE PRACTITIONER

## 2021-09-28 PROCEDURE — 87512 GARDNER VAG DNA QUANT: CPT | Performed by: NURSE PRACTITIONER

## 2021-09-28 PROCEDURE — G0123 SCREEN CERV/VAG THIN LAYER: HCPCS | Performed by: NURSE PRACTITIONER

## 2021-09-28 PROCEDURE — 87798 DETECT AGENT NOS DNA AMP: CPT | Performed by: NURSE PRACTITIONER

## 2021-09-28 PROCEDURE — 87563 M. GENITALIUM AMP PROBE: CPT | Performed by: NURSE PRACTITIONER

## 2021-09-28 PROCEDURE — 87624 HPV HI-RISK TYP POOLED RSLT: CPT | Performed by: NURSE PRACTITIONER

## 2021-09-28 PROCEDURE — 87591 N.GONORRHOEAE DNA AMP PROB: CPT | Performed by: NURSE PRACTITIONER

## 2021-09-28 PROCEDURE — 87491 CHLMYD TRACH DNA AMP PROBE: CPT | Performed by: NURSE PRACTITIONER

## 2021-09-28 PROCEDURE — 99396 PREV VISIT EST AGE 40-64: CPT | Performed by: NURSE PRACTITIONER

## 2021-09-28 PROCEDURE — 87661 TRICHOMONAS VAGINALIS AMPLIF: CPT | Performed by: NURSE PRACTITIONER

## 2021-09-28 PROCEDURE — 87625 HPV TYPES 16 & 18 ONLY: CPT | Performed by: NURSE PRACTITIONER

## 2021-09-28 RX ORDER — DOCOSANOL 100 MG/G
1 CREAM TOPICAL
COMMUNITY

## 2021-09-28 RX ORDER — GUAIFENESIN 400 MG/1
400 TABLET ORAL EVERY 4 HOURS PRN
COMMUNITY

## 2021-09-28 RX ORDER — PREGABALIN 100 MG/1
100 CAPSULE ORAL 3 TIMES DAILY
COMMUNITY

## 2021-09-28 RX ORDER — GUAIFENESIN AND DEXTROMETHORPHAN HYDROBROMIDE 100; 10 MG/5ML; MG/5ML
10 SOLUTION ORAL EVERY 4 HOURS PRN
COMMUNITY

## 2021-09-28 RX ORDER — DOCUSATE SODIUM 100 MG/1
200 CAPSULE, LIQUID FILLED ORAL DAILY PRN
COMMUNITY

## 2021-09-28 RX ORDER — PROPRANOLOL HYDROCHLORIDE 20 MG/1
20 TABLET ORAL 3 TIMES DAILY
COMMUNITY

## 2021-09-29 LAB
C TRACH RRNA CVX QL NAA+PROBE: NOT DETECTED
N GONORRHOEA RRNA SPEC QL NAA+PROBE: NOT DETECTED
TRICHOMONAS VAGINALIS PCR: NOT DETECTED

## 2021-09-30 ENCOUNTER — TELEPHONE (OUTPATIENT)
Dept: OBSTETRICS AND GYNECOLOGY | Facility: CLINIC | Age: 46
End: 2021-09-30

## 2021-09-30 ENCOUNTER — TRANSCRIBE ORDERS (OUTPATIENT)
Dept: LAB | Facility: HOSPITAL | Age: 46
End: 2021-09-30

## 2021-09-30 DIAGNOSIS — Z01.818 PREOP TESTING: Primary | ICD-10-CM

## 2021-09-30 LAB
HPV I/H RISK 4 DNA CVX QL PROBE+SIG AMP: DETECTED
HPV16 DNA SPEC QL NAA+PROBE: NOT DETECTED
HPV18+45 E6+E7 MRNA CVX QL NAA+PROBE: NOT DETECTED

## 2021-09-30 NOTE — TELEPHONE ENCOUNTER
Pt's representative from [facility] called and informed us that the pt had a mammogram done on 06/23/2021at [facility]. She was informed she could cancel the mammogram and [facility].

## 2021-10-04 LAB
GEN CATEG CVX/VAG CYTO-IMP: NORMAL
LAB AP CASE REPORT: NORMAL
LAB AP GYN ADDITIONAL INFORMATION: NORMAL
LAB AP GYN OTHER FINDINGS: NORMAL
PATH INTERP SPEC-IMP: NORMAL
STAT OF ADQ CVX/VAG CYTO-IMP: NORMAL

## 2021-10-08 ENCOUNTER — APPOINTMENT (OUTPATIENT)
Dept: MAMMOGRAPHY | Facility: HOSPITAL | Age: 46
End: 2021-10-08

## 2021-10-09 ENCOUNTER — LAB (OUTPATIENT)
Dept: LAB | Facility: HOSPITAL | Age: 46
End: 2021-10-09

## 2021-10-09 LAB — SARS-COV-2 ORF1AB RESP QL NAA+PROBE: NOT DETECTED

## 2021-10-09 PROCEDURE — C9803 HOPD COVID-19 SPEC COLLECT: HCPCS | Performed by: OPHTHALMOLOGY

## 2021-10-09 PROCEDURE — U0004 COV-19 TEST NON-CDC HGH THRU: HCPCS | Performed by: OPHTHALMOLOGY

## 2021-10-17 NOTE — PATIENT INSTRUCTIONS
"BMI for Adults  What is BMI?  Body mass index (BMI) is a number that is calculated from a person's weight and height. BMI can help estimate how much of a person's weight is composed of fat. BMI does not measure body fat directly. Rather, it is an alternative to procedures that directly measure body fat, which can be difficult and expensive.  BMI can help identify people who may be at higher risk for certain medical problems.  What are BMI measurements used for?  BMI is used as a screening tool to identify possible weight problems. It helps determine whether a person is obese, overweight, a healthy weight, or underweight.  BMI is useful for:  · Identifying a weight problem that may be related to a medical condition or may increase the risk for medical problems.  · Promoting changes, such as changes in diet and exercise, to help reach a healthy weight. BMI screening can be repeated to see if these changes are working.  How is BMI calculated?  BMI involves measuring your weight in relation to your height. Both height and weight are measured, and the BMI is calculated from those numbers. This can be done either in English (U.S[place] or metric measurements. Note that charts and online BMI calculators are available to help you find your BMI quickly and easily without having to do these calculations yourself.  To calculate your BMI in English (U.S[place] measurements:    1. Measure your weight in pounds (lb).  2. Multiply the number of pounds by 703.  ? For example, for a person who weighs 180 lb, multiply that number by 703, which equals 126,540.  3. Measure your height in inches. Then multiply that number by itself to get a measurement called \"inches squared.\"  ? For example, for a person who is 70 inches tall, the \"inches squared\" measurement is 70 inches x 70 inches, which equals 4,900 inches squared.  4. Divide the total from step 2 (number of lb x 703) by the total from step 3 (inches squared): 126,540 ÷ 4,900 = 25.8. This is " "your BMI.    To calculate your BMI in metric measurements:  1. Measure your weight in kilograms (kg).  2. Measure your height in meters (m). Then multiply that number by itself to get a measurement called \"meters squared.\"  ? For example, for a person who is 1.75 m tall, the \"meters squared\" measurement is 1.75 m x 1.75 m, which is equal to 3.1 meters squared.  3. Divide the number of kilograms (your weight) by the meters squared number. In this example: 70 ÷ 3.1 = 22.6. This is your BMI.  What do the results mean?  BMI charts are used to identify whether you are underweight, normal weight, overweight, or obese. The following guidelines will be used:  · Underweight: BMI less than 18.5.  · Normal weight: BMI between 18.5 and 24.9.  · Overweight: BMI between 25 and 29.9.  · Obese: BMI of 30 or above.  Keep these notes in mind:  · Weight includes both fat and muscle, so someone with a muscular build, such as an athlete, may have a BMI that is higher than 24.9. In cases like these, BMI is not an accurate measure of body fat.  · To determine if excess body fat is the cause of a BMI of 25 or higher, further assessments may need to be done by a health care provider.  · BMI is usually interpreted in the same way for men and women.  Where to find more information  For more information about BMI, including tools to quickly calculate your BMI, go to these websites:  · Centers for Disease Control and Prevention: www.cdc.gov  · American Heart Association: www.heart.org  · National Heart, Lung, and Blood [place]: www.nhlbi.nih.gov  Summary  · Body mass index (BMI) is a number that is calculated from a person's weight and height.  · BMI may help estimate how much of a person's weight is composed of fat. BMI can help identify those who may be at higher risk for certain medical problems.  · BMI can be measured using English measurements or metric measurements.  · BMI charts are used to identify whether you are underweight, normal " weight, overweight, or obese.  This information is not intended to replace advice given to you by your health care provider. Make sure you discuss any questions you have with your health care provider.  Document Revised: 09/09/2020 Document Reviewed: 07/17/2020  Elsevier Patient Education © 2021 [employer]

## 2021-11-17 NOTE — PROGRESS NOTES
[facility] - PODIATRY    Today's Date: 21    Patient Name: Zahida Geller  MRN: [id number]  CSN: [id number]  PCP: Judith Mauro MD  Referring Provider: No ref. provider found    SUBJECTIVE     Chief Complaint   Patient presents with   • Follow-up     3 month f/u diabetic foot/nail care.  Pt states she doesn't have any issues.  PCP-21.   • Diabetes     Last blood sugar reading is 157 mg/dl.     HPI: Zahida Geller, a 46 y.o.female, comes to clinic as a(n) established patient presenting for diabetic foot exam, complaining of painful toenails and callus. Patient has h/o anxiety, asthma, back pain, depression, DM2, edema, HTN, TBI, previous substance abuse.  Patient continues to work on calluses at home between visits and feel like this is improving symptoms.  Due to neuropathy and nail changes she is uncomfortable cutting her own nails.  Continues use of compression stockings and AFO. Patient is NIDDM with last stated BG level of 157mg/dl.  Denies pain today. Denies any constitutional symptoms. No other pedal complaints at this time.    Past Medical History:   Diagnosis Date   • Anxiety    • Asthma    • Chronic back pain    • Chronic hip pain    • Depression    • Diabetes mellitus (HCC)    • Edema of both legs    • Hypertension    • Restless leg syndrome    • Substance abuse in remission (HCC)    • Traumatic brain injury (HCC)      Past Surgical History:   Procedure Laterality Date   • BACK SURGERY      L5-L6 surgery   •  SECTION     • LEG SURGERY      Left leg jose ramon placement   • PEG TUBE INSERTION     • PEG TUBE REMOVAL     • TRACHEOSTOMY      with reversal   • TUBAL ABDOMINAL LIGATION       Family History   Problem Relation Age of Onset   • Heart murmur Mother    • Heart disease Father    • No Known Problems Brother    • No Known Problems Sister    • No Known Problems Brother      Social History     Socioeconomic History   • Marital status: Single   Tobacco Use   • Smoking  "status: Former Smoker     Packs/day: 0.00     Types: Electronic Cigarette   • Smokeless tobacco: Never Used   Vaping Use   • Vaping Use: Former   Substance and Sexual Activity   • Alcohol use: No   • Drug use: No     Comment: history of, narcotics   • Sexual activity: Never     Partners: Male     Birth control/protection: Injection     Comment: last act of intercourse a year ago     Allergies   Allergen Reactions   • Penicillins Other (See Comments)     As a child   • Tramadol Nausea And Vomiting     Current Outpatient Medications   Medication Sig Dispense Refill   • albuterol (PROVENTIL HFA;VENTOLIN HFA) 108 (90 Base) MCG/ACT inhaler Inhale.     • amitriptyline (ELAVIL) 50 MG tablet Take  by mouth.     • Blood Glucose Monitoring Suppl (FREESTYLE FREEDOM LITE) w/Device kit      • budesonide-formoterol (SYMBICORT) 80-4.5 MCG/ACT inhaler Inhale.     • calcium carbonate (TUMS) 500 MG chewable tablet Chew 1 tablet Daily.     • CBD oil (cannabidiol) capsule Take  by mouth.     • Cold Sore Products ([facility] Cold Sore Treatment) 0.13 % liquid 1 g Every 12 (Twelve) Hours.     • dextromethorphan-guaifenesin (ROBITUSSIN-DM)  MG/5ML syrup Take 5 mL by mouth Every 12 (Twelve) Hours.     • docosanol (Abreva) 10 % cream cream Apply 1 application topically to the appropriate area as directed 5 (Five) Times a Day.     • docusate sodium (COLACE) 100 MG capsule Take 100 mg by mouth 2 (Two) Times a Day.     • esomeprazole (NEXIUM) 40 MG packet Take 40 mg by mouth.     • fluticasone (FLONASE) 50 MCG/ACT nasal spray into the nostril(s) as directed by provider.     • furosemide (LASIX) 40 MG tablet Take 40 mg by mouth.     • guaiFENesin (Mucus Relief) 400 MG tablet Take 400 mg by mouth Every 4 (Four) Hours.     • ibuprofen (ADVIL,MOTRIN) 800 MG tablet Take 800 mg by mouth.     • Insulin Syringe-Needle U-100 30G X 5/16\" 0.5 ML misc      • latanoprost (XALATAN) 0.005 % ophthalmic solution 1 drop Every Night.     • lidocaine (LIDODERM) 5 " % Place 1 patch on the skin Daily As Needed for Moderate Pain (4-6). Remove & Discard patch within 12 hours or as directed by MD     • loratadine (CLARITIN) 10 MG tablet Take 10 mg by mouth.     • magnesium hydroxide (MILK OF MAGNESIA) 2400 MG/10ML suspension suspension Take 10 mL by mouth Daily As Needed.     • medroxyPROGESTERone (DEPO-PROVERA) 150 MG/ML injection Inject 150 mg into the appropriate muscle as directed by prescriber.     • Melatonin 3 MG capsule Take  by mouth.     • Menthol (HALLS COUGH DROPS MT) Apply  to the mouth or throat.     • metFORMIN (GLUCOPHAGE) 500 MG tablet Take 850 mg by mouth.     • metoprolol tartrate (LOPRESSOR) 100 MG tablet Take 100 mg by mouth 2 (Two) Times a Day.     • nystatin-triamcinolone (MYCOLOG II) 675583-6.1 UNIT/GM-% cream Apply  topically to the appropriate area as directed 2 (Two) Times a Day.     • oxyCODONE-acetaminophen (PERCOCET)  MG per tablet Take 1 tablet by mouth.     • PARoxetine (PAXIL) 30 MG tablet Take 30 mg by mouth.     • phenol (SORE THROAT SPRAY) 1.4 % liquid liquid Apply  to the mouth or throat As Needed.     • pregabalin (LYRICA) 100 MG capsule Take 100 mg by mouth 2 (Two) Times a Day.     • propranolol (INDERAL) 20 MG tablet Take 20 mg by mouth 3 (Three) Times a Day.     • SITagliptin (JANUVIA) 100 MG tablet Take 100 mg by mouth Daily.     • tiZANidine (ZANAFLEX) 4 MG tablet Take 4 mg by mouth.       No current facility-administered medications for this visit.     Review of Systems   Constitutional: Negative for chills and fever.   HENT: Negative for congestion.    Respiratory: Negative for shortness of breath.    Cardiovascular: Positive for leg swelling. Negative for chest pain.   Gastrointestinal: Negative for constipation, diarrhea, nausea and vomiting.   Musculoskeletal: Positive for gait problem.   Skin: Negative for wound.   Neurological: Positive for numbness.       OBJECTIVE     Vitals:    11/19/21 1201   BP: (!) 144/102   Pulse: 88    SpO2: 95%       PHYSICAL EXAM  Accompanied by transporter.    Foot/Ankle Exam:       General:   Appearance: appears stated age and healthy    Orientation: AAOx3    Affect: appropriate    Gait: antalgic    Assistance: independent    Shoe Gear:  Casual shoes (compression stockings and AFO)    VASCULAR      Right Foot Vascularity   Dorsalis pedis:  2+  Posterior tibial:  2+  Skin Temperature: warm    Edema Grading:  Trace and non-pitting  CFT:  < 3 seconds  Pedal Hair Growth:  Present  Varicosities: mild varicosities       Left Foot Vascularity   Dorsalis pedis:  2+  Posterior tibial:  2+  Skin Temperature: warm    Edema Grading:  Non-pitting and trace  CFT:  < 3 seconds  Pedal Hair Growth:  Present  Varicosities: mild varicosities        NEUROLOGIC     Right Foot Neurologic   Light touch sensation:  Diminished  Vibratory sensation:  Diminished  Hot/Cold sensation: diminished    Protective Sensation using [place]-Luis Monofilament:  7     Left Foot Neurologic   Light touch sensation:  Diminished  Vibratory sensation:  Diminished  Hot/cold sensation: diminished    Protective Sensation using [place]-Luis Monofilament:  7     MUSCULOSKELETAL      Right Foot Musculoskeletal   Ecchymosis:  None  Tenderness: none    Arch:  Normal  Hallux valgus: No    Hallux limitus: No       Left Foot Musculoskeletal   Ecchymosis:  None  Tenderness: none    Arch:  Normal  Hallux valgus: No    Hallux limitus: No       MUSCLE STRENGTH     Right Foot Muscle Strength   Foot dorsiflexion:  3  Foot plantar flexion:  5  Foot inversion:  5  Foot eversion:  5     Left Foot Muscle Strength   Foot dorsiflexion:  5  Foot plantar flexion:  5  Foot inversion:  5  Foot eversion:  5     RANGE OF MOTION      Right Foot Range of Motion   Foot and ankle ROM within normal limits       Left Foot Range of Motion   Foot and ankle ROM within normal limits       DERMATOLOGIC     Right Foot Dermatologic   Skin: corn    Nails: onychomycosis, abnormally  thick, dystrophic nails and absent (hallux)       Left Foot Dermatologic   Skin: skin intact    Nails: onychomycosis, abnormally thick and dystrophic nails       Image:       RADIOLOGY/NUCLEAR:  No results found.    LABORATORY/CULTURE RESULTS:      PATHOLOGY RESULTS:       ASSESSMENT/PLAN     Diagnoses and all orders for this visit:    1. Onychomycosis (Primary)    2. Foot callus    3. Dystrophic nail    4. Foot drop, right    5. Traumatic brain injury with loss of consciousness, sequela (HCC)      Comprehensive lower extremity examination and evaluation was performed.  Discussed findings and treatment plan including risks, benefits, and treatment options with patient in detail. Patient agreed with treatment plan.  After verbal consent obtained, nail(s) x9 debrided of length and thickness with nail nipper without incidence  After verbal consent obtained, calluses x1 pared utilizing dermal curette and/or scalpel without incidence  Patient may maintain nails and calluses at home utilizing emery board or pumice stone between visits as needed  Reviewed at home diabetic foot care including daily foot checks   Continue monitoring BG and control under direction of PCP.  Continue compression stockings and elevation of lower extremities.   Continue AFO.   An After Visit Summary was printed and given to the patient at discharge, including (if requested) any available informative/educational handouts regarding diagnosis, treatment, or medications. All questions were answered to patient/family satisfaction. Should symptoms fail to improve or worsen they agree to call or return to clinic or to go to the Emergency Department. Discussed the importance of following up with any needed screening tests/labs/specialist appointments and any requested follow-up recommended by me today. Importance of maintaining follow-up discussed and patient accepts that missed appointments can delay diagnosis and potentially lead to worsening of  conditions.  Return in about 3 months (around 2/19/2022)., or sooner if acute issues arise.          This document has been electronically signed by DEBRA Kraus on November 20, 2021 12:25 CST

## 2021-11-19 ENCOUNTER — OFFICE VISIT (OUTPATIENT)
Dept: PODIATRY | Facility: CLINIC | Age: 46
End: 2021-11-19

## 2021-11-19 VITALS
SYSTOLIC BLOOD PRESSURE: 144 MMHG | DIASTOLIC BLOOD PRESSURE: 102 MMHG | OXYGEN SATURATION: 95 % | WEIGHT: 226 LBS | HEART RATE: 88 BPM | BODY MASS INDEX: 36.32 KG/M2 | HEIGHT: 66 IN

## 2021-11-19 DIAGNOSIS — L84 FOOT CALLUS: ICD-10-CM

## 2021-11-19 DIAGNOSIS — L60.3 DYSTROPHIC NAIL: ICD-10-CM

## 2021-11-19 DIAGNOSIS — M21.371 FOOT DROP, RIGHT: ICD-10-CM

## 2021-11-19 DIAGNOSIS — B35.1 ONYCHOMYCOSIS: Primary | ICD-10-CM

## 2021-11-19 DIAGNOSIS — S06.9X9S TRAUMATIC BRAIN INJURY WITH LOSS OF CONSCIOUSNESS, SEQUELA (HCC): ICD-10-CM

## 2021-11-19 PROCEDURE — 11721 DEBRIDE NAIL 6 OR MORE: CPT | Performed by: NURSE PRACTITIONER

## 2021-11-19 PROCEDURE — 11055 PARING/CUTG B9 HYPRKER LES 1: CPT | Performed by: NURSE PRACTITIONER

## 2021-11-19 RX ORDER — LATANOPROST 50 UG/ML
1 SOLUTION/ DROPS OPHTHALMIC NIGHTLY
COMMUNITY

## 2022-01-05 ENCOUNTER — TELEPHONE (OUTPATIENT)
Dept: PODIATRY | Facility: CLINIC | Age: 47
End: 2022-01-05

## 2022-01-05 NOTE — TELEPHONE ENCOUNTER
pts PT called and stated that the pt has a spot on bottom of foot, poss callus, and its getting bigger and turning dark. Her next apt is 02/17/2022 and they just wanted to see about getting her in earlier. I told her best I could do right now was about a week earlier. She stated we will just leave where at for now and keep an eye on it. I told her if it progresses worse to call us back and we will see if we can squeeze her in somewhere. She sstated understanding

## 2022-03-25 ENCOUNTER — TELEPHONE (OUTPATIENT)
Dept: PODIATRY | Facility: CLINIC | Age: 47
End: 2022-03-25

## 2022-03-28 ENCOUNTER — OFFICE VISIT (OUTPATIENT)
Dept: PODIATRY | Facility: CLINIC | Age: 47
End: 2022-03-28

## 2022-03-28 VITALS
BODY MASS INDEX: 35.93 KG/M2 | HEIGHT: 66 IN | OXYGEN SATURATION: 96 % | DIASTOLIC BLOOD PRESSURE: 82 MMHG | HEART RATE: 83 BPM | SYSTOLIC BLOOD PRESSURE: 123 MMHG | WEIGHT: 223.6 LBS

## 2022-03-28 DIAGNOSIS — B35.1 ONYCHOMYCOSIS: Primary | ICD-10-CM

## 2022-03-28 DIAGNOSIS — L60.3 DYSTROPHIC NAIL: ICD-10-CM

## 2022-03-28 DIAGNOSIS — E11.42 TYPE 2 DIABETES MELLITUS WITH DIABETIC POLYNEUROPATHY, WITHOUT LONG-TERM CURRENT USE OF INSULIN: ICD-10-CM

## 2022-03-28 DIAGNOSIS — L84 FOOT CALLUS: ICD-10-CM

## 2022-03-28 DIAGNOSIS — S06.9X9S TRAUMATIC BRAIN INJURY WITH LOSS OF CONSCIOUSNESS, SEQUELA: ICD-10-CM

## 2022-03-28 DIAGNOSIS — E11.9 ENCOUNTER FOR DIABETIC FOOT EXAM: ICD-10-CM

## 2022-03-28 DIAGNOSIS — M21.371 FOOT DROP, RIGHT: ICD-10-CM

## 2022-03-28 PROCEDURE — 11056 PARNG/CUTG B9 HYPRKR LES 2-4: CPT | Performed by: NURSE PRACTITIONER

## 2022-03-28 PROCEDURE — 99213 OFFICE O/P EST LOW 20 MIN: CPT | Performed by: NURSE PRACTITIONER

## 2022-03-28 PROCEDURE — 11721 DEBRIDE NAIL 6 OR MORE: CPT | Performed by: NURSE PRACTITIONER

## 2022-03-28 NOTE — PROGRESS NOTES
[facility] - PODIATRY    Today's Date: 22    Patient Name: Zahida Geller  MRN: [id number]  CSN: [id number]  PCP: Judith Mauro MD  Referring Provider: No ref. provider found    SUBJECTIVE     Chief Complaint   Patient presents with   • Follow-up     Judith Mauro MD pcp2021 3 MONTH FU - pt states a sore on right hallux, sometimes has pain - pt denies pain, when the right hallux does hurt 9/10  - pt presents routine diabetic nail and foot care, sore on right hallux    • Diabetes     118mg/dl this am      HPI: Zahida Geller, a 46 y.o.female, comes to clinic as a(n) established patient presenting for diabetic foot exam, complaining of painful toenails and callus. Patient has h/o anxiety, asthma, back pain, depression, DM2, edema, HTN, TBI, previous substance abuse.  Patient notes that she has had recurrent painful callus to the right hallux, states that it appears that there is bleeding under the callus again.  Due to neuropathy and nail changes she is uncomfortable cutting her own nails.  Does admit to trying to attempt nail care of the right foot due to length of time between visits and growth of nails. Patient is NIDDM with last stated BG level of 118mg/dl.  Admits pain at 9/10 level and described as stabbing, nagging and sharp. Denies any constitutional symptoms. No other pedal complaints at this time.    Past Medical History:   Diagnosis Date   • Anxiety    • Asthma    • Chronic back pain    • Chronic hip pain    • Depression    • Diabetes mellitus (HCC)    • Edema of both legs    • Hypertension    • Restless leg syndrome    • Substance abuse in remission (HCC)    • Traumatic brain injury (HCC)      Past Surgical History:   Procedure Laterality Date   • BACK SURGERY      L5-L6 surgery   •  SECTION     • LEG SURGERY      Left leg jose ramon placement   • PEG TUBE INSERTION     • PEG TUBE REMOVAL     • TRACHEOSTOMY      with reversal   • TUBAL ABDOMINAL LIGATION        Family History   Problem Relation Age of Onset   • Heart murmur Mother    • Heart disease Father    • No Known Problems Brother    • No Known Problems Sister    • No Known Problems Brother      Social History     Socioeconomic History   • Marital status: Single   Tobacco Use   • Smoking status: Former Smoker     Packs/day: 0.00     Types: Electronic Cigarette   • Smokeless tobacco: Never Used   Vaping Use   • Vaping Use: Former   Substance and Sexual Activity   • Alcohol use: No   • Drug use: No     Comment: history of, narcotics   • Sexual activity: Never     Partners: Male     Birth control/protection: Injection     Comment: last act of intercourse a year ago     Allergies   Allergen Reactions   • Penicillins Other (See Comments)     As a child   • Tramadol Nausea And Vomiting     Current Outpatient Medications   Medication Sig Dispense Refill   • albuterol (PROVENTIL HFA;VENTOLIN HFA) 108 (90 Base) MCG/ACT inhaler Inhale.     • amitriptyline (ELAVIL) 50 MG tablet Take  by mouth.     • Blood Glucose Monitoring Suppl (FREESTYLE FREEDOM LITE) w/Device kit      • budesonide-formoterol (SYMBICORT) 80-4.5 MCG/ACT inhaler Inhale.     • calcium carbonate (TUMS) 500 MG chewable tablet Chew 1 tablet Daily.     • CBD oil (cannabidiol) capsule Take  by mouth.     • Cold Sore Products (GNP Cold Sore Treatment) 0.13 % liquid 1 g Every 12 (Twelve) Hours.     • dextromethorphan-guaifenesin (ROBITUSSIN-DM)  MG/5ML syrup Take 5 mL by mouth Every 12 (Twelve) Hours.     • docosanol (ABREVA) 10 % cream cream Apply 1 application topically to the appropriate area as directed 5 (Five) Times a Day.     • docusate sodium (COLACE) 100 MG capsule Take 100 mg by mouth 2 (Two) Times a Day.     • esomeprazole (NexIUM) 40 MG packet Take 40 mg by mouth.     • fluticasone (FLONASE) 50 MCG/ACT nasal spray into the nostril(s) as directed by provider.     • furosemide (LASIX) 40 MG tablet Take 40 mg by mouth.     • guaiFENesin (HUMIBID 3)  "400 MG tablet Take 400 mg by mouth Every 4 (Four) Hours.     • ibuprofen (ADVIL,MOTRIN) 800 MG tablet Take 800 mg by mouth.     • Insulin Syringe-Needle U-100 30G X 5/16\" 0.5 ML misc      • lidocaine (LIDODERM) 5 % Place 1 patch on the skin as directed by provider Daily As Needed for Moderate Pain . Remove & Discard patch within 12 hours or as directed by MD     • loratadine (CLARITIN) 10 MG tablet Take 10 mg by mouth.     • magnesium hydroxide (MILK OF MAGNESIA) 2400 MG/10ML suspension suspension Take 10 mL by mouth Daily As Needed.     • medroxyPROGESTERone (DEPO-PROVERA) 150 MG/ML injection Inject 150 mg into the appropriate muscle as directed by prescriber.     • Melatonin 3 MG capsule Take  by mouth.     • Menthol (HALLS COUGH DROPS MT) Apply  to the mouth or throat.     • metFORMIN (GLUCOPHAGE) 500 MG tablet Take 850 mg by mouth.     • metoprolol tartrate (LOPRESSOR) 100 MG tablet Take 100 mg by mouth 2 (Two) Times a Day.     • nystatin-triamcinolone (MYCOLOG II) 894653-1.1 UNIT/GM-% cream Apply  topically to the appropriate area as directed 2 (Two) Times a Day.     • oxyCODONE-acetaminophen (PERCOCET)  MG per tablet Take 1 tablet by mouth.     • PARoxetine (PAXIL) 30 MG tablet Take 30 mg by mouth.     • phenol (CHLORASEPTIC) 1.4 % liquid liquid Apply  to the mouth or throat As Needed.     • pregabalin (LYRICA) 100 MG capsule Take 100 mg by mouth 2 (Two) Times a Day.     • propranolol (INDERAL) 20 MG tablet Take 20 mg by mouth 3 (Three) Times a Day.     • SITagliptin (JANUVIA) 100 MG tablet Take 100 mg by mouth Daily.     • tiZANidine (ZANAFLEX) 4 MG tablet Take 4 mg by mouth.     • latanoprost (XALATAN) 0.005 % ophthalmic solution 1 drop Every Night.       No current facility-administered medications for this visit.     Review of Systems   Constitutional: Negative for chills and fever.   HENT: Negative for congestion.    Respiratory: Negative for shortness of breath.    Cardiovascular: Positive for leg " swelling. Negative for chest pain.   Gastrointestinal: Negative for constipation, diarrhea, nausea and vomiting.   Musculoskeletal: Positive for arthralgias and gait problem.   Skin: Negative for wound.        Preulcerative callus to right hallux   Neurological: Positive for numbness.       OBJECTIVE     Vitals:    03/28/22 1347   BP: 123/82   Pulse: 83   SpO2: 96%       PHYSICAL EXAM  Accompanied by transporter.    Foot/Ankle Exam:       General:   Diabetic Foot Exam Performed    Appearance: appears stated age and healthy    Orientation: AAOx3    Affect: appropriate    Gait: antalgic    Assistance: independent    Shoe Gear:  Casual shoes    VASCULAR      Right Foot Vascularity   Dorsalis pedis:  2+  Posterior tibial:  2+  Skin Temperature: warm    Edema Grading:  Trace and non-pitting  CFT:  3  Pedal Hair Growth:  Present  Varicosities: mild varicosities       Left Foot Vascularity   Dorsalis pedis:  2+  Posterior tibial:  2+  Skin Temperature: warm    Edema Grading:  Non-pitting and trace  CFT:  3  Pedal Hair Growth:  Present  Varicosities: mild varicosities        NEUROLOGIC     Right Foot Neurologic   Light touch sensation:  Diminished  Vibratory sensation:  Diminished  Hot/Cold sensation: diminished    Protective Sensation using [place]-Luis Monofilament:  7     Left Foot Neurologic   Light touch sensation:  Diminished  Vibratory sensation:  Diminished  Hot/cold sensation: diminished    Protective Sensation using [place]-Luis Monofilament:  7     MUSCULOSKELETAL      Right Foot Musculoskeletal   Ecchymosis:  None  Tenderness: right foot callus    Arch:  Normal  Hallux valgus: No    Hallux limitus: No       Left Foot Musculoskeletal   Ecchymosis:  None  Tenderness: toenails    Arch:  Normal  Hallux valgus: No    Hallux limitus: No       MUSCLE STRENGTH     Right Foot Muscle Strength   Foot dorsiflexion:  3  Foot plantar flexion:  5  Foot inversion:  5  Foot eversion:  5     Left Foot Muscle Strength    Foot dorsiflexion:  5  Foot plantar flexion:  5  Foot inversion:  5  Foot eversion:  5     RANGE OF MOTION      Right Foot Range of Motion   Foot and ankle ROM within normal limits       Left Foot Range of Motion   Foot and ankle ROM within normal limits       DERMATOLOGIC     Right Foot Dermatologic   Skin: corn    Nails: onychomycosis, abnormally thick, dystrophic nails and absent (hallux)       Left Foot Dermatologic   Skin: skin intact    Nails: onychomycosis, abnormally thick and dystrophic nails       Image:       RADIOLOGY/NUCLEAR:  No results found.    LABORATORY/CULTURE RESULTS:      PATHOLOGY RESULTS:       ASSESSMENT/PLAN     Diagnoses and all orders for this visit:    1. Onychomycosis (Primary)    2. Foot callus    3. Dystrophic nail    4. Foot drop, right    5. Traumatic brain injury with loss of consciousness, sequela (HCC)    6. Type 2 diabetes mellitus with diabetic polyneuropathy, without long-term current use of insulin (HCC)    7. Encounter for diabetic foot exam (HCC)      Comprehensive lower extremity examination and evaluation was performed.  Discussed findings and treatment plan including risks, benefits, and treatment options with patient in detail. Patient agreed with treatment plan.  Diabetic foot exam performed  After verbal consent obtained, nail(s) x6 debrided of length and thickness with nail nipper without incidence  After verbal consent obtained, calluses x2 pared utilizing dermal curette and/or scalpel without incidence  Patient may maintain nails and calluses at home utilizing emery board or pumice stone between visits as needed  Reviewed at home diabetic foot care including daily foot checks   Continue monitoring BG and control under direction of PCP.  Continue compression stockings and elevation of lower extremities.   Continue AFO.   An After Visit Summary was printed and given to the patient at discharge, including (if requested) any available informative/educational handouts  regarding diagnosis, treatment, or medications. All questions were answered to patient/family satisfaction. Should symptoms fail to improve or worsen they agree to call or return to clinic or to go to the Emergency Department. Discussed the importance of following up with any needed screening tests/labs/specialist appointments and any requested follow-up recommended by me today. Importance of maintaining follow-up discussed and patient accepts that missed appointments can delay diagnosis and potentially lead to worsening of conditions.  Return in about 3 months (around 6/28/2022)., or sooner if acute issues arise.          This document has been electronically signed by DEBRA Kraus on March 28, 2022 14:44 CDT

## 2022-04-06 ENCOUNTER — HOSPITAL ENCOUNTER (EMERGENCY)
Age: 47
Discharge: HOME | End: 2022-04-06
Payer: MEDICAID

## 2022-04-06 ENCOUNTER — APPOINTMENT (OUTPATIENT)
Dept: CT IMAGING | Age: 47
End: 2022-04-06
Payer: MEDICAID

## 2022-04-06 VITALS
HEART RATE: 87 BPM | OXYGEN SATURATION: 93 % | TEMPERATURE: 97.2 F | HEIGHT: 69 IN | RESPIRATION RATE: 18 BRPM | SYSTOLIC BLOOD PRESSURE: 112 MMHG | WEIGHT: 220 LBS | DIASTOLIC BLOOD PRESSURE: 68 MMHG | BODY MASS INDEX: 32.58 KG/M2

## 2022-04-06 DIAGNOSIS — S09.90XA CLOSED HEAD INJURY, INITIAL ENCOUNTER: Primary | ICD-10-CM
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
## 2022-04-06 PROCEDURE — 70450 CT HEAD/BRAIN W/O DYE: CPT

## 2022-04-06 PROCEDURE — 99282 EMERGENCY DEPT VISIT SF MDM: CPT

## 2022-04-06 ASSESSMENT — ENCOUNTER SYMPTOMS
PHOTOPHOBIA: 0
COUGH: 0
RHINORRHEA: 0
NAUSEA: 0
VOMITING: 0
SHORTNESS OF BREATH: 0
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
## 2022-04-14 ENCOUNTER — TELEPHONE (OUTPATIENT)
Dept: NEUROLOGY | Age: 47
End: 2022-04-14

## 2022-04-14 NOTE — TELEPHONE ENCOUNTER
Haylie De La Torre  with [facility] called patient was in the ED on 4-6-22 and was told follow up with neurology, kaz call Haylie De La Torre  [phone]      Thank You

## 2022-04-19 NOTE — TELEPHONE ENCOUNTER
Called Carrie Mendoza at [facility] to let her know that it looks like patient needs to follow up with her pcp at this time. And at the time of her appointment with the pcp, if the pcp thinks that patient will need a neurology appointment then she will send a referral to our office for this patient an appointment.

## 2022-05-20 ENCOUNTER — TRANSCRIBE ORDERS (OUTPATIENT)
Dept: ADMINISTRATIVE | Facility: HOSPITAL | Age: 47
End: 2022-05-20

## 2022-05-20 DIAGNOSIS — Z12.31 ENCOUNTER FOR SCREENING MAMMOGRAM FOR MALIGNANT NEOPLASM OF BREAST: Primary | ICD-10-CM

## 2022-06-13 ENCOUNTER — OFFICE VISIT (OUTPATIENT)
Dept: NEUROLOGY | Age: 47
End: 2022-06-13
Payer: MEDICAID

## 2022-06-13 VITALS
WEIGHT: 220 LBS | HEIGHT: 69 IN | SYSTOLIC BLOOD PRESSURE: 98 MMHG | BODY MASS INDEX: 32.58 KG/M2 | HEART RATE: 82 BPM | DIASTOLIC BLOOD PRESSURE: 63 MMHG

## 2022-06-13 DIAGNOSIS — Z87.820 HISTORY OF CLOSED HEAD INJURY: ICD-10-CM

## 2022-06-13 DIAGNOSIS — D32.9 MENINGIOMA (HCC): Primary | ICD-10-CM

## 2022-06-13 DIAGNOSIS — G81.10 SPASTIC HEMIPARESIS AFFECTING DOMINANT SIDE (HCC): ICD-10-CM

## 2022-06-13 PROCEDURE — 99203 OFFICE O/P NEW LOW 30 MIN: CPT | Performed by: PSYCHIATRY & NEUROLOGY

## 2022-06-13 RX ORDER — OXYCODONE HYDROCHLORIDE AND ACETAMINOPHEN 5; 325 MG/1; MG/1
1 TABLET ORAL EVERY 8 HOURS PRN
COMMUNITY

## 2022-06-13 RX ORDER — CALCIUM CARBONATE 200(500)MG
1 TABLET,CHEWABLE ORAL DAILY
COMMUNITY

## 2022-06-13 RX ORDER — DOCUSATE SODIUM 100 MG/1
100 CAPSULE, LIQUID FILLED ORAL 2 TIMES DAILY
COMMUNITY

## 2022-06-13 RX ORDER — GUAIFENESIN 400 MG/1
400 TABLET ORAL 4 TIMES DAILY PRN
COMMUNITY

## 2022-06-13 NOTE — PROGRESS NOTES
Chief Complaint   Patient presents with    Follow-up     pt was referred back for abnormal CT Head        Cammie Leonard is a 42y.o. year old female who is seen for evaluation of abnormal CT scan of the head. She has a history of a closed head injury and [facility]. In April of this year she went to the emergency room and ended up with a CT scan of the head. Those films are reviewed. 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 There is also a partially calcified right parafalcine meningioma measuring 1.7 cm when previously it had been about 1.2. I was asked to see her for this. It is not clearly causing her any difficulties as best I can tell. Active Ambulatory Problems     Diagnosis Date Noted    Right foot drop 12/31/2019    Falls frequently 12/31/2019    History of head injury 12/31/2019     Resolved Ambulatory Problems     Diagnosis Date Noted    No Resolved Ambulatory Problems     Past Medical History:   Diagnosis Date    Anxiety     Asthma     Chronic back pain     Chronic hip pain     Chronic pain syndrome     COPD (chronic obstructive pulmonary disease) (HCC)     Depression     Diabetes mellitus (HCC)     Edema     Encephalomalacia     Hypertension     RLS (restless legs syndrome)     Substance abuse ([address]     TBI [facility]traumatic [facility] injury) [facility] [address]        Past Surgical History:   Procedure Laterality Date    BACK SURGERY      LEG SURGERY Left     TUBAL LIGATION         History reviewed. No pertinent family history.     Allergies   Allergen Reactions    Pcn [Penicillins]     Tramadol        Social History     Socioeconomic History    Marital status: Single     Spouse name: Not on file    Number of children: Not on file    Years of education: Not on file    Highest education level: Not on file   Occupational History    Not on file   Tobacco Use    Smoking status: Former Smoker     Packs/day: 1.00     Types: E-Cigarettes    Smokeless tobacco: Never Used   Vaping Use    Vaping Use: Every day   Substance and Sexual Activity    Alcohol use: No    Drug use: No    Sexual activity: Never   Other Topics Concern    Not on file   Social History Narrative    Not on file     Social Determinants of Health     Financial Resource Strain:     Difficulty of Paying Living Expenses: Not on file   Food Insecurity:     Worried About Running Out of Food in the Last Year: Not on file    Benedict of Food in the Last Year: Not on file   Transportation Needs:     Lack of Transportation (Medical): Not on file    Lack of Transportation (Non-Medical): Not on file   Physical Activity:     Days of Exercise per Week: Not on file    Minutes of Exercise per Session: Not on file   Stress:     Feeling of Stress : Not on file   Social Connections:     Frequency of Communication with Friends and Family: Not on file    Frequency of Social Gatherings with Friends and Family: Not on file    Attends Holiness Services: Not on file    Active Member of Clubs or Organizations: Not on file    Attends Club or Organization Meetings: Not on file    Marital Status: Not on file   Intimate Partner Violence:     Fear of Current or Ex-Partner: Not on file    Emotionally Abused: Not on file    Physically Abused: Not on file    Sexually Abused: Not on file   Housing Stability:     Unable to Pay for Housing in the Last Year: Not on file    Number of [place] in the Last Year: Not on file    Unstable Housing in the Last Year: Not on file       Review of Systems      Constitutional - No fever or chills. No diaphoresis or significant fatigue. HENT -  No tinnitus or significant hearing loss. Eyes - no sudden vision change or eye pain  Respiratory - no significant shortness of breath or cough  Cardiovascular - no chest pain No palpitations or significant leg swelling  Gastrointestinal - no abdominal swelling or pain.     Genitourinary - No difficulty urinating, dysuria  Musculoskeletal - no back pain or myalgia. Skin - no color change or rash  Neurologic - No seizures. No lateralizing weakness. Hematologic - no easy bruising or excessive bleeding. Psychiatric - no severe anxiety or nervousness. All other review of systems are negative.            Current Outpatient Medications   Medication Sig Dispense Refill    NONFORMULARY CBD OIL      calcium carbonate (ANTACID) 500 MG chewable tablet Take 1 tablet by mouth daily      docusate sodium (COLACE) 100 mg capsule Take 100 mg by mouth 2 times daily      Magnesium Hydroxide (MILK OF MAGNESIA PO) Take by mouth      guaiFENesin 400 MG tablet Take 400 mg by mouth 4 times daily as needed for Cough      oxyCODONE-acetaminophen (PERCOCET) 5-325 MG per tablet Take 1 tablet by mouth every 8 hours as needed for Pain.  SITagliptin (JANUVIA) 50 MG tablet Take 100 mg by mouth daily       lidocaine (LIDODERM) 5 % Place 1 patch onto the skin daily 12 hours on, 12 hours off.       esomeprazole Magnesium (NEXIUM) 40 MG PACK Take 20 mg by mouth daily       melatonin 3 MG TABS tablet Take 3 mg by mouth daily      albuterol sulfate  (90 BASE) MCG/ACT inhaler Inhale 2 puffs into the lungs every 4 hours as needed for Wheezing      amitriptyline (ELAVIL) 50 MG tablet Take 50 mg by mouth 3 tablets nightly      budesonide-formoterol (SYMBICORT) 80-4.5 MCG/ACT AERO Inhale 2 puffs into the lungs 2 times daily      fluticasone (FLONASE) 50 MCG/ACT nasal spray 2 sprays by Nasal route daily      furosemide (LASIX) 40 MG tablet Take 40 mg by mouth daily      ibuprofen (ADVIL;MOTRIN) 800 MG tablet Take 800 mg by mouth every 8 hours as needed for Pain      loratadine (CLARITIN) 10 MG tablet Take 10 mg by mouth daily      medroxyPROGESTERone (DEPO-PROVERA) 150 MG/ML injection Inject 150 mg into the muscle every 3 months      metFORMIN (GLUCOPHAGE) 500 MG tablet Take 850 mg by mouth 3 times daily (before meals)       daily      medroxyPROGESTERone (DEPO-PROVERA) 150 MG/ML injection Inject 150 mg into the muscle every 3 months      metFORMIN (GLUCOPHAGE) 500 MG tablet Take 850 mg by mouth 3 times daily (before meals)       metoprolol (LOPRESSOR) 100 MG tablet Take 100 mg by mouth 2 times daily      PARoxetine (PAXIL) 30 MG tablet Take 40 mg by mouth every morning       pregabalin (LYRICA) 100 MG capsule Take 100 mg by mouth 3 times daily      propranolol (INDERAL) 20 MG tablet Take 20 mg by mouth 3 times daily       tiZANidine (ZANAFLEX) 4 MG tablet Take 4 mg by mouth 3 times daily         BP 98/63   Pulse 82   Ht 5' 9\" (1.753 m)   Wt 220 lb (99.8 kg)   BMI 32.49 kg/m²       Constitutional - well developed, well nourished. Eyes - conjunctiva normal.  Pupils react to light  Ear, nose, throat -hearing intact to finger rub No scars, masses, or lesions over external nose or ears, no atrophy of tongue  Neck-symmetric, no masses noted, no jugular vein distension. No bruits noted. Respiration- chest wall appears symmetric, good expansion,   normal effort without use of accessory muscles  Cardiovascular- RRR  Musculoskeletal - no significant wasting of muscles noted, no bony deformities, gait no gross ataxia  Extremities-no clubbing, cyanosis or edema  Skin - warm, dry, and intact. No rash, erythema, or pallor. Psychiatric - mood, affect, and behavior appear normal.      Neurological exam  Awake, alert, fluent oriented x 3 appropriate affect  Attention and concentration appear appropriate  Recent and remote memory appears unremarkable  Speech showing dysarthria  No clear issues with language of fund of knowledge    Cranial Nerve Exam   CN II-there appears to be a left homonymous hemianopsia. Her right pupil was larger than the left and her right eye is depressed and externally rotated at rest.  Decreased acuity on the right.   CN III, IV,VI- PERRLA, EOMI, No nystagmus, conjugate eye movements, no ptosis  CN V-sensation intact to LT over face  CN VII-no facial asymmetry  CN VIII-Hearing intact   CN IX and X- Palate elevates in midline  CN XI-good shoulder shrug  CN XII-Tongue midline with no fasciculations or fibrillations    Motor Exam  Spastic right hemiparesis    Sensory Exam  Sensation intact to light touch , PP  upper and lower extremities bilaterally; normal vibration sense in LE's    Reflexes   2+ biceps bilaterally  2+ brachioradialis  2+ triceps  2+patella  2+ ankle jerks  No clonus ankles  No Foote's sign bilateral hands. No Babinski sign. Tremors- no tremors in hands or head noted    Gait  Normal base and speed  No ataxia. No Romberg sign    Coordination  Finger to nose and BHAVIK-unremarkable    No results found for: [id number]  Lab Results   Component Value Date    WBC 7.0 08/18/2021    HGB 11.6 (L) 08/18/2021    HCT 37.9 08/18/2021    MCV 90.9 08/18/2021     08/18/2021     Lab Results   Component Value Date     08/18/2021    K 4.2 08/18/2021     08/18/2021    CO2 27 08/18/2021    BUN 14 08/18/2021    CREATININE 0.7 08/18/2021    GLUCOSE 142 (H) 08/18/2021    CALCIUM 8.8 08/18/2021    PROT 7.5 08/18/2021    LABALBU 4.1 08/18/2021    BILITOT 0.4 08/18/2021    ALKPHOS 123 (H) 08/18/2021    AST 22 08/18/2021    ALT 28 08/18/2021    LABGLOM >60 08/18/2021    GFRAA >59 08/18/2021           Assessment    ICD-10-CM    1. Meningioma [facility])  D32.9 [id number] [facility] Neurosurgery, [address] [place] [address]   2. History of closed head injury  Z87.820    3. Spastic hemiparesis affecting dominant side (HCC)  G81.10      Patient with right parafalcine meningioma showing some enlargement of size. I have referred her to neurosurgery to evaluate and consider resection when appropriate. Appears to have a partial right 3rd nerve palsy and pieces related to her previous head trauma. I would be happy to see her back again as needed.     Plan  Orders Placed This Encounter   Procedures   [address] Neurosurgery, [address] [facility]     Referral Priority:   Routine Referral Type:   Eval and Treat     Referral Reason:   Specialty Services Required     Requested Specialty:   Neurosurgery     Number of Visits Requested:   1           Return if symptoms worsen or fail to improve.     (Please note that portions of this note were completed with a voice recognition program. Efforts were made to edit the dictations but occasionally words are mis-transcribed.)

## 2022-06-15 ENCOUNTER — TELEPHONE (OUTPATIENT)
Dept: NEUROSURGERY | Age: 47
End: 2022-06-15

## 2022-06-16 NOTE — TELEPHONE ENCOUNTER
Patient lives in brain injury rehab facility and a caregiver will bring her to appointment. Patient had CT that shown Meningioma and referred to Dr Franki Tony. Patient can only be brought to appt on time and date that it was scheduled.

## 2022-06-16 NOTE — TELEPHONE ENCOUNTER
[facility] New Patient Questionnaire    1. Diagnosis/Reason for Referral?  Meningioma     2. Who is completing questionnaire? Patient  Caregiver Family      3. Has the patient had any previous spinal/brain surgeries? yes      A. If yes, what is the name of the facility in which the surgery was performed? [facility]     B. Procedure/Surgery performed? C. Who was the surgeon? D. When was the surgery? MM/YY       E. Did the patient improve after the surgery? 4. Is this a second opinion? If yes, Dr. Alexis Mason would like to review patient first before making the appointment. no    5. Have MRI Images been obtain within the last year? Yes  No      XR  CT 4/6/22     If yes, where was the imaging performed? If yes, what part of the body? Lumbar  Cervical  Thoracic  Brain     If yes, when was it obtained? 4-2022    Note: if the scan was performed at a facility other than [address] [place] [address], the disc will need to be brought to the appointment or we need to reach out to obtain the disc. A. Was the patient instructed to provide the disc? Yes   No      8. Has the patient had a NCV/EMG within the last year? Yes  No           9. Has the patient been to Physical Therapy? Yes  No     If yes, what location, how long attended, and last visit? Location: [facility]       Therapy Lasted: 2017   Date of Last Visit:2017      10. Has the patient been to Pain Management? Yes  No     If yes, what location and last visit     Location:   Last Visit:   Is it helping?

## 2022-06-28 ENCOUNTER — HOSPITAL ENCOUNTER (OUTPATIENT)
Dept: MAMMOGRAPHY | Facility: HOSPITAL | Age: 47
Discharge: HOME | End: 2022-06-28
Admitting: FAMILY MEDICINE

## 2022-06-28 DIAGNOSIS — Z12.31 ENCOUNTER FOR SCREENING MAMMOGRAM FOR MALIGNANT NEOPLASM OF BREAST: ICD-10-CM

## 2022-06-28 PROCEDURE — 77063 BREAST TOMOSYNTHESIS BI: CPT

## 2022-06-28 PROCEDURE — 77067 SCR MAMMO BI INCL CAD: CPT

## 2022-07-14 ENCOUNTER — OFFICE VISIT (OUTPATIENT)
Dept: NEUROSURGERY | Age: 47
End: 2022-07-14
Payer: MEDICAID

## 2022-07-14 VITALS
WEIGHT: 220 LBS | HEIGHT: 69 IN | DIASTOLIC BLOOD PRESSURE: 78 MMHG | HEART RATE: 75 BPM | SYSTOLIC BLOOD PRESSURE: 118 MMHG | BODY MASS INDEX: 32.58 KG/M2 | RESPIRATION RATE: 18 BRPM

## 2022-07-14 DIAGNOSIS — Z87.820 HISTORY OF TRAUMATIC BRAIN INJURY: ICD-10-CM

## 2022-07-14 DIAGNOSIS — D32.9 MENINGIOMA (HCC): Primary | ICD-10-CM

## 2022-07-14 PROCEDURE — 99204 OFFICE O/P NEW MOD 45 MIN: CPT | Performed by: NEUROLOGICAL SURGERY
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
## 2022-07-26 ENCOUNTER — HOSPITAL ENCOUNTER (OUTPATIENT)
Dept: MRI IMAGING | Age: 47
Discharge: HOME | End: 2022-07-26
Payer: MEDICAID

## 2022-07-26 DIAGNOSIS — D32.9 MENINGIOMA (HCC): ICD-10-CM

## 2022-07-26 PROCEDURE — A9577 INJ MULTIHANCE: HCPCS | Performed by: NEUROLOGICAL SURGERY

## 2022-07-26 PROCEDURE — 70553 MRI BRAIN STEM W/O & W/DYE: CPT | Performed by: RADIOLOGY

## 2022-07-26 PROCEDURE — 6360000004 HC RX CONTRAST MEDICATION: Performed by: NEUROLOGICAL SURGERY

## 2022-07-26 PROCEDURE — 70553 MRI BRAIN STEM W/O & W/DYE: CPT

## 2022-07-26 RX ADMIN — GADOBENATE DIMEGLUMINE 20 ML: 529 INJECTION, SOLUTION INTRAVENOUS at 14:15

## 2022-07-27 ENCOUNTER — OFFICE VISIT (OUTPATIENT)
Dept: NEUROSURGERY | Age: 47
End: 2022-07-27
Payer: MEDICAID

## 2022-07-27 VITALS
WEIGHT: 220 LBS | RESPIRATION RATE: 18 BRPM | BODY MASS INDEX: 32.58 KG/M2 | DIASTOLIC BLOOD PRESSURE: 85 MMHG | HEIGHT: 69 IN | SYSTOLIC BLOOD PRESSURE: 125 MMHG | HEART RATE: 65 BPM

## 2022-07-27 DIAGNOSIS — D32.9 MENINGIOMA (HCC): ICD-10-CM

## 2022-07-27 PROCEDURE — 99214 OFFICE O/P EST MOD 30 MIN: CPT | Performed by: NEUROLOGICAL SURGERY

## 2022-07-27 ASSESSMENT — ENCOUNTER SYMPTOMS
EYES NEGATIVE: 1
RESPIRATORY NEGATIVE: 1
GASTROINTESTINAL NEGATIVE: 1
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
## 2022-07-28 ENCOUNTER — TELEPHONE (OUTPATIENT)
Dept: RADIATION ONCOLOGY | Facility: HOSPITAL | Age: 47
End: 2022-07-28

## 2022-08-09 PROBLEM — D32.9 MENINGIOMA (HCC): Status: ACTIVE | Noted: 2022-07-27

## 2022-08-10 ENCOUNTER — HOSPITAL ENCOUNTER (OUTPATIENT)
Dept: RADIATION ONCOLOGY | Facility: HOSPITAL | Age: 47
Setting detail: RADIATION/ONCOLOGY SERIES
End: 2022-08-10

## 2022-08-10 PROBLEM — Z87.891 FORMER SMOKER: Status: ACTIVE | Noted: 2022-08-10

## 2022-08-10 NOTE — PROGRESS NOTES
[facility] Bar[facility] MD      Albin Lopes APRN  _______________________________________________  [facility]  Department of Radiation Oncology  [address] [place] [id number]  Office: [phone]  Fax: [phone]    DATE:  08/11/2022  PATIENT: Zahida Geller 1975                         MEDICAL RECORD #:  [id number]    Chief Complaint:   Chief Complaint   Patient presents with   • Meningioma                                                   REASON FOR CONSULTATION:  Zahida Geller is a 46 y.o. female that has been referred to our office for consideration of radiotherapy to the brain. Reports dizziness and weakness. Denies activity change, appetite change, fatigue, cough, SOB, chest pain, palpitations, nausea/vomiting, diarrhea, light-headedness, seizures, and headaches. She follows .     History of Present Illness:  04/06/2022 - CT Head without contrast due to fall:  • No acute intracranial process.   • Small frontal scalp contusion left of midline.   • Enlarging right parietal parafalcine meningioma, measuring approximately 1.7 cm craniocaudal. This previously measured 1.2 cm.    07/14/2022 - Appointment with :  • This is a 46 y.o. female with a prior history of a severe traumatic brain injury in 2003 who presents for neurosurgical evaluation of an apparent right parietal/parafalcine meningioma that appears to have enlarged on CT scan since 2019.   • I recommended that we obtain an MRI scan of the brain w/wo contrast to evaluate this lesion in more detail.   • I explained management options including continued observation, stereotactic radiosurgery and surgical resection.   • I will plan to follow up with her again when the MRI scan is complete.    07/26/2022 - MRI Brain with and without contrast:  • No abnormality is identified in the basal ganglia and thalami.   • No abnormality is identified in the brainstem.   • Unremarkable  cerebellum.   • There is no midline shift or brain herniation.   • There is no demonstrated extra-axial, intraparenchymal, or intraventricular hemorrhage.    • There are no abnormal intra-or extra-axial fluid collections.   • There are no areas of restricted diffusion to suggest acute ischemia.   • The cerebellopontine angles, internal auditory canals and the cisternal portions of the accoustico - facial nerves are unremarkable.   • Unremarkable exam of the skull.   • Normal soft tissue structures.   • The visualized paranasal sinuses and mastoid air cells are clear.   • The visualized portions of the orbits are unremarkable.   Impression:  • Unchanged 2.31.82.6 cm partially calcified posterior right parafalcine meningioma.     • Unchanged partial infiltration of the adjacent aspect of the superior sagittal dural venous sinus.   • Unchanged buckling of the adjacent right parietal cortex.   • Unchanged right parietal/occipital chronic encephalomalacia.   • Unchanged right temporal chronic encephalomalacia.   • Unchanged surrounding gliosis.   • No secondary dural venous sinus thrombosis or occlusion.    07/27/2022 - Appointment with :  • 46 y.o. F with a history of a severe TBI many years ago with static neurologic deficits returns for follow up after undergoing a brain MRI that has confirmed the presence of a right-sided parafalcine meningioma that was noted on a recent CT scan.   • Based on prior CT scans, this lesion appears to be slowly enlarging over time.   • I explained that the likely involvement of the superior sagittal sinus would make surgical resection of her tumor high-risk. I also explained that based on the relatively small size of the mass, she would be an excellent candidate for stereotactic radiosurgery.   • I have placed a referral to Dr. Barrera to establish care and discuss SRS in further detail.     History obtained from  PATIENT and CHART    PAST MEDICAL HISTORY  Past Medical History:    Diagnosis Date   • Anxiety    • Asthma    • Chronic back pain    • Chronic hip pain    • Depression    • Diabetes mellitus (HCC)    • Edema of both legs    • Hypertension    • Meningioma (HCC)    • Restless leg syndrome    • Substance abuse in remission (HCC)    • Traumatic brain injury (HCC)      PAST SURGICAL HISTORY  Past Surgical History:   Procedure Laterality Date   • BACK SURGERY      L5-L6 surgery   •  SECTION     • LEG SURGERY      Left leg jose ramon placement   • PEG TUBE INSERTION     • PEG TUBE REMOVAL     • TRACHEOSTOMY      with reversal   • TUBAL ABDOMINAL LIGATION        FAMILY HISTORY  family history includes Heart disease in her father; Heart murmur in her mother; No Known Problems in her brother, brother, and sister.    SOCIAL HISTORY  Social History     Tobacco Use   • Smoking status: Former Smoker     Packs/day: 0.00     Types: Electronic Cigarette   • Smokeless tobacco: Never Used   Vaping Use   • Vaping Use: Former   Substance Use Topics   • Alcohol use: No   • Drug use: No     Comment: history of, narcotics     ALLERGIES  Penicillins and Tramadol     MEDICATIONS    Current Outpatient Medications:   •  albuterol (PROVENTIL HFA;VENTOLIN HFA) 108 (90 Base) MCG/ACT inhaler, Inhale 2 puffs Every 4 (Four) Hours As Needed., Disp: , Rfl:   •  amitriptyline (ELAVIL) 50 MG tablet, Take 150 mg by mouth Every Night., Disp: , Rfl:   •  Blood Glucose Monitoring Suppl (FREESTYLE FREEDOM LITE) w/Device kit, , Disp: , Rfl:   •  budesonide-formoterol (SYMBICORT) 80-4.5 MCG/ACT inhaler, Inhale 2 puffs 2 (Two) Times a Day., Disp: , Rfl:   •  calcium carbonate (TUMS) 500 MG chewable tablet, Chew 1 tablet Daily., Disp: , Rfl:   •  CBD oil (cannabidiol) capsule, Take 1 capsule by mouth Daily., Disp: , Rfl:   •  Cold Sore Products (GNP Cold Sore Treatment) 0.13 % liquid, 1 g Every 12 (Twelve) Hours., Disp: , Rfl:   •  dextromethorphan-guaifenesin (ROBITUSSIN-DM)  MG/5ML syrup, Take 5 mL by mouth Every 12  "(Twelve) Hours., Disp: , Rfl:   •  docosanol (ABREVA) 10 % cream cream, Apply 1 application topically to the appropriate area as directed 5 (Five) Times a Day., Disp: , Rfl:   •  docusate sodium (COLACE) 100 MG capsule, Take 200 mg by mouth Daily As Needed., Disp: , Rfl:   •  esomeprazole (nexIUM) 20 MG capsule, Take 20 mg by mouth Every Morning Before Breakfast., Disp: , Rfl:   •  fluticasone (FLONASE) 50 MCG/ACT nasal spray, 2 sprays into the nostril(s) as directed by provider Daily., Disp: , Rfl:   •  furosemide (LASIX) 40 MG tablet, Take 40 mg by mouth Daily., Disp: , Rfl:   •  guaiFENesin (HUMIBID 3) 400 MG tablet, Take 400 mg by mouth Every 4 (Four) Hours As Needed., Disp: , Rfl:   •  ibuprofen (ADVIL,MOTRIN) 800 MG tablet, Take 800 mg by mouth Every 8 (Eight) Hours As Needed., Disp: , Rfl:   •  Insulin Syringe-Needle U-100 30G X 5/16\" 0.5 ML misc, , Disp: , Rfl:   •  latanoprost (XALATAN) 0.005 % ophthalmic solution, Administer 1 drop to both eyes Every Night., Disp: , Rfl:   •  lidocaine (LIDODERM) 5 %, Place 1 patch on the skin as directed by provider Daily As Needed for Moderate Pain . Remove & Discard patch within 12 hours or as directed by MD, Disp: , Rfl:   •  loratadine (CLARITIN) 10 MG tablet, Take 10 mg by mouth Daily., Disp: , Rfl:   •  magnesium hydroxide (MILK OF MAGNESIA) 2400 MG/10ML suspension suspension, Take 10 mL by mouth Daily As Needed., Disp: , Rfl:   •  medroxyPROGESTERone (DEPO-PROVERA) 150 MG/ML injection, Inject 150 mg into the appropriate muscle as directed by prescriber Every 3 (Three) Months., Disp: , Rfl:   •  Melatonin 3 MG capsule, Take 1 capsule by mouth Every Night., Disp: , Rfl:   •  Menthol (HALLS COUGH DROPS MT), Apply 1 drop to the mouth or throat As Needed., Disp: , Rfl:   •  metFORMIN (GLUCOPHAGE) 850 MG tablet, Take 850 mg by mouth 3 (Three) Times a Day., Disp: , Rfl:   •  metoprolol tartrate (LOPRESSOR) 100 MG tablet, Take 100 mg by mouth 2 (Two) Times a Day., Disp: , " Rfl:   •  nystatin-triamcinolone (MYCOLOG II) 717127-6.1 UNIT/GM-% cream, Apply  topically to the appropriate area as directed 2 (Two) Times a Day., Disp: , Rfl:   •  oxyCODONE-acetaminophen (PERCOCET)  MG per tablet, Take 1 tablet by mouth Every 8 (Eight) Hours As Needed., Disp: , Rfl:   •  PARoxetine (PAXIL) 40 MG tablet, Take 40 mg by mouth Every Morning., Disp: , Rfl:   •  phenol (CHLORASEPTIC) 1.4 % liquid liquid, Apply 5 sprays to the mouth or throat As Needed., Disp: , Rfl:   •  pregabalin (LYRICA) 100 MG capsule, Take 100 mg by mouth 3 (Three) Times a Day., Disp: , Rfl:   •  propranolol (INDERAL) 20 MG tablet, Take 20 mg by mouth 3 (Three) Times a Day., Disp: , Rfl:   •  SITagliptin (JANUVIA) 100 MG tablet, Take 100 mg by mouth Daily., Disp: , Rfl:   •  tiZANidine (ZANAFLEX) 4 MG tablet, Take 4 mg by mouth Every 8 (Eight) Hours As Needed., Disp: , Rfl:   •  LORazepam (ATIVAN) 1 MG tablet, Take 1 tablet by mouth Every 8 (Eight) Hours As Needed for Anxiety., Disp: 30 tablet, Rfl: 0    Current outpatient and discharge medications have been reconciled for the patient.  Reviewed by: Phillip Barrera III, MD    The following portions of the patient's history were reviewed and updated as appropriate: allergies, current medications, past family history, past medical history, past social history, past surgical history and problem list.    REVIEW OF SYSTEMS  Review of Systems   Constitutional: Negative for activity change, appetite change and fatigue.   HENT: Negative.    Respiratory: Negative.  Negative for cough and shortness of breath.    Cardiovascular: Negative.  Negative for chest pain, palpitations and leg swelling.   Gastrointestinal: Negative.    Genitourinary: Negative.    Musculoskeletal: Negative.    Skin: Negative.  Negative for color change.   Neurological: Positive for dizziness and weakness. Negative for seizures and headaches.        History of TBI   Hematological: Negative.  Negative for  "adenopathy.   Psychiatric/Behavioral: Negative.    All other systems reviewed and are negative.    PHYSICAL EXAM  VITAL SIGNS:   Vitals:    08/11/22 0953   BP: 130/79   Pulse: 95   SpO2: 95%  Comment: room air   Weight: 100 kg (221 lb)   Height: 167.6 cm (66\")   PainSc: 0-No pain     Physical Exam  Vitals reviewed.   Constitutional:       General: She is not in acute distress.     Appearance: Normal appearance.   HENT:      Head: Normocephalic.   Cardiovascular:      Rate and Rhythm: Normal rate and regular rhythm.      Pulses: Normal pulses.      Heart sounds: Normal heart sounds.   Pulmonary:      Effort: Pulmonary effort is normal. No respiratory distress.      Breath sounds: Normal breath sounds.   Abdominal:      General: Bowel sounds are normal.      Palpations: Abdomen is soft.   Musculoskeletal:         General: No tenderness. Normal range of motion.      Cervical back: Normal range of motion and neck supple.   Lymphadenopathy:      Cervical: No cervical adenopathy.   Skin:     General: Skin is warm.      Capillary Refill: Capillary refill takes less than 2 seconds.   Neurological:      Mental Status: She is alert. Mental status is at baseline.      Motor: Weakness present.      Gait: Gait abnormal.      Comments: History of TBI   Psychiatric:         Mood and Affect: Mood normal.         Behavior: Behavior normal.       Performance Status: ECOG (1) Restricted in physically strenuous activity, ambulatory and able to do work of light nature    Clinical Quality Measures  -Pain Documented by Standardized Tool, FPS  Zahida Geller reports a pain score of 0. Given her pain assessment as noted, treatment options were discussed and the following options were decided upon as a follow-up plan to address the patient's pain: No pain, no plan given.  Pain Medications             amitriptyline (ELAVIL) 50 MG tablet Take 150 mg by mouth Every Night.    ibuprofen (ADVIL,MOTRIN) 800 MG tablet Take 800 mg by mouth Every 8 " (Eight) Hours As Needed.    oxyCODONE-acetaminophen (PERCOCET)  MG per tablet Take 1 tablet by mouth Every 8 (Eight) Hours As Needed.    PARoxetine (PAXIL) 40 MG tablet Take 40 mg by mouth Every Morning.    pregabalin (LYRICA) 100 MG capsule Take 100 mg by mouth 3 (Three) Times a Day.    tiZANidine (ZANAFLEX) 4 MG tablet Take 4 mg by mouth Every 8 (Eight) Hours As Needed.        -Advanced Care Planning Advance Care Planning   ACP discussion was held with the patient during this visit. Patient does not have an advance directive, information provided.       -Body Mass Index Screening and Follow-Up Plan Class 2 Severe Obesity (BMI >=35 and <=39.9). Obesity-related health conditions include the following: none.     -Tobacco Use: Screening and Cessation Intervention Social History    Tobacco Use      Smoking status: Former Smoker        Packs/day: 0.00        Types: Electronic Cigarette      Smokeless tobacco: Never Used    ASSESSMENT AND PLAN  1. Meningioma (HCC)    2. Former smoker      Orders Placed This Encounter   Procedures   • MRI Brain With & Without Contrast     RECOMMENDATIONS: Zahida Geller is a very pleasant female diagnosed with Meningioma, 2.5 cm.     We discussed the role of radiation therapy with this diagnosis as well as the indications and rationale according to the NCCN Guidelines. We have reviewed the risks and benefits of stereotactic radiosurgery vs whole brain radiation, I have extensively reviewed the risks, benefits and alternatives of therapy. Risks include headaches, hair loss in the area treated, nausea, vomiting, fatigue, hearing loss, skin and scalp changes, trouble with memory and speech, seizures and progression of disease in spite of therapy with either local or systemic failure.  I have seen, examined and reviewed this patient's medication list, appropriate labs and imaging studies, reviewed relevant medical records and discussed the goals/plans of care with the patient and family  and answered all questions.     The option of definitive surgery has also been reviewed as well as surveillance.   I will order a SRS protocol MRI of the brain, necessary to assist with planning. Stereotactic radiosurgery is recommended to the right parietal parafalcine meningioma, likely will consist of 5 treatment fractions, final course pending.    The patient and family verbalize understanding of this discussion, voice no further questions and wish to proceed with recommended therapy.  We will simulate treatment fields to initiate the treatment planning. Continue ongoing management per primary care physician and other specialists.    Thank you for allowing me to assist in this patients care.    Patient Instructions   1) Plan on 5 treatments. There should be little to no side effects.  2) Stealth MRI brain ordered, they will call you  3) Return for mask and simulation next week.    Time Spent: I spent 58 minutes caring for Zahida on this date of service. This time includes time spent by me in the following activities: preparing for the visit, reviewing tests, obtaining and/or reviewing a separately obtained history, performing a medically appropriate examination and/or evaluation, counseling and educating the patient/family/caregiver, ordering medications, tests, or procedures, referring and communicating with other health care professionals, documenting information in the medical record and independently interpreting results and communicating that information with the patient/family/caregiver.   Phillip Barrera III, MD  08/11/2022

## 2022-08-11 ENCOUNTER — CONSULT (OUTPATIENT)
Dept: RADIATION ONCOLOGY | Facility: HOSPITAL | Age: 47
End: 2022-08-11

## 2022-08-11 ENCOUNTER — APPOINTMENT (OUTPATIENT)
Dept: RADIATION ONCOLOGY | Facility: HOSPITAL | Age: 47
End: 2022-08-11

## 2022-08-11 VITALS
HEIGHT: 66 IN | WEIGHT: 221 LBS | HEART RATE: 95 BPM | DIASTOLIC BLOOD PRESSURE: 79 MMHG | OXYGEN SATURATION: 95 % | BODY MASS INDEX: 35.52 KG/M2 | SYSTOLIC BLOOD PRESSURE: 130 MMHG

## 2022-08-11 DIAGNOSIS — Z87.891 FORMER SMOKER: ICD-10-CM

## 2022-08-11 DIAGNOSIS — D32.9 MENINGIOMA: Primary | ICD-10-CM

## 2022-08-11 PROCEDURE — G0463 HOSPITAL OUTPT CLINIC VISIT: HCPCS | Performed by: RADIOLOGY

## 2022-08-11 RX ORDER — PAROXETINE HYDROCHLORIDE 40 MG/1
40 TABLET, FILM COATED ORAL EVERY MORNING
COMMUNITY

## 2022-08-11 RX ORDER — LORAZEPAM 1 MG/1
1 TABLET ORAL EVERY 8 HOURS PRN
Qty: 30 TABLET | Refills: 0 | Status: SHIPPED | OUTPATIENT
Start: 2022-08-11

## 2022-08-11 NOTE — PATIENT INSTRUCTIONS
1) Plan on 5 treatments. There should be little to no side effects.  2) Stealth MRI brain ordered, they will call you  3) Return for mask and simulation next week.

## 2022-08-17 ENCOUNTER — LAB (OUTPATIENT)
Dept: LAB | Facility: HOSPITAL | Age: 47
End: 2022-08-17

## 2022-08-17 ENCOUNTER — HOSPITAL ENCOUNTER (OUTPATIENT)
Dept: RADIATION ONCOLOGY | Facility: HOSPITAL | Age: 47
Setting detail: RADIATION/ONCOLOGY SERIES
Discharge: HOME | End: 2022-08-17

## 2022-08-17 DIAGNOSIS — D32.9 MENINGIOMA: ICD-10-CM

## 2022-08-17 DIAGNOSIS — D32.9 MENINGIOMA: Primary | ICD-10-CM

## 2022-08-17 LAB — B-HCG UR QL: NEGATIVE

## 2022-08-17 PROCEDURE — 81025 URINE PREGNANCY TEST: CPT

## 2022-08-17 PROCEDURE — 77334 RADIATION TREATMENT AID(S): CPT | Performed by: RADIOLOGY

## 2022-08-17 NOTE — PROGRESS NOTES
[facility] - PODIATRY    Today's Date: 22    Patient Name: Zahida Geller  MRN: [id number]  CSN: [id number]  PCP: Judith Mauro MD  Referring Provider: No ref. provider found    SUBJECTIVE     Chief Complaint   Patient presents with   • Follow-up     Judith Mauro MD pcp2021 FOLLOW UP- DIABETIC FOOT CARE- pt states feet doing ok- pt denies pain- pt presents with thick nails, callus inside right great toe, dry skin   • Diabetes     191mg/dl BG this am     HPI: Zahida Geller, a 46 y.o.female, comes to clinic as a(n) established patient presenting for diabetic foot exam, complaining of painful toenails and callus. Patient has h/o anxiety, asthma, back pain, depression, DM2, edema, HTN, TBI, previous substance abuse.  Patient has a callus of the right hallux has returned but is not overly problematic at the current time.  Due to neuropathy and nail changes she is uncomfortable cutting her own nails.  States that she has attempted to provide nail care to her right second nail due to long growth between visits. Patient is NIDDM with last stated BG level of 191mg/dl.  Denies pain today. Denies any constitutional symptoms. No other pedal complaints at this time.    Past Medical History:   Diagnosis Date   • Anxiety    • Asthma    • Chronic back pain    • Chronic hip pain    • Depression    • Diabetes mellitus (HCC)    • Edema of both legs    • Hypertension    • Meningioma (HCC)    • Restless leg syndrome    • Substance abuse in remission (HCC)    • Traumatic brain injury (HCC)      Past Surgical History:   Procedure Laterality Date   • BACK SURGERY      L5-L6 surgery   •  SECTION     • LEG SURGERY      Left leg jose ramon placement   • PEG TUBE INSERTION     • PEG TUBE REMOVAL     • TRACHEOSTOMY      with reversal   • TUBAL ABDOMINAL LIGATION       Family History   Problem Relation Age of Onset   • Heart murmur Mother    • Heart disease Father    • No Known Problems Brother     • No Known Problems Sister    • No Known Problems Brother      Social History     Socioeconomic History   • Marital status: Single   Tobacco Use   • Smoking status: Former Smoker     Packs/day: 0.00     Types: Electronic Cigarette   • Smokeless tobacco: Never Used   Vaping Use   • Vaping Use: Former   Substance and Sexual Activity   • Alcohol use: No   • Drug use: No     Comment: history of, narcotics   • Sexual activity: Never     Partners: Male     Birth control/protection: Injection     Comment: last act of intercourse a year ago     Allergies   Allergen Reactions   • Penicillins Other (See Comments)     As a child   • Tramadol Nausea And Vomiting     Current Outpatient Medications   Medication Sig Dispense Refill   • albuterol (PROVENTIL HFA;VENTOLIN HFA) 108 (90 Base) MCG/ACT inhaler Inhale 2 puffs Every 4 (Four) Hours As Needed.     • amitriptyline (ELAVIL) 50 MG tablet Take 150 mg by mouth Every Night.     • Blood Glucose Monitoring Suppl (FREESTYLE FREEDOM LITE) w/Device kit      • budesonide-formoterol (SYMBICORT) 80-4.5 MCG/ACT inhaler Inhale 2 puffs 2 (Two) Times a Day.     • calcium carbonate (TUMS) 500 MG chewable tablet Chew 1 tablet Daily.     • CBD oil (cannabidiol) capsule Take 1 capsule by mouth Daily.     • Cold Sore Products (GNP Cold Sore Treatment) 0.13 % liquid 1 g Every 12 (Twelve) Hours.     • dextromethorphan-guaifenesin (ROBITUSSIN-DM)  MG/5ML syrup Take 5 mL by mouth Every 12 (Twelve) Hours.     • docosanol (ABREVA) 10 % cream cream Apply 1 application topically to the appropriate area as directed 5 (Five) Times a Day.     • docusate sodium (COLACE) 100 MG capsule Take 200 mg by mouth Daily As Needed.     • esomeprazole (nexIUM) 20 MG capsule Take 20 mg by mouth Every Morning Before Breakfast.     • fluticasone (FLONASE) 50 MCG/ACT nasal spray 2 sprays into the nostril(s) as directed by provider Daily.     • furosemide (LASIX) 40 MG tablet Take 40 mg by mouth Daily.     •  "guaiFENesin (HUMIBID 3) 400 MG tablet Take 400 mg by mouth Every 4 (Four) Hours As Needed.     • ibuprofen (ADVIL,MOTRIN) 800 MG tablet Take 800 mg by mouth Every 8 (Eight) Hours As Needed.     • Insulin Syringe-Needle U-100 30G X 5/16\" 0.5 ML misc      • latanoprost (XALATAN) 0.005 % ophthalmic solution Administer 1 drop to both eyes Every Night.     • lidocaine (LIDODERM) 5 % Place 1 patch on the skin as directed by provider Daily As Needed for Moderate Pain . Remove & Discard patch within 12 hours or as directed by MD     • loratadine (CLARITIN) 10 MG tablet Take 10 mg by mouth Daily.     • LORazepam (ATIVAN) 1 MG tablet Take 1 tablet by mouth Every 8 (Eight) Hours As Needed for Anxiety. 30 tablet 0   • magnesium hydroxide (MILK OF MAGNESIA) 2400 MG/10ML suspension suspension Take 10 mL by mouth Daily As Needed.     • medroxyPROGESTERone (DEPO-PROVERA) 150 MG/ML injection Inject 150 mg into the appropriate muscle as directed by prescriber Every 3 (Three) Months.     • Melatonin 3 MG capsule Take 1 capsule by mouth Every Night.     • Menthol (HALLS COUGH DROPS MT) Apply 1 drop to the mouth or throat As Needed.     • metFORMIN (GLUCOPHAGE) 850 MG tablet Take 850 mg by mouth 3 (Three) Times a Day.     • metoprolol tartrate (LOPRESSOR) 100 MG tablet Take 100 mg by mouth 2 (Two) Times a Day.     • nystatin-triamcinolone (MYCOLOG II) 543413-3.1 UNIT/GM-% cream Apply  topically to the appropriate area as directed 2 (Two) Times a Day.     • oxyCODONE-acetaminophen (PERCOCET)  MG per tablet Take 1 tablet by mouth Every 8 (Eight) Hours As Needed.     • PARoxetine (PAXIL) 40 MG tablet Take 40 mg by mouth Every Morning.     • phenol (CHLORASEPTIC) 1.4 % liquid liquid Apply 5 sprays to the mouth or throat As Needed.     • pregabalin (LYRICA) 100 MG capsule Take 100 mg by mouth 3 (Three) Times a Day.     • propranolol (INDERAL) 20 MG tablet Take 20 mg by mouth 3 (Three) Times a Day.     • SITagliptin (JANUVIA) 100 MG " tablet Take 100 mg by mouth Daily.     • tiZANidine (ZANAFLEX) 4 MG tablet Take 4 mg by mouth Every 8 (Eight) Hours As Needed.       No current facility-administered medications for this visit.     Review of Systems   Constitutional: Negative for chills and fever.   HENT: Negative for congestion.    Respiratory: Negative for shortness of breath.    Cardiovascular: Positive for leg swelling. Negative for chest pain.   Gastrointestinal: Negative for constipation, diarrhea, nausea and vomiting.   Musculoskeletal: Positive for arthralgias and gait problem.   Skin: Negative for wound.        Preulcerative callus to right hallux   Neurological: Positive for numbness.       OBJECTIVE     Vitals:    08/19/22 1117   BP: 128/82   Pulse: 99   SpO2: 97%       PHYSICAL EXAM  Accompanied by transporter.    Foot/Ankle Exam:       General:   Appearance: appears stated age and healthy    Orientation: AAOx3    Affect: appropriate    Gait: antalgic    Assistance: independent    Shoe Gear:  Casual shoes    VASCULAR      Right Foot Vascularity   Dorsalis pedis:  2+  Posterior tibial:  2+  Skin Temperature: warm    Edema Grading:  Trace and non-pitting  CFT:  3  Pedal Hair Growth:  Present  Varicosities: mild varicosities       Left Foot Vascularity   Dorsalis pedis:  2+  Posterior tibial:  2+  Skin Temperature: warm    Edema Grading:  Non-pitting and trace  CFT:  3  Pedal Hair Growth:  Present  Varicosities: mild varicosities        NEUROLOGIC     Right Foot Neurologic   Light touch sensation:  Diminished  Vibratory sensation:  Diminished  Hot/Cold sensation: diminished    Protective Sensation using Menno-Luis Monofilament:  7     Left Foot Neurologic   Light touch sensation:  Diminished  Vibratory sensation:  Diminished  Hot/cold sensation: diminished    Protective Sensation using Menno-Luis Monofilament:  7     MUSCULOSKELETAL      Right Foot Musculoskeletal   Ecchymosis:  None  Tenderness: right foot callus and toenails     Arch:  Normal  Hallux valgus: No    Hallux limitus: No       Left Foot Musculoskeletal   Ecchymosis:  None  Tenderness: toenails    Arch:  Normal  Hallux valgus: No    Hallux limitus: No       MUSCLE STRENGTH     Right Foot Muscle Strength   Foot dorsiflexion:  3  Foot plantar flexion:  5  Foot inversion:  5  Foot eversion:  5     Left Foot Muscle Strength   Foot dorsiflexion:  5  Foot plantar flexion:  5  Foot inversion:  5  Foot eversion:  5     RANGE OF MOTION      Right Foot Range of Motion   Foot and ankle ROM within normal limits       Left Foot Range of Motion   Foot and ankle ROM within normal limits       DERMATOLOGIC     Right Foot Dermatologic   Skin: corn    Nails: onychomycosis, abnormally thick, dystrophic nails and absent (hallux)       Left Foot Dermatologic   Skin: corn    Nails: onychomycosis, abnormally thick and dystrophic nails       Image:       RADIOLOGY/NUCLEAR:  MRI BRAIN W WO CONTRAST    Result Date: 7/28/2022  Narrative: Exam: MRI OF THE BRAIN WITHOUT AND WITHINTRAVENOUS CONTRAST Clinical data:Meningioma.  No history of cancer, stroke, or seizure.  No known injury. Technique: Multiplanar multi-sequence MRI of the brain without and with intravenous gadolinium. Diffusion-weighted imaging is submitted. Contrast used:MultiHance. Amount: 20 mL. Axial, coronal and sagittal FLAIR, T1 and T2 weighted images were obtained. Axial diffusion images with reconstructed ADC maps. Susceptibility weighted images were obtained. Gradient echo images were also obtained.  Post gadolinium images were obtained. Prior studies: CT scan of the brain dated 04/06/22 images. Findings: The remaining ventricles and extra-axial spaces are normal for the patient's age. No abnormality is identified in the basal ganglia and thalami. No abnormality is identified in the brainstem. Unremarkable cerebellum. There is no midline shift or brain herniation. There is no demonstrated extra-axial, intraparenchymal, or  intraventricular hemorrhage. There are no abnormal intra-or extra-axial fluid collections. There are no areas of restricted diffusion to suggest acute ischemia. The cerebellopontine angles, internal auditory canals and the cisternal portions of the accoustico - facial nerves are unremarkable. Unremarkable exam of the skull. Normal soft tissue structures. The visualized paranasal sinuses and mastoid air cells are clear. The visualized portions of the orbits are unremarkable.     Impression: 1. Unchanged 2.31.82.6 cm partially calcified posterior right parafalcine meningioma.   2. Unchanged partial infiltration of the adjacent aspect of the superior sagittal dural venous sinus. 3. Unchanged buckling of the adjacent right parietal cortex. 4. Unchanged right parietal/occipital chronic encephalomalacia. 5. Unchanged right temporal chronic encephalomalacia. 6. Unchanged surrounding gliosis. 7. No secondary dural venous sinus thrombosis or occlusion. Recommendation: Follow up as clinically indicated.          Electronically Signed by EDMAR BRUMFIELD MD at 28-Jul-2022 09:50:09 AM               LABORATORY/CULTURE RESULTS:      PATHOLOGY RESULTS:       ASSESSMENT/PLAN     Diagnoses and all orders for this visit:    1. Onychomycosis (Primary)    2. Dystrophic nail    3. Traumatic brain injury with loss of consciousness, sequela (HCC)    4. Type 2 diabetes mellitus with diabetic polyneuropathy, without long-term current use of insulin (HCC)    5. Foot callus      Comprehensive lower extremity examination and evaluation was performed.  Discussed findings and treatment plan including risks, benefits, and treatment options with patient in detail. Patient agreed with treatment plan.  After verbal consent obtained, nail(s) x9 debrided of length and thickness with nail nipper without incidence  After verbal consent obtained, calluses x2 pared utilizing dermal curette and/or scalpel without incidence  Patient may maintain nails and  calluses at home utilizing emery board or pumice stone between visits as needed  Reviewed at home diabetic foot care including daily foot checks   Continue monitoring BG and control under direction of PCP.  Continue compression stockings and elevation of lower extremities.   Continue AFO.   An After Visit Summary was printed and given to the patient at discharge, including (if requested) any available informative/educational handouts regarding diagnosis, treatment, or medications. All questions were answered to patient/family satisfaction. Should symptoms fail to improve or worsen they agree to call or return to clinic or to go to the Emergency Department. Discussed the importance of following up with any needed screening tests/labs/specialist appointments and any requested follow-up recommended by me today. Importance of maintaining follow-up discussed and patient accepts that missed appointments can delay diagnosis and potentially lead to worsening of conditions.  Return in about 3 months (around 11/19/2022)., or sooner if acute issues arise.          This document has been electronically signed by DEBRA Kraus on August 19, 2022 12:37 CDT

## 2022-08-19 ENCOUNTER — OFFICE VISIT (OUTPATIENT)
Dept: PODIATRY | Facility: CLINIC | Age: 47
End: 2022-08-19

## 2022-08-19 VITALS
WEIGHT: 224 LBS | HEART RATE: 99 BPM | HEIGHT: 66 IN | SYSTOLIC BLOOD PRESSURE: 128 MMHG | OXYGEN SATURATION: 97 % | DIASTOLIC BLOOD PRESSURE: 82 MMHG | BODY MASS INDEX: 36 KG/M2

## 2022-08-19 DIAGNOSIS — E11.42 TYPE 2 DIABETES MELLITUS WITH DIABETIC POLYNEUROPATHY, WITHOUT LONG-TERM CURRENT USE OF INSULIN: ICD-10-CM

## 2022-08-19 DIAGNOSIS — S06.9X9S TRAUMATIC BRAIN INJURY WITH LOSS OF CONSCIOUSNESS, SEQUELA: ICD-10-CM

## 2022-08-19 DIAGNOSIS — B35.1 ONYCHOMYCOSIS: Primary | ICD-10-CM

## 2022-08-19 DIAGNOSIS — L60.3 DYSTROPHIC NAIL: ICD-10-CM

## 2022-08-19 DIAGNOSIS — L84 FOOT CALLUS: ICD-10-CM

## 2022-08-19 PROCEDURE — 11721 DEBRIDE NAIL 6 OR MORE: CPT | Performed by: NURSE PRACTITIONER

## 2022-08-19 PROCEDURE — 11056 PARNG/CUTG B9 HYPRKR LES 2-4: CPT | Performed by: NURSE PRACTITIONER

## 2022-08-22 ENCOUNTER — HOSPITAL ENCOUNTER (OUTPATIENT)
Dept: MRI IMAGING | Facility: HOSPITAL | Age: 47
Discharge: HOME | End: 2022-08-22

## 2022-08-22 DIAGNOSIS — D32.9 MENINGIOMA: ICD-10-CM

## 2022-08-22 LAB — CREAT BLDA-MCNC: 1.1 MG/DL (ref 0.6–1.3)

## 2022-08-22 PROCEDURE — 0 GADOBENATE DIMEGLUMINE 529 MG/ML SOLUTION

## 2022-08-22 PROCEDURE — A9577 INJ MULTIHANCE: HCPCS

## 2022-08-22 PROCEDURE — 70553 MRI BRAIN STEM W/O & W/DYE: CPT

## 2022-08-22 PROCEDURE — 82565 ASSAY OF CREATININE: CPT

## 2022-08-22 RX ADMIN — GADOBENATE DIMEGLUMINE 14 ML: 529 INJECTION, SOLUTION INTRAVENOUS at 11:14

## 2022-09-01 ENCOUNTER — HOSPITAL ENCOUNTER (OUTPATIENT)
Dept: RADIATION ONCOLOGY | Facility: HOSPITAL | Age: 47
Setting detail: RADIATION/ONCOLOGY SERIES
End: 2022-09-01

## 2022-09-06 ENCOUNTER — APPOINTMENT (OUTPATIENT)
Dept: MRI IMAGING | Facility: HOSPITAL | Age: 47
End: 2022-09-06

## 2022-09-12 PROCEDURE — 77301 RADIOTHERAPY DOSE PLAN IMRT: CPT | Performed by: RADIOLOGY

## 2022-09-12 PROCEDURE — 77338 DESIGN MLC DEVICE FOR IMRT: CPT | Performed by: RADIOLOGY

## 2022-09-12 PROCEDURE — 77300 RADIATION THERAPY DOSE PLAN: CPT | Performed by: RADIOLOGY

## 2022-09-28 ENCOUNTER — LAB (OUTPATIENT)
Dept: LAB | Facility: HOSPITAL | Age: 47
End: 2022-09-28

## 2022-09-28 ENCOUNTER — HOSPITAL ENCOUNTER (OUTPATIENT)
Dept: RADIATION ONCOLOGY | Facility: HOSPITAL | Age: 47
Setting detail: RADIATION/ONCOLOGY SERIES
Discharge: HOME | End: 2022-09-28

## 2022-09-28 DIAGNOSIS — D32.9 MENINGIOMA: Primary | ICD-10-CM

## 2022-09-28 DIAGNOSIS — D32.9 MENINGIOMA: ICD-10-CM

## 2022-09-28 LAB — HCG SERPL QL: NEGATIVE

## 2022-09-28 PROCEDURE — 36415 COLL VENOUS BLD VENIPUNCTURE: CPT

## 2022-09-28 PROCEDURE — 84703 CHORIONIC GONADOTROPIN ASSAY: CPT

## 2022-09-30 ENCOUNTER — HOSPITAL ENCOUNTER (OUTPATIENT)
Dept: RADIATION ONCOLOGY | Facility: HOSPITAL | Age: 47
Setting detail: RADIATION/ONCOLOGY SERIES
Discharge: HOME | End: 2022-09-30

## 2022-10-03 ENCOUNTER — HOSPITAL ENCOUNTER (OUTPATIENT)
Dept: RADIATION ONCOLOGY | Facility: HOSPITAL | Age: 47
Setting detail: RADIATION/ONCOLOGY SERIES
End: 2022-10-03

## 2022-10-04 ENCOUNTER — HOSPITAL ENCOUNTER (OUTPATIENT)
Dept: RADIATION ONCOLOGY | Facility: HOSPITAL | Age: 47
Setting detail: RADIATION/ONCOLOGY SERIES
Discharge: HOME | End: 2022-10-04

## 2022-10-04 PROCEDURE — 77373 STRTCTC BDY RAD THER TX DLVR: CPT | Performed by: RADIOLOGY

## 2022-10-06 ENCOUNTER — HOSPITAL ENCOUNTER (OUTPATIENT)
Dept: RADIATION ONCOLOGY | Facility: HOSPITAL | Age: 47
Setting detail: RADIATION/ONCOLOGY SERIES
Discharge: HOME | End: 2022-10-06

## 2022-10-06 LAB
RAD ONC ARIA COURSE ID: NORMAL
RAD ONC ARIA COURSE LAST TREATMENT DATE: NORMAL
RAD ONC ARIA COURSE START DATE: NORMAL
RAD ONC ARIA COURSE TREATMENT ELAPSED DAYS: 2
RAD ONC ARIA FIRST TREATMENT DATE: NORMAL
RAD ONC ARIA PLAN FRACTIONS TREATED TO DATE: 2
RAD ONC ARIA PLAN ID: NORMAL
RAD ONC ARIA PLAN PRESCRIBED DOSE PER FRACTION: 5 GY
RAD ONC ARIA PLAN PRIMARY REFERENCE POINT: NORMAL
RAD ONC ARIA PLAN TOTAL FRACTIONS PRESCRIBED: 5
RAD ONC ARIA PLAN TOTAL PRESCRIBED DOSE: 2500 CGY
RAD ONC ARIA REFERENCE POINT DOSAGE GIVEN TO DATE: 10 GY
RAD ONC ARIA REFERENCE POINT ID: NORMAL
RAD ONC ARIA REFERENCE POINT SESSION DOSAGE GIVEN: 5 GY

## 2022-10-06 PROCEDURE — 77373 STRTCTC BDY RAD THER TX DLVR: CPT | Performed by: RADIOLOGY

## 2022-10-10 ENCOUNTER — HOSPITAL ENCOUNTER (OUTPATIENT)
Dept: RADIATION ONCOLOGY | Facility: HOSPITAL | Age: 47
Setting detail: RADIATION/ONCOLOGY SERIES
Discharge: HOME | End: 2022-10-10

## 2022-10-10 LAB
RAD ONC ARIA COURSE ID: NORMAL
RAD ONC ARIA COURSE LAST TREATMENT DATE: NORMAL
RAD ONC ARIA COURSE START DATE: NORMAL
RAD ONC ARIA COURSE TREATMENT ELAPSED DAYS: 6
RAD ONC ARIA FIRST TREATMENT DATE: NORMAL
RAD ONC ARIA PLAN FRACTIONS TREATED TO DATE: 3
RAD ONC ARIA PLAN ID: NORMAL
RAD ONC ARIA PLAN PRESCRIBED DOSE PER FRACTION: 5 GY
RAD ONC ARIA PLAN PRIMARY REFERENCE POINT: NORMAL
RAD ONC ARIA PLAN TOTAL FRACTIONS PRESCRIBED: 5
RAD ONC ARIA PLAN TOTAL PRESCRIBED DOSE: 2500 CGY
RAD ONC ARIA REFERENCE POINT DOSAGE GIVEN TO DATE: 15 GY
RAD ONC ARIA REFERENCE POINT ID: NORMAL
RAD ONC ARIA REFERENCE POINT SESSION DOSAGE GIVEN: 5 GY

## 2022-10-10 PROCEDURE — 77373 STRTCTC BDY RAD THER TX DLVR: CPT | Performed by: RADIOLOGY

## 2022-10-14 ENCOUNTER — HOSPITAL ENCOUNTER (OUTPATIENT)
Dept: RADIATION ONCOLOGY | Facility: HOSPITAL | Age: 47
Setting detail: RADIATION/ONCOLOGY SERIES
Discharge: HOME | End: 2022-10-14

## 2022-10-14 LAB
RAD ONC ARIA COURSE ID: NORMAL
RAD ONC ARIA COURSE LAST TREATMENT DATE: NORMAL
RAD ONC ARIA COURSE START DATE: NORMAL
RAD ONC ARIA COURSE TREATMENT ELAPSED DAYS: 10
RAD ONC ARIA FIRST TREATMENT DATE: NORMAL
RAD ONC ARIA PLAN FRACTIONS TREATED TO DATE: 4
RAD ONC ARIA PLAN ID: NORMAL
RAD ONC ARIA PLAN PRESCRIBED DOSE PER FRACTION: 5 GY
RAD ONC ARIA PLAN PRIMARY REFERENCE POINT: NORMAL
RAD ONC ARIA PLAN TOTAL FRACTIONS PRESCRIBED: 5
RAD ONC ARIA PLAN TOTAL PRESCRIBED DOSE: 2500 CGY
RAD ONC ARIA REFERENCE POINT DOSAGE GIVEN TO DATE: 20 GY
RAD ONC ARIA REFERENCE POINT ID: NORMAL
RAD ONC ARIA REFERENCE POINT SESSION DOSAGE GIVEN: 5 GY

## 2022-10-14 PROCEDURE — 77373 STRTCTC BDY RAD THER TX DLVR: CPT | Performed by: RADIOLOGY

## 2022-10-18 ENCOUNTER — HOSPITAL ENCOUNTER (OUTPATIENT)
Dept: RADIATION ONCOLOGY | Facility: HOSPITAL | Age: 47
Setting detail: RADIATION/ONCOLOGY SERIES
Discharge: HOME | End: 2022-10-18

## 2022-10-18 LAB
RAD ONC ARIA COURSE ID: NORMAL
RAD ONC ARIA COURSE LAST TREATMENT DATE: NORMAL
RAD ONC ARIA COURSE START DATE: NORMAL
RAD ONC ARIA COURSE TREATMENT ELAPSED DAYS: 14
RAD ONC ARIA FIRST TREATMENT DATE: NORMAL
RAD ONC ARIA PLAN FRACTIONS TREATED TO DATE: 5
RAD ONC ARIA PLAN ID: NORMAL
RAD ONC ARIA PLAN PRESCRIBED DOSE PER FRACTION: 5 GY
RAD ONC ARIA PLAN PRIMARY REFERENCE POINT: NORMAL
RAD ONC ARIA PLAN TOTAL FRACTIONS PRESCRIBED: 5
RAD ONC ARIA PLAN TOTAL PRESCRIBED DOSE: 2500 CGY
RAD ONC ARIA REFERENCE POINT DOSAGE GIVEN TO DATE: 25 GY
RAD ONC ARIA REFERENCE POINT ID: NORMAL
RAD ONC ARIA REFERENCE POINT SESSION DOSAGE GIVEN: 5 GY

## 2022-10-18 PROCEDURE — 77336 RADIATION PHYSICS CONSULT: CPT | Performed by: RADIOLOGY

## 2022-10-18 PROCEDURE — 77373 STRTCTC BDY RAD THER TX DLVR: CPT | Performed by: RADIOLOGY

## 2022-10-21 DIAGNOSIS — D32.9 MENINGIOMA (HCC): Primary | ICD-10-CM

## 2022-11-12 ENCOUNTER — APPOINTMENT (OUTPATIENT)
Dept: CT IMAGING | Facility: HOSPITAL | Age: 47
End: 2022-11-12

## 2022-11-12 ENCOUNTER — HOSPITAL ENCOUNTER (EMERGENCY)
Facility: HOSPITAL | Age: 47
Discharge: HOME | End: 2022-11-12
Attending: EMERGENCY MEDICINE | Admitting: EMERGENCY MEDICINE

## 2022-11-12 VITALS
BODY MASS INDEX: 32.29 KG/M2 | HEART RATE: 87 BPM | RESPIRATION RATE: 18 BRPM | DIASTOLIC BLOOD PRESSURE: 89 MMHG | WEIGHT: 218 LBS | HEIGHT: 69 IN | OXYGEN SATURATION: 98 % | SYSTOLIC BLOOD PRESSURE: 120 MMHG | TEMPERATURE: 98.5 F

## 2022-11-12 DIAGNOSIS — S09.90XA MINOR HEAD INJURY, INITIAL ENCOUNTER: Primary | ICD-10-CM

## 2022-11-12 DIAGNOSIS — H05.231 PERIORBITAL HEMATOMA OF RIGHT EYE: ICD-10-CM

## 2022-11-12 PROCEDURE — 70450 CT HEAD/BRAIN W/O DYE: CPT

## 2022-11-12 PROCEDURE — 70486 CT MAXILLOFACIAL W/O DYE: CPT

## 2022-11-12 PROCEDURE — 99283 EMERGENCY DEPT VISIT LOW MDM: CPT

## 2022-11-12 NOTE — ED NOTES
Pt reports she fell asleep while on the toilet, pt reports she awoke on the way down, denies any loc, neck or back pain

## 2022-11-12 NOTE — ED PROVIDER NOTES
Subjective   History of Present Illness  47-year-old female presents to the ED for evaluation following fall with head injury and right periorbital injury.  She has history of hypertension, diabetes, meningioma, severe TBI  and currently resides at a neuro restorative facility.  Patient states she was using the restroom during the night, was extremely tired and fell asleep on the commode.  She fell forward striking her head on a hard surface and sustained injury to the right side of her forehead and right periorbital region.  Patient remembers falling asleep but does not remember the fall.  Complains of headache and right periorbital pain and swelling.  No new neurodeficits.    History provided by:  Patient and EMS personnel      Review of Systems   All other systems reviewed and are negative.      Past Medical History:   Diagnosis Date   • Anxiety    • Asthma    • Chronic back pain    • Chronic hip pain    • Depression    • Diabetes mellitus (HCC)    • Edema of both legs    • Hypertension    • Meningioma (HCC)    • Restless leg syndrome    • Substance abuse in remission ([facility])    • Traumatic brain injury        Allergies   Allergen Reactions   • Penicillins Other (See Comments)     As a child   • Tramadol Nausea And Vomiting       Past Surgical History:   Procedure Laterality Date   • BACK SURGERY      L5-L6 surgery   •  SECTION     • LEG SURGERY      Left leg jose ramon placement   • PEG TUBE INSERTION     • PEG TUBE REMOVAL     • TRACHEOSTOMY      with reversal   • TUBAL ABDOMINAL LIGATION         Family History   Problem Relation Age of Onset   • Heart murmur Mother    • Heart disease Father    • No Known Problems Brother    • No Known Problems Sister    • No Known Problems Brother        Social History     Socioeconomic History   • Marital status: Single   Tobacco Use   • Smoking status: Former     Packs/day: 0.00     Types: Electronic Cigarette, Cigarettes   • Smokeless tobacco: Never   Vaping Use   •  Vaping Use: Former   Substance and Sexual Activity   • Alcohol use: No   • Drug use: No     Comment: history of, narcotics   • Sexual activity: Never     Partners: Male     Birth control/protection: Injection     Comment: last act of intercourse a year ago           Objective   Physical Exam  Vitals and nursing note reviewed.   Constitutional:       Appearance: She is normal weight.   HENT:      Head:      Comments: Right side forehead hematoma, right periorbital hematoma     Nose: Nose normal. No congestion or rhinorrhea.      Mouth/Throat:      Mouth: Mucous membranes are moist.      Pharynx: Oropharynx is clear.   Eyes:      Extraocular Movements: Extraocular movements intact.      Conjunctiva/sclera: Conjunctivae normal.      Pupils: Pupils are equal, round, and reactive to light.      Comments: Right periorbital swelling   Cardiovascular:      Rate and Rhythm: Normal rate and regular rhythm.      Heart sounds: Normal heart sounds. No murmur heard.  Pulmonary:      Effort: Pulmonary effort is normal.      Breath sounds: Normal breath sounds. No rhonchi or rales.   Abdominal:      General: Abdomen is flat. Bowel sounds are normal.      Palpations: Abdomen is soft.   Musculoskeletal:         General: No swelling or tenderness. Normal range of motion.      Cervical back: Normal range of motion and neck supple.   Skin:     General: Skin is warm and dry.      Capillary Refill: Capillary refill takes less than 2 seconds.   Neurological:      General: No focal deficit present.      Mental Status: She is alert and oriented to person, place, and time. Mental status is at baseline.         Procedures       Lab Results (last 24 hours)     ** No results found for the last 24 hours. **      CT Head Without Contrast    Result Date: 11/12/2022  EXAMINATION: CT head without contrast 11/12/2022  DOSE: 800 mGycm.  HISTORY: Fall with right periorbital facial injury and head injury.  FINDINGS: Today's exam is compared to previous  study of 04/06/2022. Multiple contiguous axial images are obtained from the skull base to the vertex per protocol without intravenous contrast enhancement with reformatted images obtained in the sagittal and coronal projections from the original data set.  There is atrophy of the brain with prominence of the subarachnoid spaces and ventricular enlargement. Small vessel ischemic changes are noted involving the periventricular and higher white matter tracts. There is evidence of remote right MCA territory infarct or posttraumatic change with encephalomalacia within the right frontal, temporal and parietal lobe. There is associated ex vacuo enlargement of the right lateral ventricle. No evidence of acute infarct or hemorrhage. Within the right posterior parietal vertex again demonstrated is an extra-axial partially calcified parafalcine mass consistent with a meningioma. Measures approximately 2.3 x 1.8 cm in size and has not changed significantly from previous exam of 04/06/2022.  Right frontal and periorbital scalp hematoma is present. There is no associated calvarial injury. No evidence of intra or extra-axial hemorrhage.      1.. Right frontal and periorbital scalp hematoma with no associated calvarial injury. No evidence of acute posttraumatic injury to the brain. 2. Atrophy with small vessel disease. Remote right MCA territory infarctions or posttraumatic change are present with encephalomalacia/volume loss and ex vacuo enlargement of the right lateral ventricle. 3. Right posterior parietal vertex parafalcine partially calcified meningioma previously documented on CT exam of 04/06/2022 as well as MRI dated 07/26/2022. This report was finalized on 11/12/2022 06:20 by Dr. Mk Anderson MD.    CT Maxillofacial Without Contrast    Result Date: 11/12/2022  EXAMINATION: CT the maxillofacial bones without contrast 11/12/2022  DOSE: 385 mGycm. All CT scans are performed using dose optimization techniques as  appropriate to the performed exam and including at least one of the following: Automated exposure control, adjustment of the mA and/or kV according to size, and the use of the iterative reconstruction technique..  HISTORY: Fall with right periorbital facial injury.  FINDINGS: Multiple contiguous axial images are obtained through the facial bones per protocol without intravenous contrast-enhancement with reformatted images obtained in the sagittal and coronal projections from the original data set.  A right supraorbital and frontal scalp hematoma is present with no associated calvarial injury. There is also associated mild right periorbital soft tissue swelling. No facial fractures are present. The visualized paranasal sinuses are normally aerated with no evidence of posttraumatic fluid.      1.. No evidence of acute facial fracture. 2. Right supraorbital and periorbital soft tissue hematoma. No associated calvarial injury. The orbits are intact. This report was finalized on 11/12/2022 06:25 by Dr. Mk Anderson MD.      ED Course  ED Course as of 11/12/22 0649   Sat Nov 12, 2022   0648 CT negative for intracranial bleeding, calvarial injury, facial fracture. [AW]      ED Course User Index  [AW] Santosh Mendez MD                                           [facility]    Final diagnoses:   Minor head injury, initial encounter   Periorbital hematoma of right eye       ED Disposition  ED Disposition     ED Disposition   Discharge    Condition   Stable    Comment   --             Judith Mauro MD  [address] [place] [address] [address] [place] [address]  [phone]    Schedule an appointment as soon as possible for a visit   As needed         Medication List      No changes were made to your prescriptions during this visit.          Santosh Mendez MD  11/12/22 0649

## 2022-11-23 ENCOUNTER — TELEPHONE (OUTPATIENT)
Dept: PODIATRY | Facility: CLINIC | Age: 47
End: 2022-11-23

## 2022-12-02 ENCOUNTER — TELEPHONE (OUTPATIENT)
Dept: PODIATRY | Facility: CLINIC | Age: 47
End: 2022-12-02

## 2022-12-02 NOTE — TELEPHONE ENCOUNTER
Called patient to confirmed appointment for 12/05/2022 @ 6273 with Castillo RICHARDSON. Patient will be here for appointment.

## 2022-12-05 ENCOUNTER — OFFICE VISIT (OUTPATIENT)
Dept: PODIATRY | Facility: CLINIC | Age: 47
End: 2022-12-05

## 2022-12-05 VITALS
HEIGHT: 69 IN | HEART RATE: 52 BPM | OXYGEN SATURATION: 93 % | BODY MASS INDEX: 32.63 KG/M2 | WEIGHT: 220.3 LBS | DIASTOLIC BLOOD PRESSURE: 89 MMHG | SYSTOLIC BLOOD PRESSURE: 120 MMHG

## 2022-12-05 DIAGNOSIS — L84 PRE-ULCERATIVE CALLUSES: ICD-10-CM

## 2022-12-05 DIAGNOSIS — L60.3 DYSTROPHIC NAIL: ICD-10-CM

## 2022-12-05 DIAGNOSIS — B35.1 ONYCHOMYCOSIS: Primary | ICD-10-CM

## 2022-12-05 DIAGNOSIS — E11.42 TYPE 2 DIABETES MELLITUS WITH DIABETIC POLYNEUROPATHY, WITHOUT LONG-TERM CURRENT USE OF INSULIN: ICD-10-CM

## 2022-12-05 DIAGNOSIS — S06.9X9S TRAUMATIC BRAIN INJURY WITH LOSS OF CONSCIOUSNESS, SEQUELA: ICD-10-CM

## 2022-12-05 DIAGNOSIS — M21.371 FOOT DROP, RIGHT: ICD-10-CM

## 2022-12-05 PROCEDURE — 11721 DEBRIDE NAIL 6 OR MORE: CPT | Performed by: NURSE PRACTITIONER

## 2022-12-05 PROCEDURE — 11055 PARING/CUTG B9 HYPRKER LES 1: CPT | Performed by: NURSE PRACTITIONER

## 2022-12-05 RX ORDER — SEMAGLUTIDE 1.34 MG/ML
1 INJECTION, SOLUTION SUBCUTANEOUS WEEKLY
COMMUNITY
Start: 2022-11-22

## 2022-12-19 ENCOUNTER — APPOINTMENT (OUTPATIENT)
Dept: CT IMAGING | Facility: HOSPITAL | Age: 47
End: 2022-12-19

## 2022-12-19 ENCOUNTER — HOSPITAL ENCOUNTER (EMERGENCY)
Facility: HOSPITAL | Age: 47
Discharge: HOME | End: 2022-12-19
Attending: EMERGENCY MEDICINE | Admitting: EMERGENCY MEDICINE

## 2022-12-19 VITALS
SYSTOLIC BLOOD PRESSURE: 127 MMHG | BODY MASS INDEX: 32.58 KG/M2 | DIASTOLIC BLOOD PRESSURE: 92 MMHG | RESPIRATION RATE: 17 BRPM | HEIGHT: 69 IN | TEMPERATURE: 97.7 F | OXYGEN SATURATION: 98 % | HEART RATE: 86 BPM | WEIGHT: 220 LBS

## 2022-12-19 DIAGNOSIS — S00.93XA CONTUSION OF HEAD, UNSPECIFIED PART OF HEAD, INITIAL ENCOUNTER: Primary | ICD-10-CM

## 2022-12-19 PROCEDURE — 99284 EMERGENCY DEPT VISIT MOD MDM: CPT

## 2022-12-19 PROCEDURE — 70450 CT HEAD/BRAIN W/O DYE: CPT

## 2022-12-19 PROCEDURE — 99283 EMERGENCY DEPT VISIT LOW MDM: CPT
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
## 2023-01-01 ENCOUNTER — APPOINTMENT (OUTPATIENT)
Dept: GENERAL RADIOLOGY | Facility: HOSPITAL | Age: 48
End: 2023-01-01
Payer: MEDICAID

## 2023-01-01 ENCOUNTER — APPOINTMENT (OUTPATIENT)
Dept: CT IMAGING | Facility: HOSPITAL | Age: 48
End: 2023-01-01
Payer: MEDICAID

## 2023-01-01 ENCOUNTER — HOSPITAL ENCOUNTER (EMERGENCY)
Facility: HOSPITAL | Age: 48
Discharge: HOME | End: 2023-01-01
Admitting: STUDENT IN AN ORGANIZED HEALTH CARE EDUCATION/TRAINING PROGRAM
Payer: MEDICAID

## 2023-01-01 VITALS
HEART RATE: 78 BPM | RESPIRATION RATE: 18 BRPM | OXYGEN SATURATION: 99 % | DIASTOLIC BLOOD PRESSURE: 78 MMHG | SYSTOLIC BLOOD PRESSURE: 132 MMHG | TEMPERATURE: 98 F | HEIGHT: 69 IN | BODY MASS INDEX: 33.18 KG/M2 | WEIGHT: 224 LBS

## 2023-01-01 DIAGNOSIS — S63.259A DISLOCATION OF FINGER, INITIAL ENCOUNTER: ICD-10-CM

## 2023-01-01 DIAGNOSIS — S01.81XA FACIAL LACERATION, INITIAL ENCOUNTER: ICD-10-CM

## 2023-01-01 DIAGNOSIS — S09.90XA INJURY OF HEAD, INITIAL ENCOUNTER: ICD-10-CM
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
## 2023-01-01 PROCEDURE — 72125 CT NECK SPINE W/O DYE: CPT

## 2023-01-01 PROCEDURE — 0 LIDOCAINE 1 % SOLUTION: Performed by: NURSE PRACTITIONER

## 2023-01-01 PROCEDURE — 99283 EMERGENCY DEPT VISIT LOW MDM: CPT

## 2023-01-01 PROCEDURE — 70450 CT HEAD/BRAIN W/O DYE: CPT

## 2023-01-01 PROCEDURE — 73130 X-RAY EXAM OF HAND: CPT

## 2023-01-01 PROCEDURE — 70486 CT MAXILLOFACIAL W/O DYE: CPT

## 2023-01-01 RX ORDER — LIDOCAINE HYDROCHLORIDE 10 MG/ML
10 INJECTION, SOLUTION INFILTRATION; PERINEURAL ONCE
Status: COMPLETED | OUTPATIENT
Start: 2023-01-01 | End: 2023-01-01

## 2023-01-01 RX ADMIN — LIDOCAINE HYDROCHLORIDE 10 ML: 10 INJECTION, SOLUTION INFILTRATION; PERINEURAL at 16:55
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
## 2023-01-02 NOTE — DISCHARGE INSTRUCTIONS
Drink plenty of fluid.  Continue home medication. Follow closed head injury instructions.  You can wash areas to face 2-3 times a day with antibacterial soap and apply antibiotic joint.  Sutures out in 7 days.  Splint in place until you see orthopedics.  Dr Plunkett is on call. Follow up with PCP - call tomorrow for appointment. Return to ED if condition does not improve or worsens

## 2023-03-03 ENCOUNTER — TELEPHONE (OUTPATIENT)
Dept: PODIATRY | Facility: CLINIC | Age: 48
End: 2023-03-03
Payer: MEDICAID

## 2023-03-06 NOTE — PROGRESS NOTES
[facility] - PODIATRY    Today's Date: 23    Patient Name: Zahida Geller  MRN: [id number]  CSN: [id number]  PCP: Judith Mauro MD  Referring Provider: No ref. provider found    SUBJECTIVE     Chief Complaint   Patient presents with   • Follow-up     Judith Mauro MD -2022 3 month diabetic nail care- pt states feet doing ok- pt denies pain   • Diabetes     159mg/dl BG after lunch today     HPI: Zahida Geller, a 47 y.o.female, comes to clinic as a(n) established patient presenting for diabetic foot exam, complaining of painful toenails and callus. Patient has h/o anxiety, asthma, back pain, depression, DM2, edema, HTN, TBI, previous substance abuse.  Patient has had return of callus of the right hallux. Due to neuropathy and nail changes she is uncomfortable cutting her own nails. States that nails are thickened and irregular and that she is unable to care for them herself. Patient is NIDDM with last stated BG level of 159mg/dl. States that she has area of right lower leg that has been open for a couple of months and has treated at home with ointment. Wears compression routinely.  Denies pain today. Denies any constitutional symptoms. No other pedal complaints at this time.    Past Medical History:   Diagnosis Date   • Anxiety    • Asthma    • Chronic back pain    • Chronic hip pain    • Depression    • Diabetes mellitus (HCC)    • Edema of both legs    • Hypertension    • Meningioma (HCC)    • Restless leg syndrome    • Substance abuse in remission (HCC)    • Traumatic brain injury      Past Surgical History:   Procedure Laterality Date   • BACK SURGERY      L5-L6 surgery   •  SECTION     • LEG SURGERY      Left leg jose ramon placement   • PEG TUBE INSERTION     • PEG TUBE REMOVAL     • TRACHEOSTOMY      with reversal   • TUBAL ABDOMINAL LIGATION       Family History   Problem Relation Age of Onset   • Heart murmur Mother    • Heart disease Father    • No Known  Problems Brother    • No Known Problems Sister    • No Known Problems Brother      Social History     Socioeconomic History   • Marital status: Single   Tobacco Use   • Smoking status: Former     Packs/day: 0.00     Types: Electronic Cigarette, Cigarettes     Passive exposure: Past   • Smokeless tobacco: Never   Vaping Use   • Vaping Use: Former   Substance and Sexual Activity   • Alcohol use: No   • Drug use: No     Comment: history of, narcotics   • Sexual activity: Never     Partners: Male     Birth control/protection: Injection     Comment: last act of intercourse a year ago     Allergies   Allergen Reactions   • Penicillins Other (See Comments)     As a child   • Tramadol Nausea And Vomiting     Current Outpatient Medications   Medication Sig Dispense Refill   • albuterol (PROVENTIL HFA;VENTOLIN HFA) 108 (90 Base) MCG/ACT inhaler Inhale 2 puffs Every 4 (Four) Hours As Needed.     • amitriptyline (ELAVIL) 50 MG tablet Take 3 tablets by mouth Every Night.     • Blood Glucose Monitoring Suppl (FREESTYLE FREEDOM LITE) w/Device kit      • budesonide-formoterol (SYMBICORT) 80-4.5 MCG/ACT inhaler Inhale 2 puffs 2 (Two) Times a Day.     • calcium carbonate (TUMS) 500 MG chewable tablet Chew 1 tablet Daily.     • CBD oil (cannabidiol) capsule Take 1 capsule by mouth Daily.     • Cold Sore Products (GNP Cold Sore Treatment) 0.13 % liquid 1 g Every 12 (Twelve) Hours.     • dextromethorphan-guaifenesin (ROBITUSSIN-DM)  MG/5ML syrup Take 5 mL by mouth Every 12 (Twelve) Hours.     • docosanol (ABREVA) 10 % cream cream Apply 1 application topically to the appropriate area as directed 5 (Five) Times a Day.     • docusate sodium (COLACE) 100 MG capsule Take 2 capsules by mouth Daily As Needed.     • esomeprazole (nexIUM) 20 MG capsule Take 1 capsule by mouth Every Morning Before Breakfast.     • fluticasone (FLONASE) 50 MCG/ACT nasal spray 2 sprays into the nostril(s) as directed by provider Daily.     • furosemide  "(LASIX) 40 MG tablet Take 1 tablet by mouth Daily.     • guaiFENesin (HUMIBID 3) 400 MG tablet Take 1 tablet by mouth Every 4 (Four) Hours As Needed.     • ibuprofen (ADVIL,MOTRIN) 800 MG tablet Take 1 tablet by mouth Every 8 (Eight) Hours As Needed.     • Insulin Syringe-Needle U-100 30G X 5/16\" 0.5 ML misc      • latanoprost (XALATAN) 0.005 % ophthalmic solution Administer 1 drop to both eyes Every Night.     • lidocaine (LIDODERM) 5 % Place 1 patch on the skin as directed by provider Daily As Needed for Moderate Pain. Remove & Discard patch within 12 hours or as directed by MD     • loratadine (CLARITIN) 10 MG tablet Take 1 tablet by mouth Daily.     • LORazepam (ATIVAN) 1 MG tablet Take 1 tablet by mouth Every 8 (Eight) Hours As Needed for Anxiety. 30 tablet 0   • magnesium hydroxide (MILK OF MAGNESIA) 2400 MG/10ML suspension suspension Take 10 mL by mouth Daily As Needed.     • medroxyPROGESTERone (DEPO-PROVERA) 150 MG/ML injection Inject 1 mL into the appropriate muscle as directed by prescriber Every 3 (Three) Months.     • Melatonin 3 MG capsule Take 1 capsule by mouth Every Night.     • Menthol (HALLS COUGH DROPS MT) Apply 1 drop to the mouth or throat As Needed.     • metFORMIN (GLUCOPHAGE) 850 MG tablet Take 1 tablet by mouth 3 (Three) Times a Day.     • metoprolol tartrate (LOPRESSOR) 100 MG tablet Take 1 tablet by mouth 2 (Two) Times a Day.     • nystatin-triamcinolone (MYCOLOG II) 708586-6.1 UNIT/GM-% cream Apply  topically to the appropriate area as directed 2 (Two) Times a Day.     • oxyCODONE-acetaminophen (PERCOCET)  MG per tablet Take 1 tablet by mouth Every 8 (Eight) Hours As Needed.     • Ozempic, 0.25 or 0.5 MG/DOSE, 2 MG/1.5ML solution pen-injector INJECT 0.25 MG BELOW THE SKIN ONCE WEEKLY FOR 4 WEEKS THEN INCREASE TO 0.5 MG ONCE WEEKLY     • PARoxetine (PAXIL) 40 MG tablet Take 1 tablet by mouth Every Morning.     • phenol (CHLORASEPTIC) 1.4 % liquid liquid Apply 5 sprays to the mouth " or throat As Needed.     • pregabalin (LYRICA) 100 MG capsule Take 1 capsule by mouth 3 (Three) Times a Day.     • propranolol (INDERAL) 20 MG tablet Take 1 tablet by mouth 3 (Three) Times a Day.     • SITagliptin (JANUVIA) 100 MG tablet Take 1 tablet by mouth Daily.     • tiZANidine (ZANAFLEX) 4 MG tablet Take 1 tablet by mouth Every 8 (Eight) Hours As Needed.       No current facility-administered medications for this visit.     Review of Systems   Constitutional: Negative for chills and fever.   HENT: Negative for congestion.    Respiratory: Negative for shortness of breath.    Cardiovascular: Positive for leg swelling. Negative for chest pain.   Gastrointestinal: Negative for constipation, diarrhea, nausea and vomiting.   Musculoskeletal: Positive for arthralgias and gait problem.   Skin: Positive for wound.        Preulcerative callus to right hallux   Neurological: Positive for numbness.       OBJECTIVE     Vitals:    03/08/23 1446   BP: 120/64   Pulse: 101   SpO2: 98%       PHYSICAL EXAM  Accompanied by transporter.    Foot/Ankle Exam:       General:   Diabetic Foot Exam Performed    Appearance: appears stated age and healthy    Orientation: AAOx3    Affect: appropriate    Gait: antalgic    Assistance: independent    Shoe Gear:  Casual shoes    VASCULAR      Right Foot Vascularity   Dorsalis pedis:  2+  Posterior tibial:  2+  Skin Temperature: warm    Edema Grading:  Trace and non-pitting  CFT:  3  Pedal Hair Growth:  Present  Varicosities: mild varicosities       Left Foot Vascularity   Dorsalis pedis:  2+  Posterior tibial:  2+  Skin Temperature: warm    Edema Grading:  Non-pitting and trace  CFT:  3  Pedal Hair Growth:  Present  Varicosities: mild varicosities        NEUROLOGIC     Right Foot Neurologic   Light touch sensation:  Diminished  Vibratory sensation:  Diminished  Hot/Cold sensation: diminished    Protective Sensation using [place] Monofilament:  7     Left Foot Neurologic   Light touch  sensation:  Diminished  Vibratory sensation:  Diminished  Hot/cold sensation: diminished    Protective Sensation using [place]-Luis Monofilament:  7     MUSCULOSKELETAL      Right Foot Musculoskeletal   Ecchymosis:  None  Tenderness: right foot callus and toenails    Arch:  Normal  Hallux valgus: No    Hallux limitus: No       Left Foot Musculoskeletal   Ecchymosis:  None  Tenderness: toenails    Arch:  Normal  Hallux valgus: No    Hallux limitus: No       MUSCLE STRENGTH     Right Foot Muscle Strength   Foot dorsiflexion:  3  Foot plantar flexion:  5  Foot inversion:  5  Foot eversion:  5     Left Foot Muscle Strength   Foot dorsiflexion:  5  Foot plantar flexion:  5  Foot inversion:  5  Foot eversion:  5     RANGE OF MOTION      Right Foot Range of Motion   Foot and ankle ROM within normal limits       Left Foot Range of Motion   Foot and ankle ROM within normal limits       DERMATOLOGIC     Right Foot Dermatologic   Skin: corn and ulcer    Nails: onychomycosis, abnormally thick, dystrophic nails and absent (hallux)       Left Foot Dermatologic   Skin: corn    Nails: onychomycosis, abnormally thick and dystrophic nails       Image:       RADIOLOGY/NUCLEAR:  No results found.    LABORATORY/CULTURE RESULTS:      PATHOLOGY RESULTS:       ASSESSMENT/PLAN     Diagnoses and all orders for this visit:    1. Venous stasis ulcer of other part of right lower leg with fat layer exposed with varicose veins (HCC) (Primary)  -     Ambulatory Referral to Wound Clinic    2. Onychomycosis    3. Dystrophic nail    4. Pre-ulcerative calluses    5. Foot drop, right    6. Type 2 diabetes mellitus with diabetic polyneuropathy, without long-term current use of insulin ([facility])    7. Traumatic brain injury with loss of consciousness, sequela ([facility])    8. Encounter for diabetic foot exam ([facility])      Comprehensive lower extremity examination and evaluation was performed.  Discussed findings and treatment plan including risks, benefits, and  treatment options with patient in detail. Patient agreed with treatment plan.  Diabetic foot exam performed.  After verbal consent obtained, nail(s) x9 debrided of length and thickness with nail nipper without incidence  After verbal consent obtained, calluses x1 pared utilizing dermal curette and/or scalpel without incidence  Patient may maintain nails and calluses at home utilizing emery board or pumice stone between visits as needed  Reviewed at home diabetic foot care including daily foot checks   Continue monitoring BG and control under direction of PCP.  Continue compression stockings and elevation of lower extremities.   Continue AFO.   Refer to OP wound care for evaluation and treatment of venous stasis ulcer to right leg.  Keep triple abx and band-aid over right hallux for 1 week to assist in further prevention of ulcer and treatment of slight skin breakdown with paring.   An After Visit Summary was printed and given to the patient at discharge, including (if requested) any available informative/educational handouts regarding diagnosis, treatment, or medications. All questions were answered to patient/family satisfaction. Should symptoms fail to improve or worsen they agree to call or return to clinic or to go to the Emergency Department. Discussed the importance of following up with any needed screening tests/labs/specialist appointments and any requested follow-up recommended by me today. Importance of maintaining follow-up discussed and patient accepts that missed appointments can delay diagnosis and potentially lead to worsening of conditions.  Return in about 3 months (around 6/8/2023)., or sooner if acute issues arise.          This document has been electronically signed by DEBRA Kraus on March 8, 2023 16:01 CST

## 2023-03-07 ENCOUNTER — TELEPHONE (OUTPATIENT)
Dept: PODIATRY | Facility: CLINIC | Age: 48
End: 2023-03-07
Payer: MEDICAID

## 2023-03-07 NOTE — TELEPHONE ENCOUNTER
Called patient regarding appt on 03/08/2203. Left message for patient to return call if any questions or concerns arise.

## 2023-03-08 ENCOUNTER — OFFICE VISIT (OUTPATIENT)
Dept: PODIATRY | Facility: CLINIC | Age: 48
End: 2023-03-08
Payer: MEDICAID

## 2023-03-08 VITALS
HEIGHT: 69 IN | DIASTOLIC BLOOD PRESSURE: 64 MMHG | HEART RATE: 101 BPM | OXYGEN SATURATION: 98 % | BODY MASS INDEX: 32.29 KG/M2 | WEIGHT: 218 LBS | SYSTOLIC BLOOD PRESSURE: 120 MMHG

## 2023-03-08 DIAGNOSIS — S06.9X9S TRAUMATIC BRAIN INJURY WITH LOSS OF CONSCIOUSNESS, SEQUELA: ICD-10-CM

## 2023-03-08 DIAGNOSIS — I83.018 VENOUS STASIS ULCER OF OTHER PART OF RIGHT LOWER LEG WITH FAT LAYER EXPOSED WITH VARICOSE VEINS: Primary | ICD-10-CM

## 2023-03-08 DIAGNOSIS — L84 PRE-ULCERATIVE CALLUSES: ICD-10-CM

## 2023-03-08 DIAGNOSIS — E11.9 ENCOUNTER FOR DIABETIC FOOT EXAM: ICD-10-CM

## 2023-03-08 DIAGNOSIS — B35.1 ONYCHOMYCOSIS: ICD-10-CM

## 2023-03-08 DIAGNOSIS — L60.3 DYSTROPHIC NAIL: ICD-10-CM

## 2023-03-08 DIAGNOSIS — E11.42 TYPE 2 DIABETES MELLITUS WITH DIABETIC POLYNEUROPATHY, WITHOUT LONG-TERM CURRENT USE OF INSULIN: ICD-10-CM

## 2023-03-08 DIAGNOSIS — M21.371 FOOT DROP, RIGHT: ICD-10-CM

## 2023-03-08 DIAGNOSIS — L97.812 VENOUS STASIS ULCER OF OTHER PART OF RIGHT LOWER LEG WITH FAT LAYER EXPOSED WITH VARICOSE VEINS: Primary | ICD-10-CM

## 2023-03-08 PROCEDURE — 11055 PARING/CUTG B9 HYPRKER LES 1: CPT | Performed by: NURSE PRACTITIONER

## 2023-03-08 PROCEDURE — 1160F RVW MEDS BY RX/DR IN RCRD: CPT | Performed by: NURSE PRACTITIONER

## 2023-03-08 PROCEDURE — 11721 DEBRIDE NAIL 6 OR MORE: CPT | Performed by: NURSE PRACTITIONER

## 2023-03-08 PROCEDURE — 1159F MED LIST DOCD IN RCRD: CPT | Performed by: NURSE PRACTITIONER

## 2023-03-08 PROCEDURE — 99213 OFFICE O/P EST LOW 20 MIN: CPT | Performed by: NURSE PRACTITIONER

## 2023-03-08 PROCEDURE — 3074F SYST BP LT 130 MM HG: CPT | Performed by: NURSE PRACTITIONER

## 2023-03-08 PROCEDURE — 3078F DIAST BP <80 MM HG: CPT | Performed by: NURSE PRACTITIONER

## 2023-03-14 ENCOUNTER — OFFICE VISIT (OUTPATIENT)
Dept: WOUND CARE | Facility: HOSPITAL | Age: 48
End: 2023-03-14
Payer: MEDICAID

## 2023-03-14 DIAGNOSIS — L97.812 NON-PRESSURE CHRONIC ULCER OF OTHER PART OF RIGHT LOWER LEG WITH FAT LAYER EXPOSED: ICD-10-CM

## 2023-03-14 DIAGNOSIS — I10 ESSENTIAL (PRIMARY) HYPERTENSION: ICD-10-CM

## 2023-03-14 DIAGNOSIS — L97.511 NON-PRESSURE CHRONIC ULCER OF OTHER PART OF RIGHT FOOT LIMITED TO BREAKDOWN OF SKIN: ICD-10-CM

## 2023-03-14 DIAGNOSIS — L98.499 TYPE 2 DIABETES MELLITUS WITH OTHER SKIN ULCER, UNSPECIFIED WHETHER LONG TERM INSULIN USE: ICD-10-CM

## 2023-03-14 DIAGNOSIS — E11.622 TYPE 2 DIABETES MELLITUS WITH OTHER SKIN ULCER, UNSPECIFIED WHETHER LONG TERM INSULIN USE: ICD-10-CM

## 2023-03-14 PROCEDURE — G0463 HOSPITAL OUTPT CLINIC VISIT: HCPCS

## 2023-03-15 ENCOUNTER — TRANSCRIBE ORDERS (OUTPATIENT)
Dept: ADMINISTRATIVE | Facility: HOSPITAL | Age: 48
End: 2023-03-15
Payer: MEDICAID

## 2023-03-15 DIAGNOSIS — R60.0 LOCALIZED EDEMA: Primary | ICD-10-CM

## 2023-03-17 ENCOUNTER — TELEPHONE (OUTPATIENT)
Dept: NEUROLOGY | Age: 48
End: 2023-03-17

## 2023-03-17 NOTE — TELEPHONE ENCOUNTER
Call was placed to patient's residence [facility], spoke with Harley Stephens the  to confirm appointment. Requested to bring disc to appointment. Will have  stop by imaging on the way to appointment per Harley Stephens.

## 2023-03-20 ENCOUNTER — OFFICE VISIT (OUTPATIENT)
Dept: NEUROSURGERY | Age: 48
End: 2023-03-20
Payer: MEDICAID

## 2023-03-20 ENCOUNTER — TELEPHONE (OUTPATIENT)
Dept: NEUROSURGERY | Age: 48
End: 2023-03-20

## 2023-03-20 VITALS
HEART RATE: 80 BPM | WEIGHT: 220 LBS | HEIGHT: 69 IN | SYSTOLIC BLOOD PRESSURE: 130 MMHG | DIASTOLIC BLOOD PRESSURE: 85 MMHG | RESPIRATION RATE: 18 BRPM | BODY MASS INDEX: 32.58 KG/M2

## 2023-03-20 DIAGNOSIS — Z87.820 HISTORY OF TRAUMATIC BRAIN INJURY: ICD-10-CM

## 2023-03-20 DIAGNOSIS — D32.9 MENINGIOMA (HCC): Primary | ICD-10-CM

## 2023-03-20 PROCEDURE — 99213 OFFICE O/P EST LOW 20 MIN: CPT | Performed by: NEUROLOGICAL SURGERY

## 2023-03-20 ASSESSMENT — ENCOUNTER SYMPTOMS
RESPIRATORY NEGATIVE: 1
GASTROINTESTINAL NEGATIVE: 1
EYES NEGATIVE: 1
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
## 2023-03-20 NOTE — PROGRESS NOTES
Review of Systems   Constitutional: Negative. HENT: Negative. Eyes: Negative. Respiratory: Negative. Cardiovascular: Negative. Gastrointestinal: Negative. Genitourinary: Negative. Musculoskeletal:  Positive for falls, joint pain and myalgias. Skin: Negative. Neurological:  Positive for tingling and weakness. Endo/Heme/Allergies: Negative. Psychiatric/Behavioral: Negative.

## 2023-03-20 NOTE — TELEPHONE ENCOUNTER
----- Message from [facility] sent at 3/20/2023 11:55 AM CDT -----  Regarding: Valium for MRI  Patient stated that she would need something for the MRI. She said it was discussed at previous appt. MRI scheduled for 4/10/23.   [facility]

## 2023-03-21 ENCOUNTER — OFFICE VISIT (OUTPATIENT)
Dept: WOUND CARE | Facility: HOSPITAL | Age: 48
End: 2023-03-21
Payer: MEDICAID

## 2023-03-21 ENCOUNTER — LAB REQUISITION (OUTPATIENT)
Dept: LAB | Facility: HOSPITAL | Age: 48
End: 2023-03-21
Payer: MEDICAID

## 2023-03-21 DIAGNOSIS — L97.511 NON-PRESSURE CHRONIC ULCER OF OTHER PART OF RIGHT FOOT LIMITED TO BREAKDOWN OF SKIN: ICD-10-CM

## 2023-03-21 DIAGNOSIS — E11.622 TYPE 2 DIABETES MELLITUS WITH OTHER SKIN ULCER, UNSPECIFIED WHETHER LONG TERM INSULIN USE: ICD-10-CM

## 2023-03-21 DIAGNOSIS — Z00.00 ENCOUNTER FOR GENERAL ADULT MEDICAL EXAMINATION WITHOUT ABNORMAL FINDINGS: ICD-10-CM

## 2023-03-21 DIAGNOSIS — L98.499 TYPE 2 DIABETES MELLITUS WITH OTHER SKIN ULCER, UNSPECIFIED WHETHER LONG TERM INSULIN USE: ICD-10-CM

## 2023-03-21 DIAGNOSIS — L97.812 NON-PRESSURE CHRONIC ULCER OF OTHER PART OF RIGHT LOWER LEG WITH FAT LAYER EXPOSED: ICD-10-CM

## 2023-03-21 DIAGNOSIS — I10 ESSENTIAL (PRIMARY) HYPERTENSION: ICD-10-CM

## 2023-03-21 PROCEDURE — 87186 SC STD MICRODIL/AGAR DIL: CPT | Performed by: SURGERY

## 2023-03-21 PROCEDURE — 87075 CULTR BACTERIA EXCEPT BLOOD: CPT | Performed by: SURGERY

## 2023-03-21 PROCEDURE — 87147 CULTURE TYPE IMMUNOLOGIC: CPT | Performed by: SURGERY

## 2023-03-21 PROCEDURE — 87070 CULTURE OTHR SPECIMN AEROBIC: CPT | Performed by: SURGERY

## 2023-03-21 PROCEDURE — 87205 SMEAR GRAM STAIN: CPT | Performed by: SURGERY

## 2023-03-21 PROCEDURE — 87176 TISSUE HOMOGENIZATION CULTR: CPT | Performed by: SURGERY

## 2023-03-21 PROCEDURE — G0463 HOSPITAL OUTPT CLINIC VISIT: HCPCS

## 2023-03-21 RX ORDER — DIAZEPAM 5 MG/1
5 TABLET ORAL
Qty: 2 TABLET | Refills: 0 | Status: SHIPPED | OUTPATIENT
Start: 2023-03-21 | End: 2024-03-01

## 2023-03-23 ENCOUNTER — HOSPITAL ENCOUNTER (OUTPATIENT)
Dept: ULTRASOUND IMAGING | Facility: HOSPITAL | Age: 48
Discharge: HOME | End: 2023-03-23
Admitting: SURGERY
Payer: MEDICAID

## 2023-03-23 DIAGNOSIS — R60.0 LOCALIZED EDEMA: ICD-10-CM

## 2023-03-23 LAB
BACTERIA SPEC AEROBE CULT: ABNORMAL
GRAM STN SPEC: ABNORMAL

## 2023-03-23 PROCEDURE — 93970 EXTREMITY STUDY: CPT | Performed by: SURGERY

## 2023-03-23 PROCEDURE — 93970 EXTREMITY STUDY: CPT

## 2023-03-26 LAB — BACTERIA SPEC ANAEROBE CULT: NORMAL

## 2023-03-28 ENCOUNTER — OFFICE VISIT (OUTPATIENT)
Dept: WOUND CARE | Facility: HOSPITAL | Age: 48
End: 2023-03-28
Payer: MEDICAID

## 2023-03-28 DIAGNOSIS — L97.511 NON-PRESSURE CHRONIC ULCER OF OTHER PART OF RIGHT FOOT LIMITED TO BREAKDOWN OF SKIN: ICD-10-CM

## 2023-03-28 DIAGNOSIS — L97.812 NON-PRESSURE CHRONIC ULCER OF OTHER PART OF RIGHT LOWER LEG WITH FAT LAYER EXPOSED: ICD-10-CM

## 2023-03-28 DIAGNOSIS — L98.499 TYPE 2 DIABETES MELLITUS WITH OTHER SKIN ULCER, UNSPECIFIED WHETHER LONG TERM INSULIN USE: ICD-10-CM

## 2023-03-28 DIAGNOSIS — E11.622 TYPE 2 DIABETES MELLITUS WITH OTHER SKIN ULCER, UNSPECIFIED WHETHER LONG TERM INSULIN USE: ICD-10-CM

## 2023-03-28 DIAGNOSIS — I10 ESSENTIAL (PRIMARY) HYPERTENSION: ICD-10-CM

## 2023-03-29 ENCOUNTER — APPOINTMENT (OUTPATIENT)
Dept: CT IMAGING | Age: 48
End: 2023-03-29
Payer: MEDICAID

## 2023-03-29 ENCOUNTER — HOSPITAL ENCOUNTER (EMERGENCY)
Age: 48
Discharge: HOME | End: 2023-03-29
Attending: EMERGENCY MEDICINE
Payer: MEDICAID

## 2023-03-29 ENCOUNTER — PREP FOR SURGERY (OUTPATIENT)
Dept: OTHER | Facility: HOSPITAL | Age: 48
End: 2023-03-29
Payer: MEDICAID

## 2023-03-29 VITALS
HEART RATE: 98 BPM | SYSTOLIC BLOOD PRESSURE: 122 MMHG | WEIGHT: 220 LBS | BODY MASS INDEX: 32.49 KG/M2 | OXYGEN SATURATION: 98 % | TEMPERATURE: 97.8 F | RESPIRATION RATE: 20 BRPM | DIASTOLIC BLOOD PRESSURE: 72 MMHG

## 2023-03-29 DIAGNOSIS — S09.90XA INJURY OF HEAD, INITIAL ENCOUNTER: ICD-10-CM

## 2023-03-29 DIAGNOSIS — L97.919 VARICOSE VEINS OF LOWER EXTREMITIES WITH ULCER AND INFLAMMATION: ICD-10-CM

## 2023-03-29 DIAGNOSIS — I83.229 VARICOSE VEINS OF LOWER EXTREMITIES WITH ULCER AND INFLAMMATION: ICD-10-CM

## 2023-03-29 DIAGNOSIS — S01.81XA LACERATION OF FOREHEAD, INITIAL ENCOUNTER: Primary | ICD-10-CM

## 2023-03-29 DIAGNOSIS — I83.219 VARICOSE VEINS OF LOWER EXTREMITIES WITH ULCER AND INFLAMMATION: ICD-10-CM
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
## 2023-03-29 DIAGNOSIS — I87.2 VENOUS (PERIPHERAL) INSUFFICIENCY: ICD-10-CM

## 2023-03-29 DIAGNOSIS — R93.0 ABNORMAL HEAD CT: ICD-10-CM

## 2023-03-29 DIAGNOSIS — L97.929 VARICOSE VEINS OF LOWER EXTREMITIES WITH ULCER AND INFLAMMATION: ICD-10-CM

## 2023-03-29 DIAGNOSIS — R60.0 EDEMA OF BOTH LOWER EXTREMITIES: ICD-10-CM

## 2023-03-29 DIAGNOSIS — I87.331 CHRONIC VENOUS HYPERTENSION WITH ULCER AND INFLAMMATION INVOLVING RIGHT SIDE: Primary | ICD-10-CM

## 2023-03-29 PROCEDURE — 99284 EMERGENCY DEPT VISIT MOD MDM: CPT

## 2023-03-29 PROCEDURE — 12011 RPR F/E/E/N/L/M 2.5 CM/<: CPT

## 2023-03-29 PROCEDURE — 70450 CT HEAD/BRAIN W/O DYE: CPT

## 2023-03-29 PROCEDURE — 72125 CT NECK SPINE W/O DYE: CPT

## 2023-03-29 PROCEDURE — 70486 CT MAXILLOFACIAL W/O DYE: CPT

## 2023-03-29 PROCEDURE — 72128 CT CHEST SPINE W/O DYE: CPT

## 2023-03-29 RX ORDER — LIDOCAINE HYDROCHLORIDE AND EPINEPHRINE 10; 10 MG/ML; UG/ML
20 INJECTION, SOLUTION INFILTRATION; PERINEURAL ONCE
Status: DISCONTINUED | OUTPATIENT
Start: 2023-03-29 | End: 2023-03-29 | Stop reason: HOSPADM

## 2023-03-29 RX ORDER — LAMOTRIGINE 25 MG/1
25 TABLET ORAL DAILY
COMMUNITY

## 2023-03-29 RX ORDER — LORAZEPAM 1 MG/1
1 TABLET ORAL EVERY 6 HOURS PRN
COMMUNITY

## 2023-03-29 RX ORDER — CLINDAMYCIN PHOSPHATE 900 MG/50ML
900 INJECTION INTRAVENOUS ONCE
OUTPATIENT
Start: 2023-03-29 | End: 2023-03-29

## 2023-03-29 RX ORDER — LIDOCAINE HYDROCHLORIDE 10 MG/ML
5 INJECTION, SOLUTION EPIDURAL; INFILTRATION; INTRACAUDAL; PERINEURAL ONCE
Status: DISCONTINUED | OUTPATIENT
Start: 2023-03-29 | End: 2023-03-29

## 2023-03-29 RX ORDER — LATANOPROST 50 UG/ML
1 SOLUTION/ DROPS OPHTHALMIC NIGHTLY
COMMUNITY

## 2023-03-29 ASSESSMENT — ENCOUNTER SYMPTOMS
VOMITING: 0
EYE PAIN: 0
BACK PAIN: 1
DIARRHEA: 0
SHORTNESS OF BREATH: 0
ABDOMINAL PAIN: 0

## 2023-03-29 NOTE — ED NOTES
Called [facility] and left message we have a patient that needs to be picked up.       Fernando White RN  03/29/23 7026

## 2023-03-29 NOTE — ED NOTES
Staff from [facility] called and said they will come pick patient up in a few minutes.       Kamini Encarnacion RN  03/29/23 5901

## 2023-03-29 NOTE — ED NOTES
Attempted to call [facility] for ride and there was no answer. Will call again at a later time.       Jo Camp RN  03/29/23 3067

## 2023-03-29 NOTE — ED PROVIDER NOTES
of sutures:  4  Approximation:     Approximation:  Close  Repair type:     Repair type:  Simple  Post-procedure details:     Dressing:  Open (no dressing)    Procedure completion:  Tolerated well, no immediate complications    FINAL IMPRESSION     1. Laceration of forehead, initial encounter    2. Injury of head, initial encounter    3. Fall, initial encounter    4.  Abnormal head CT          DISPOSITION/PLAN   DISPOSITION Decision To Discharge 03/29/2023 06:40:43 AM      PATIENT REFERRED TO:  @FUP@    DISCHARGE MEDICATIONS:  New Prescriptions    No medications on file          (Please note that portions of this note were completed with a voice recognition program.  Efforts were made to edit the dictations butoccasionally words are mis-transcribed.)    Karina De La Garza MD (electronically signed)  AttendingEmergency Physician          Karina De La Garza MD  03/29/23 [id number]

## 2023-03-29 NOTE — H&P
Zahida Geller  [id number]  [id number]  Room/bed info not found  No att. providers found  (Not on file)    CC: Venous insufficiency, right lower extremity wounds    HPI: This is a 47-year-old female with past history of traumatic brain injury with mild cognitive delay, hypertension, diabetes, and asthma.  [facility] with [facility].  She has been following with our wound care center here at [facility] due to superficial ulceration to the right medial malleolus region, as well as a wound to the right first toe.  She notes a chronic history of lower extremity edema that is worse with prolonged standing and somewhat improved with leg elevation and lying supine.  Due to her edema she has been wearing compression stockings in the 20 to 30 mmHg range for several months and also using leg elevation to try and help reduce her edema symptoms but despite this they persist.  Part of work-up through the wound care center included a venous duplex with insufficiency study which I reviewed.  It does show evidence of significant reflux in the bilateral greater saphenous veins, the bilateral lesser saphenous veins, and the right anterior accessory saphenous vein.  She also had some deep venous reflux in the right popliteal vein, and in the left common femoral vein.  Despite ongoing conservative management with compression, leg elevation, and exercise, as well as local wound care wounds persist.  She otherwise denies any arterial symptoms with no claudication or rest pain.  She has no other acute complaints.    Past Medical History:   Diagnosis Date   • Anxiety    • Asthma    • Chronic back pain    • Chronic hip pain    • Depression    • Diabetes mellitus (HCC)    • Edema of both legs    • Hypertension    • Meningioma (HCC)    • Restless leg syndrome    • Substance abuse in remission (HCC)    • Traumatic brain injury        Past Surgical History:   Procedure Laterality Date   • BACK SURGERY      L5-L6 surgery   •   SECTION     • LEG SURGERY      Left leg jose ramon placement   • PEG TUBE INSERTION     • PEG TUBE REMOVAL     • TRACHEOSTOMY      with reversal   • TUBAL ABDOMINAL LIGATION         Family History   Problem Relation Age of Onset   • Heart murmur Mother    • Heart disease Father    • No Known Problems Brother    • No Known Problems Sister    • No Known Problems Brother        Social History     Socioeconomic History   • Marital status: Single   Tobacco Use   • Smoking status: Former     Packs/day: 0.00     Types: Electronic Cigarette, Cigarettes     Passive exposure: Past   • Smokeless tobacco: Never   Vaping Use   • Vaping Use: Former   Substance and Sexual Activity   • Alcohol use: No   • Drug use: No     Comment: history of, narcotics   • Sexual activity: Never     Partners: Male     Birth control/protection: Injection     Comment: last act of intercourse a year ago       Allergies   Allergen Reactions   • Penicillins Other (See Comments)     As a child   • Tramadol Nausea And Vomiting       (Not in a hospital admission)      Review of Systems   Constitutional: Negative.  Negative for activity change, appetite change, chills, diaphoresis, fatigue and fever.   HENT: Negative.  Negative for congestion, sneezing, sore throat and trouble swallowing.    Eyes: Negative.  Negative for visual disturbance.   Respiratory: Negative.  Negative for chest tightness and shortness of breath.    Cardiovascular: Positive for leg swelling. Negative for chest pain and palpitations.   Gastrointestinal: Negative.  Negative for abdominal distention, abdominal pain, nausea and vomiting.   Endocrine: Negative.    Genitourinary: Negative.    Musculoskeletal: Negative.    Skin: Positive for wound.   Allergic/Immunologic: Negative.    Neurological: Negative.    Hematological: Negative.    Psychiatric/Behavioral: Negative.        There were no vitals taken for this visit.  Physical Exam  Vitals reviewed.   Constitutional:       Appearance: Normal  appearance.   HENT:      Head: Normocephalic and atraumatic.      Nose: Nose normal.      Mouth/Throat:      Mouth: Mucous membranes are moist.   Eyes:      Extraocular Movements: Extraocular movements intact.      Pupils: Pupils are equal, round, and reactive to light.   Cardiovascular:      Rate and Rhythm: Normal rate and regular rhythm.      Pulses: Normal pulses.           Carotid pulses are 2+ on the right side and 2+ on the left side.       Radial pulses are 2+ on the right side and 2+ on the left side.        Femoral pulses are 2+ on the right side and 2+ on the left side.       Popliteal pulses are 2+ on the right side and 2+ on the left side.        Dorsalis pedis pulses are 2+ on the right side and 2+ on the left side.        Posterior tibial pulses are 2+ on the right side and 2+ on the left side.      Comments: Edema of the bilateral lower extremities from ankle to knee.  Multiple small telangiectasias and varicosities throughout the bilateral lower legs.  She also has some hyperpigmentation consistent with stasis dermatitis to the bilateral lower legs.  She has a superficial ulceration to the right lower leg around the medial malleolus.  Some slough but otherwise some granulation.  She also has a small ulceration to the plantar aspect of the right great toe.  Pulmonary:      Effort: Pulmonary effort is normal. No respiratory distress.   Abdominal:      General: There is no distension.      Palpations: Abdomen is soft. There is no mass.      Tenderness: There is no abdominal tenderness.   Musculoskeletal:         General: Normal range of motion.      Cervical back: Normal range of motion and neck supple.      Right lower leg: Edema present.      Left lower leg: Edema present.   Skin:     General: Skin is warm and dry.      Capillary Refill: Capillary refill takes less than 2 seconds.   Neurological:      General: No focal deficit present.      Mental Status: She is alert and oriented to person, place,  and time.   Psychiatric:         Mood and Affect: Mood normal.         Behavior: Behavior normal.         Thought Content: Thought content normal.         Judgment: Judgment normal.         Lab Results (last 7 days)     ** No results found for the last 168 hours. **          Imaging Results (Last 7 Days)     ** No results found for the last 168 hours. **          Impression: 47-year-old female with superficial ulceration to the right medial malleolus consistent with a venous stasis ulcer, as well as a wound to the right first toe.  This is in the setting of significant reflux in the bilateral greater and lesser saphenous veins, as well as the right anterior accessory saphenous vein.  Given her persistent wounds and continued reflux symptoms with edema, heaviness, and discomfort despite continued conservative management with compression of the 20 to 30 mmHg range, leg elevation, and exercise over greater than 4 weeks I think she would benefit at this time from right greater saphenous vein and anterior accessory saphenous vein radiofrequency ablation to reduce symptoms and assist in wound healing. Risks of radiofrequency ablation include, but are not limited to, bleeding, infection, vessel damage, nerve damage, DVT, phlebitis, and pulmonary embolus.  The patient understands these risks and wishes to proceed with procedure.  In the meantime she should continue with local care of her wound through the wound care center along with continued compression the 20 to 30 mmHg range, leg elevation, and exercise to help reduce edema and reflux symptoms.    Douglas Cabrera MD

## 2023-04-04 ENCOUNTER — OFFICE VISIT (OUTPATIENT)
Dept: WOUND CARE | Facility: HOSPITAL | Age: 48
End: 2023-04-04
Payer: MEDICAID

## 2023-04-04 DIAGNOSIS — L97.509 TYPE 2 DIABETES MELLITUS WITH FOOT ULCER, UNSPECIFIED WHETHER LONG TERM INSULIN USE: ICD-10-CM

## 2023-04-04 DIAGNOSIS — L97.511 NON-PRESSURE CHRONIC ULCER OF OTHER PART OF RIGHT FOOT LIMITED TO BREAKDOWN OF SKIN: ICD-10-CM

## 2023-04-04 DIAGNOSIS — E11.621 TYPE 2 DIABETES MELLITUS WITH FOOT ULCER, UNSPECIFIED WHETHER LONG TERM INSULIN USE: ICD-10-CM

## 2023-04-04 DIAGNOSIS — L97.812 NON-PRESSURE CHRONIC ULCER OF OTHER PART OF RIGHT LOWER LEG WITH FAT LAYER EXPOSED: ICD-10-CM

## 2023-04-04 DIAGNOSIS — I87.2 VENOUS INSUFFICIENCY (CHRONIC) (PERIPHERAL): ICD-10-CM

## 2023-04-05 ENCOUNTER — PRE-ADMISSION TESTING (OUTPATIENT)
Dept: PREADMISSION TESTING | Facility: HOSPITAL | Age: 48
End: 2023-04-05
Payer: MEDICAID

## 2023-04-05 VITALS
OXYGEN SATURATION: 95 % | SYSTOLIC BLOOD PRESSURE: 111 MMHG | HEART RATE: 81 BPM | WEIGHT: 213.41 LBS | BODY MASS INDEX: 32.34 KG/M2 | HEIGHT: 68 IN | DIASTOLIC BLOOD PRESSURE: 61 MMHG | RESPIRATION RATE: 16 BRPM

## 2023-04-05 DIAGNOSIS — L97.929 VARICOSE VEINS OF LOWER EXTREMITIES WITH ULCER AND INFLAMMATION: ICD-10-CM

## 2023-04-05 DIAGNOSIS — I83.219 VARICOSE VEINS OF LOWER EXTREMITIES WITH ULCER AND INFLAMMATION: ICD-10-CM

## 2023-04-05 DIAGNOSIS — R60.0 EDEMA OF BOTH LOWER EXTREMITIES: ICD-10-CM

## 2023-04-05 DIAGNOSIS — L97.919 VARICOSE VEINS OF LOWER EXTREMITIES WITH ULCER AND INFLAMMATION: ICD-10-CM

## 2023-04-05 DIAGNOSIS — I87.331 CHRONIC VENOUS HYPERTENSION WITH ULCER AND INFLAMMATION INVOLVING RIGHT SIDE: ICD-10-CM

## 2023-04-05 DIAGNOSIS — I83.229 VARICOSE VEINS OF LOWER EXTREMITIES WITH ULCER AND INFLAMMATION: ICD-10-CM

## 2023-04-05 DIAGNOSIS — I87.2 VENOUS (PERIPHERAL) INSUFFICIENCY: ICD-10-CM

## 2023-04-05 LAB
ANION GAP SERPL CALCULATED.3IONS-SCNC: 12 MMOL/L (ref 5–15)
BASOPHILS # BLD AUTO: 0.06 10*3/MM3 (ref 0–0.2)
BASOPHILS NFR BLD AUTO: 0.9 % (ref 0–1.5)
BUN SERPL-MCNC: 37 MG/DL (ref 6–20)
BUN/CREAT SERPL: 28.2 (ref 7–25)
CALCIUM SPEC-SCNC: 8.7 MG/DL (ref 8.6–10.5)
CHLORIDE SERPL-SCNC: 102 MMOL/L (ref 98–107)
CO2 SERPL-SCNC: 24 MMOL/L (ref 22–29)
CREAT SERPL-MCNC: 1.31 MG/DL (ref 0.57–1)
DEPRECATED RDW RBC AUTO: 49.2 FL (ref 37–54)
EGFRCR SERPLBLD CKD-EPI 2021: 51.3 ML/MIN/1.73
EOSINOPHIL # BLD AUTO: 0.12 10*3/MM3 (ref 0–0.4)
EOSINOPHIL NFR BLD AUTO: 1.9 % (ref 0.3–6.2)
ERYTHROCYTE [DISTWIDTH] IN BLOOD BY AUTOMATED COUNT: 16.4 % (ref 12.3–15.4)
GLUCOSE SERPL-MCNC: 135 MG/DL (ref 65–99)
HCT VFR BLD AUTO: 32.7 % (ref 34–46.6)
HGB BLD-MCNC: 9.7 G/DL (ref 12–15.9)
IMM GRANULOCYTES # BLD AUTO: 0.03 10*3/MM3 (ref 0–0.05)
IMM GRANULOCYTES NFR BLD AUTO: 0.5 % (ref 0–0.5)
LYMPHOCYTES # BLD AUTO: 1.7 10*3/MM3 (ref 0.7–3.1)
LYMPHOCYTES NFR BLD AUTO: 26.7 % (ref 19.6–45.3)
MCH RBC QN AUTO: 24.4 PG (ref 26.6–33)
MCHC RBC AUTO-ENTMCNC: 29.7 G/DL (ref 31.5–35.7)
MCV RBC AUTO: 82.4 FL (ref 79–97)
MONOCYTES # BLD AUTO: 0.6 10*3/MM3 (ref 0.1–0.9)
MONOCYTES NFR BLD AUTO: 9.4 % (ref 5–12)
NEUTROPHILS NFR BLD AUTO: 3.86 10*3/MM3 (ref 1.7–7)
NEUTROPHILS NFR BLD AUTO: 60.6 % (ref 42.7–76)
NRBC BLD AUTO-RTO: 0 /100 WBC (ref 0–0.2)
PLATELET # BLD AUTO: 297 10*3/MM3 (ref 140–450)
PMV BLD AUTO: 9.5 FL (ref 6–12)
POTASSIUM SERPL-SCNC: 4.5 MMOL/L (ref 3.5–5.2)
RBC # BLD AUTO: 3.97 10*6/MM3 (ref 3.77–5.28)
SODIUM SERPL-SCNC: 138 MMOL/L (ref 136–145)
WBC NRBC COR # BLD: 6.37 10*3/MM3 (ref 3.4–10.8)

## 2023-04-05 PROCEDURE — 36415 COLL VENOUS BLD VENIPUNCTURE: CPT

## 2023-04-05 PROCEDURE — 85025 COMPLETE CBC W/AUTO DIFF WBC: CPT

## 2023-04-05 PROCEDURE — 80048 BASIC METABOLIC PNL TOTAL CA: CPT

## 2023-04-05 PROCEDURE — 93005 ELECTROCARDIOGRAM TRACING: CPT

## 2023-04-05 PROCEDURE — 93010 ELECTROCARDIOGRAM REPORT: CPT | Performed by: INTERNAL MEDICINE

## 2023-04-05 RX ORDER — LAMOTRIGINE 25 MG/1
50 TABLET ORAL NIGHTLY
COMMUNITY

## 2023-04-05 RX ORDER — NYSTATIN 100000 [USP'U]/G
POWDER TOPICAL 2 TIMES DAILY
COMMUNITY

## 2023-04-05 NOTE — DISCHARGE INSTRUCTIONS
Before you come to the hospital        Arrival time: AS DIRECTED BY OFFICE     YOU MAY TAKE THE FOLLOWING MEDICATION(S) THE MORNING OF SURGERY WITH A SIP OF WATER: Propranolol, Pregabalin, metoprolol           ALL OTHER HOME MEDICATION CHECK WITH YOUR PHYSICIAN (especially if   you are taking diabetes medicines or blood thinners)          If you were given and instructed to use a germ- killing soap, use as directed the night before surgery and again the morning of surgery or as directed by your surgeon. (Use one-half of the bottle with each shower.)   See attached information for How to Use Chlorhexidine for Bathing if applicable.            Eating and drinking restrictions prior to scheduled arrival time    2 Hours before arrival time STOP   Drinking Clear liquids (water, apple juice-no pulp)     6 Hours before arrival time STOP   Milk or drinks that contain milk, full liquids    6 Hours before arrival time STOP   Light meals or foods, such as toast or cereal    8 Hours before arrival time STOP   Heavy foods, such as meat, fried foods, or fatty foods    (It is extremely important that you follow these guidelines to prevent delay or cancelation of your procedure)     Clear Liquids  Water and flavored water                                                                      Clear Fruit juices, such as cranberry juice and apple juice.  Black coffee (NO cream of any kind, including powdered).  Plain tea  Clear bouillon or broth.  Flavored gelatin.  Soda.  Gatorade or Powerade.  Full liquid examples  Juices that have pulp.  Frozen ice pops that contain fruit pieces.  Coffee with creamer  Milk.  Yogurt.                MANAGING PAIN AFTER SURGERY    We know you are probably wondering what your pain will be like after surgery.  Following surgery it is unrealistic to expect you will not have pain.   Pain is how our bodies let us know that something is wrong or cautions us to be careful.  That said, our goal is to make your  pain tolerable.    Methods we may use to treat your pain include (oral or IV medications, PCAs, epidurals, nerve blocks, etc.)   While some procedures require IV pain medications for a short time after surgery, transitioning to pain medications by mouth allows for better management of pain.   Your nurse will encourage you to take oral pain medications whenever possible.  IV medications work almost immediately, but only last a short while.  Taking medications by mouth allows for a more constant level of medication in your blood stream for a longer period of time.      Once your pain is out of control it is harder to get back under control.  It is important you are aware when your next dose of pain medication is due.  If you are admitted, your nurse may write the time of your next dose on the white board in your room to help you remember.      We are interested in your pain and encourage you to inform us about aggravating factors during your visit.   Many times a simple repositioning every few hours can make a big difference.    If your physician says it is okay, do not let your pain prevent you from getting out of bed. Be sure to call your nurse for assistance prior to getting up so you do not fall.      Before surgery, please decide your tolerable pain goal.  These faces help describe the pain ratings we use on a 0-10 scale.   Be prepared to tell us your goal and whether or not you take pain or anxiety medications at home.          Preparing for Surgery  Preparing for surgery is an important part of your care. It can make things go more smoothly and help you avoid complications. The steps leading up to surgery may vary among hospitals. Follow all instructions given to you by your health care providers. Ask questions if you do not understand something. Talk about any concerns that you have.  Here are some questions to consider asking before your surgery:  If my surgery is not an emergency (is elective), when would be  the best time to have the surgery?  What arrangements do I need to make for work, home, or school?  What will my recovery be like? How long will it be before I can return to normal activities?  Will I need to prepare my home? Will I need to arrange care for me or my children?  Should I expect to have pain after surgery? What are my pain management options? Are there nonmedical options that I can try for pain?  Tell a health care provider about:  Any allergies you have.  All medicines you are taking, including vitamins, herbs, eye drops, creams, and over-the-counter medicines.  Any problems you or family members have had with anesthetic medicines.  Any blood disorders you have.  Any surgeries you have had.  Any medical conditions you have.  Whether you are pregnant or may be pregnant.  What are the risks?  The risks and complications of surgery depend on the specific procedure that you have. Discuss all the risks with your health care providers before your surgery. Ask about common surgical complications, which may include:  Infection.  Bleeding or a need for blood replacement (transfusion).  Allergic reactions to medicines.  Damage to surrounding nerves, tissues, or structures.  A blood clot.  Scarring.  Failure of the surgery to correct the problem.  Follow these instructions before the procedure:  Several days or weeks before your procedure  You may have a physical exam by your primary health care provider to make sure it is safe for you to have surgery.  You may have testing. This may include a chest X-ray, blood and urine tests, electrocardiogram (ECG), or other testing.  Ask your health care provider about:  Changing or stopping your regular medicines. This is especially important if you are taking diabetes medicines or blood thinners.  Taking medicines such as aspirin and ibuprofen. These medicines can thin your blood. Do not take these medicines unless your health care provider tells you to take them.  Taking  over-the-counter medicines, vitamins, herbs, and supplements.  Do not use any products that contain nicotine or tobacco, such as cigarettes and e-cigarettes. If you need help quitting, ask your health care provider.  Avoid alcohol.  Ask your health care provider if there are exercises you can do to prepare for surgery.  Eat a healthy diet.   Plan to have someone 18 years of age or older to take you home from the hospital. We will need to verify your ride on the morning of surgery if you are being discharged home on the same day. Tell your ride to be expecting a call from the hospital prior to your procedure.   Plan to have a responsible adult care for you for at least 24 hours after you leave the hospital or clinic. This is important.  The day before your procedure  You may be given antibiotic medicine to take by mouth to help prevent infection. Take it as told by your health care provider.  You may be asked to shower with a germ-killing soap.  Follow instructions from your health care provider about eating and drinking restrictions. This includes gum, mints and hard candy.  Pack comfortable clothes according to your procedure.   The day of your procedure  You may need to take another shower with a germ-killing soap before you leave home in the morning.  With a small sip of water, take only the medicines that you are told to take.  Remove all jewelry including rings.   Leave anything you consider valuable at home except hearing aids if needed.  You do not need to bring your home medications into the hospital.   Do not wear any makeup, nail polish, powder, deodorant, lotion, hair accessories, or anything on your skin or body except your clothes.  If you will be staying in the hospital, bring a case to hold your glasses, contacts, or dentures. You may also want to bring your robe and non-skid footwear.       (Do not use denture adhesives since you will be asked to remove them during  surgery).   If you wear oxygen at  home, bring it with you the day of surgery.  If instructed by your health care provider, bring your sleep apnea device with you on the day of your surgery (if this applies to you).  You may want to leave your suitcase and sleep apnea device in the car until after surgery.   Arrive at the hospital as scheduled.  Bring a friend or family member with you who can help to answer questions and be present while you meet with your health care provider.  At the hospital  When you arrive at the hospital:  Go to registration located at the main entrance of the hospital. You will be registered and given a beeper and a sticker sheet. Take the stickers to the Outpatient nurses desk and place in the black tray. This is to notify staff that you have arrived. Then return to the lobby to wait.   When your beeper lights up and vibrates proceed through the double doors, under the stairs, and a member of the Outpatient Surgery staff will escort you to your preoperative room.  You may have to wear compression sleeves. These help to prevent blood clots and reduce swelling in your legs.  An IV may be inserted into one of your veins.              In the operating room, you may be given one or more of the following:        A medicine to help you relax (sedative).        A medicine to numb the area (local anesthetic).        A medicine to make you fall asleep (general anesthetic).        A medicine that is injected into an area of your body to numb everything below the                      injection site (regional anesthetic).  You may be given an antibiotic through your IV to help prevent infection.  Your surgical site will be marked or identified.    Contact a health care provider if you:  Develop a fever of more than 100.4°F (38°C) or other feelings of illness during the 48 hours before your surgery.  Have symptoms that get worse.  Have questions or concerns about your surgery.  Summary  Preparing for surgery can make the procedure go more  smoothly and lower your risk of complications.  Before surgery, make a list of questions and concerns to discuss with your surgeon. Ask about the risks and possible complications.  In the days or weeks before your surgery, follow all instructions from your health care provider. You may need to stop smoking, avoid alcohol, follow eating restrictions, and change or stop your regular medicines.  Contact your surgeon if you develop a fever or other signs of illness during the few days before your surgery.  This information is not intended to replace advice given to you by your health care provider. Make sure you discuss any questions you have with your health care provider.  Document Revised: 12/21/2018 Document Reviewed: 10/23/2018  [employer] Patient Education © 2021 [employer]

## 2023-04-07 LAB
QT INTERVAL: 358 MS
QTC INTERVAL: 412 MS

## 2023-04-10 ENCOUNTER — HOSPITAL ENCOUNTER (INPATIENT)
Age: 48
LOS: 2 days | Discharge: HOME | DRG: 690 | End: 2023-04-12
Attending: PEDIATRICS | Admitting: FAMILY MEDICINE
Payer: MEDICAID

## 2023-04-10 ENCOUNTER — APPOINTMENT (OUTPATIENT)
Dept: GENERAL RADIOLOGY | Age: 48
DRG: 690 | End: 2023-04-10
Payer: MEDICAID

## 2023-04-10 DIAGNOSIS — N17.9 SEPSIS WITH ACUTE RENAL FAILURE WITHOUT SEPTIC SHOCK, DUE TO UNSPECIFIED ORGANISM, UNSPECIFIED ACUTE RENAL FAILURE TYPE (HCC): Primary | ICD-10-CM

## 2023-04-10 DIAGNOSIS — R65.20 SEPSIS WITH ACUTE RENAL FAILURE WITHOUT SEPTIC SHOCK, DUE TO UNSPECIFIED ORGANISM, UNSPECIFIED ACUTE RENAL FAILURE TYPE (HCC): Primary | ICD-10-CM

## 2023-04-10 DIAGNOSIS — R41.0 DELIRIUM: ICD-10-CM

## 2023-04-10 DIAGNOSIS — A41.9 SEPSIS WITH ACUTE RENAL FAILURE WITHOUT SEPTIC SHOCK, DUE TO UNSPECIFIED ORGANISM, UNSPECIFIED ACUTE RENAL FAILURE TYPE (HCC): Primary | ICD-10-CM

## 2023-04-10 LAB
ALBUMIN SERPL-MCNC: 4 G/DL (ref 3.5–5.2)
ALP SERPL-CCNC: 170 U/L (ref 35–104)
ALT SERPL-CCNC: 19 U/L (ref 5–33)
ANION GAP SERPL CALCULATED.3IONS-SCNC: 14 MMOL/L (ref 7–19)
AST SERPL-CCNC: 17 U/L (ref 5–32)
B PARAP IS1001 DNA NPH QL NAA+NON-PROBE: NOT DETECTED
B PERT.PT PRMT NPH QL NAA+NON-PROBE: NOT DETECTED
BACTERIA URNS QL MICRO: ABNORMAL /HPF
BASOPHILS # BLD: 0.1 K/UL (ref 0–0.2)
BASOPHILS NFR BLD: 0.7 % (ref 0–1)
BILIRUB SERPL-MCNC: 0.3 MG/DL (ref 0.2–1.2)
BILIRUB UR QL STRIP: NEGATIVE
BUN SERPL-MCNC: 53 MG/DL (ref 6–20)
C PNEUM DNA NPH QL NAA+NON-PROBE: NOT DETECTED
CALCIUM SERPL-MCNC: 9.3 MG/DL (ref 8.6–10)
CHLORIDE SERPL-SCNC: 100 MMOL/L (ref 98–111)
CLARITY UR: CLEAR
CO2 SERPL-SCNC: 23 MMOL/L (ref 22–29)
COLOR UR: YELLOW
CREAT SERPL-MCNC: 2.2 MG/DL (ref 0.5–0.9)
CRYSTALS URNS MICRO: ABNORMAL /HPF
EOSINOPHIL # BLD: 0.1 K/UL (ref 0–0.6)
EOSINOPHIL NFR BLD: 1.6 % (ref 0–5)
EPI CELLS #/AREA URNS AUTO: 2 /HPF (ref 0–5)
ERYTHROCYTE [DISTWIDTH] IN BLOOD BY AUTOMATED COUNT: 16.5 % (ref 11.5–14.5)
FLUAV RNA NPH QL NAA+NON-PROBE: NOT DETECTED
FLUBV RNA NPH QL NAA+NON-PROBE: NOT DETECTED
GLUCOSE BLD-MCNC: 64 MG/DL (ref 70–99)
GLUCOSE BLD-MCNC: 94 MG/DL (ref 70–99)
GLUCOSE SERPL-MCNC: 83 MG/DL (ref 74–109)
GLUCOSE UR STRIP.AUTO-MCNC: NEGATIVE MG/DL
HADV DNA NPH QL NAA+NON-PROBE: NOT DETECTED
HCOV 229E RNA NPH QL NAA+NON-PROBE: NOT DETECTED
HCOV HKU1 RNA NPH QL NAA+NON-PROBE: NOT DETECTED
HCOV NL63 RNA NPH QL NAA+NON-PROBE: NOT DETECTED
HCOV OC43 RNA NPH QL NAA+NON-PROBE: NOT DETECTED
HCT VFR BLD AUTO: 36.7 % (ref 37–47)
HGB BLD-MCNC: 11.4 G/DL (ref 12–16)
HGB UR STRIP.AUTO-MCNC: ABNORMAL MG/L
HMPV RNA NPH QL NAA+NON-PROBE: NOT DETECTED
HPIV1 RNA NPH QL NAA+NON-PROBE: NOT DETECTED
HPIV2 RNA NPH QL NAA+NON-PROBE: NOT DETECTED
HPIV3 RNA NPH QL NAA+NON-PROBE: NOT DETECTED
HPIV4 RNA NPH QL NAA+NON-PROBE: NOT DETECTED
HYALINE CASTS #/AREA URNS AUTO: 3 /HPF (ref 0–8)
IMM GRANULOCYTES # BLD: 0 K/UL
KETONES UR STRIP.AUTO-MCNC: NEGATIVE MG/DL
LACTATE BLDV-SCNC: 2.4 MMOL/L (ref 0.5–1.9)
LEUKOCYTE ESTERASE UR QL STRIP.AUTO: ABNORMAL
LYMPHOCYTES # BLD: 2.4 K/UL (ref 1.1–4.5)
LYMPHOCYTES NFR BLD: 29.2 % (ref 20–40)
M PNEUMO DNA NPH QL NAA+NON-PROBE: NOT DETECTED
MAGNESIUM SERPL-MCNC: 1.3 MG/DL (ref 1.6–2.6)
MCH RBC QN AUTO: 25.6 PG (ref 27–31)
MCHC RBC AUTO-ENTMCNC: 31.1 G/DL (ref 33–37)
MCV RBC AUTO: 82.5 FL (ref 81–99)
MONOCYTES # BLD: 0.7 K/UL (ref 0–0.9)
MONOCYTES NFR BLD: 8.4 % (ref 0–10)
NEUTROPHILS # BLD: 4.8 K/UL (ref 1.5–7.5)
NEUTS SEG NFR BLD: 59.6 % (ref 50–65)
NITRITE UR QL STRIP.AUTO: POSITIVE
PERFORMED ON: ABNORMAL
PERFORMED ON: NORMAL
PH UR STRIP.AUTO: 5.5 [PH] (ref 5–8)
PLATELET # BLD AUTO: 337 K/UL (ref 130–400)
PMV BLD AUTO: 9.6 FL (ref 9.4–12.3)
POTASSIUM SERPL-SCNC: 4 MMOL/L (ref 3.5–5)
PROT SERPL-MCNC: 7.8 G/DL (ref 6.6–8.7)
PROT UR STRIP.AUTO-MCNC: NEGATIVE MG/DL
RBC # BLD AUTO: 4.45 M/UL (ref 4.2–5.4)
RBC #/AREA URNS AUTO: 3 /HPF (ref 0–4)
RSV RNA NPH QL NAA+NON-PROBE: NOT DETECTED
RV+EV RNA NPH QL NAA+NON-PROBE: NOT DETECTED
SARS-COV-2 RNA NPH QL NAA+NON-PROBE: NOT DETECTED
SODIUM SERPL-SCNC: 137 MMOL/L (ref 136–145)
SP GR UR STRIP.AUTO: 1.01 (ref 1–1.03)
TROPONIN T SERPL-MCNC: <0.01 NG/ML (ref 0–0.03)
UROBILINOGEN UR STRIP.AUTO-MCNC: 0.2 E.U./DL
WBC # BLD AUTO: 8.1 K/UL (ref 4.8–10.8)
WBC #/AREA URNS AUTO: 21 /HPF (ref 0–5)

## 2023-04-10 PROCEDURE — 0202U NFCT DS 22 TRGT SARS-COV-2: CPT

## 2023-04-10 PROCEDURE — 85025 COMPLETE CBC W/AUTO DIFF WBC: CPT

## 2023-04-10 PROCEDURE — 2580000003 HC RX 258: Performed by: PEDIATRICS

## 2023-04-10 PROCEDURE — 1210000000 HC MED SURG R&B

## 2023-04-10 PROCEDURE — 83605 ASSAY OF LACTIC ACID: CPT

## 2023-04-10 PROCEDURE — 93005 ELECTROCARDIOGRAM TRACING: CPT | Performed by: PEDIATRICS

## 2023-04-10 PROCEDURE — 6360000002 HC RX W HCPCS: Performed by: PEDIATRICS

## 2023-04-10 PROCEDURE — 87186 SC STD MICRODIL/AGAR DIL: CPT

## 2023-04-10 PROCEDURE — 80053 COMPREHEN METABOLIC PANEL: CPT

## 2023-04-10 PROCEDURE — 87086 URINE CULTURE/COLONY COUNT: CPT

## 2023-04-10 PROCEDURE — 2580000003 HC RX 258: Performed by: FAMILY MEDICINE

## 2023-04-10 PROCEDURE — 6370000000 HC RX 637 (ALT 250 FOR IP): Performed by: FAMILY MEDICINE

## 2023-04-10 PROCEDURE — 87040 BLOOD CULTURE FOR BACTERIA: CPT

## 2023-04-10 PROCEDURE — 71045 X-RAY EXAM CHEST 1 VIEW: CPT

## 2023-04-10 PROCEDURE — 81001 URINALYSIS AUTO W/SCOPE: CPT

## 2023-04-10 PROCEDURE — 83735 ASSAY OF MAGNESIUM: CPT

## 2023-04-10 PROCEDURE — 82962 GLUCOSE BLOOD TEST: CPT

## 2023-04-10 PROCEDURE — 6360000002 HC RX W HCPCS: Performed by: FAMILY MEDICINE

## 2023-04-10 PROCEDURE — 84484 ASSAY OF TROPONIN QUANT: CPT

## 2023-04-10 PROCEDURE — 99285 EMERGENCY DEPT VISIT HI MDM: CPT

## 2023-04-10 PROCEDURE — 36415 COLL VENOUS BLD VENIPUNCTURE: CPT

## 2023-04-10 RX ORDER — ENOXAPARIN SODIUM 100 MG/ML
40 INJECTION SUBCUTANEOUS DAILY
Status: DISCONTINUED | OUTPATIENT
Start: 2023-04-10 | End: 2023-04-12 | Stop reason: HOSPADM

## 2023-04-10 RX ORDER — SODIUM CHLORIDE 9 MG/ML
INJECTION, SOLUTION INTRAVENOUS CONTINUOUS
Status: DISCONTINUED | OUTPATIENT
Start: 2023-04-10 | End: 2023-04-12 | Stop reason: HOSPADM

## 2023-04-10 RX ORDER — BUDESONIDE AND FORMOTEROL FUMARATE DIHYDRATE 80; 4.5 UG/1; UG/1
2 AEROSOL RESPIRATORY (INHALATION) 2 TIMES DAILY
Status: DISCONTINUED | OUTPATIENT
Start: 2023-04-10 | End: 2023-04-12 | Stop reason: HOSPADM

## 2023-04-10 RX ORDER — DOCUSATE SODIUM 100 MG/1
100 CAPSULE, LIQUID FILLED ORAL 2 TIMES DAILY
Status: DISCONTINUED | OUTPATIENT
Start: 2023-04-10 | End: 2023-04-12 | Stop reason: HOSPADM

## 2023-04-10 RX ORDER — ALBUTEROL SULFATE 90 UG/1
2 AEROSOL, METERED RESPIRATORY (INHALATION) EVERY 4 HOURS PRN
Status: DISCONTINUED | OUTPATIENT
Start: 2023-04-10 | End: 2023-04-12 | Stop reason: HOSPADM

## 2023-04-10 RX ORDER — SODIUM CHLORIDE 0.9 % (FLUSH) 0.9 %
5-40 SYRINGE (ML) INJECTION EVERY 12 HOURS SCHEDULED
Status: DISCONTINUED | OUTPATIENT
Start: 2023-04-10 | End: 2023-04-12 | Stop reason: HOSPADM

## 2023-04-10 RX ORDER — ESOMEPRAZOLE MAGNESIUM 40 MG/1
20 FOR SUSPENSION ORAL DAILY
Status: DISCONTINUED | OUTPATIENT
Start: 2023-04-11 | End: 2023-04-10 | Stop reason: CLARIF

## 2023-04-10 RX ORDER — FUROSEMIDE 40 MG/1
40 TABLET ORAL DAILY
Status: DISCONTINUED | OUTPATIENT
Start: 2023-04-11 | End: 2023-04-10

## 2023-04-10 RX ORDER — SODIUM CHLORIDE 9 MG/ML
INJECTION, SOLUTION INTRAVENOUS PRN
Status: DISCONTINUED | OUTPATIENT
Start: 2023-04-10 | End: 2023-04-12 | Stop reason: HOSPADM

## 2023-04-10 RX ORDER — LAMOTRIGINE 25 MG/1
25 TABLET ORAL DAILY
Status: DISCONTINUED | OUTPATIENT
Start: 2023-04-11 | End: 2023-04-12 | Stop reason: HOSPADM

## 2023-04-10 RX ORDER — TIZANIDINE 4 MG/1
4 TABLET ORAL EVERY 8 HOURS PRN
Status: DISCONTINUED | OUTPATIENT
Start: 2023-04-10 | End: 2023-04-12 | Stop reason: HOSPADM

## 2023-04-10 RX ORDER — LANOLIN ALCOHOL/MO/W.PET/CERES
3 CREAM (GRAM) TOPICAL NIGHTLY
Status: DISCONTINUED | OUTPATIENT
Start: 2023-04-10 | End: 2023-04-12 | Stop reason: HOSPADM

## 2023-04-10 RX ORDER — PREGABALIN 50 MG/1
100 CAPSULE ORAL 3 TIMES DAILY
Status: DISCONTINUED | OUTPATIENT
Start: 2023-04-10 | End: 2023-04-12 | Stop reason: HOSPADM

## 2023-04-10 RX ORDER — AMITRIPTYLINE HYDROCHLORIDE 50 MG/1
100 TABLET, FILM COATED ORAL NIGHTLY
Status: DISCONTINUED | OUTPATIENT
Start: 2023-04-10 | End: 2023-04-12 | Stop reason: HOSPADM

## 2023-04-10 RX ORDER — OXYCODONE HYDROCHLORIDE AND ACETAMINOPHEN 5; 325 MG/1; MG/1
1 TABLET ORAL EVERY 8 HOURS PRN
Status: DISCONTINUED | OUTPATIENT
Start: 2023-04-10 | End: 2023-04-12 | Stop reason: HOSPADM

## 2023-04-10 RX ORDER — SODIUM CHLORIDE 0.9 % (FLUSH) 0.9 %
5-40 SYRINGE (ML) INJECTION PRN
Status: DISCONTINUED | OUTPATIENT
Start: 2023-04-10 | End: 2023-04-12 | Stop reason: HOSPADM

## 2023-04-10 RX ORDER — PAROXETINE HYDROCHLORIDE 20 MG/1
40 TABLET, FILM COATED ORAL EVERY MORNING
Status: DISCONTINUED | OUTPATIENT
Start: 2023-04-11 | End: 2023-04-12 | Stop reason: HOSPADM

## 2023-04-10 RX ORDER — ONDANSETRON 4 MG/1
4 TABLET, ORALLY DISINTEGRATING ORAL EVERY 8 HOURS PRN
Status: DISCONTINUED | OUTPATIENT
Start: 2023-04-10 | End: 2023-04-12 | Stop reason: HOSPADM

## 2023-04-10 RX ORDER — FLUTICASONE PROPIONATE 50 MCG
2 SPRAY, SUSPENSION (ML) NASAL DAILY
Status: DISCONTINUED | OUTPATIENT
Start: 2023-04-11 | End: 2023-04-12 | Stop reason: HOSPADM

## 2023-04-10 RX ORDER — INSULIN LISPRO 100 [IU]/ML
0-4 INJECTION, SOLUTION INTRAVENOUS; SUBCUTANEOUS NIGHTLY
Status: DISCONTINUED | OUTPATIENT
Start: 2023-04-10 | End: 2023-04-12 | Stop reason: HOSPADM

## 2023-04-10 RX ORDER — CETIRIZINE HYDROCHLORIDE 5 MG/1
5 TABLET ORAL DAILY
Status: DISCONTINUED | OUTPATIENT
Start: 2023-04-11 | End: 2023-04-12 | Stop reason: HOSPADM

## 2023-04-10 RX ORDER — DEXTROSE MONOHYDRATE 100 MG/ML
INJECTION, SOLUTION INTRAVENOUS CONTINUOUS PRN
Status: DISCONTINUED | OUTPATIENT
Start: 2023-04-10 | End: 2023-04-12 | Stop reason: HOSPADM

## 2023-04-10 RX ORDER — INSULIN LISPRO 100 [IU]/ML
0-4 INJECTION, SOLUTION INTRAVENOUS; SUBCUTANEOUS
Status: DISCONTINUED | OUTPATIENT
Start: 2023-04-10 | End: 2023-04-12 | Stop reason: HOSPADM

## 2023-04-10 RX ORDER — PANTOPRAZOLE SODIUM 40 MG/1
40 TABLET, DELAYED RELEASE ORAL DAILY
Status: DISCONTINUED | OUTPATIENT
Start: 2023-04-11 | End: 2023-04-12 | Stop reason: HOSPADM

## 2023-04-10 RX ORDER — CALCIUM CARBONATE 200(500)MG
1 TABLET,CHEWABLE ORAL DAILY
Status: DISCONTINUED | OUTPATIENT
Start: 2023-04-11 | End: 2023-04-12 | Stop reason: HOSPADM

## 2023-04-10 RX ORDER — ACETAMINOPHEN 325 MG/1
650 TABLET ORAL EVERY 4 HOURS PRN
Status: DISCONTINUED | OUTPATIENT
Start: 2023-04-10 | End: 2023-04-12 | Stop reason: HOSPADM

## 2023-04-10 RX ORDER — ONDANSETRON 2 MG/ML
4 INJECTION INTRAMUSCULAR; INTRAVENOUS EVERY 6 HOURS PRN
Status: DISCONTINUED | OUTPATIENT
Start: 2023-04-10 | End: 2023-04-12 | Stop reason: HOSPADM

## 2023-04-10 RX ORDER — TIZANIDINE 4 MG/1
4 TABLET ORAL 3 TIMES DAILY
Status: DISCONTINUED | OUTPATIENT
Start: 2023-04-10 | End: 2023-04-10

## 2023-04-10 RX ORDER — PROPRANOLOL HYDROCHLORIDE 20 MG/1
20 TABLET ORAL 3 TIMES DAILY
Status: DISCONTINUED | OUTPATIENT
Start: 2023-04-10 | End: 2023-04-12 | Stop reason: HOSPADM

## 2023-04-10 RX ADMIN — SODIUM CHLORIDE: 9 INJECTION, SOLUTION INTRAVENOUS at 22:55

## 2023-04-10 RX ADMIN — TIZANIDINE 4 MG: 4 TABLET ORAL at 22:56

## 2023-04-10 RX ADMIN — SODIUM CHLORIDE 1986 ML: 9 INJECTION, SOLUTION INTRAVENOUS at 16:49

## 2023-04-10 RX ADMIN — ACETAMINOPHEN 650 MG: 325 TABLET ORAL at 22:56

## 2023-04-10 RX ADMIN — AMITRIPTYLINE HYDROCHLORIDE 100 MG: 50 TABLET, FILM COATED ORAL at 22:56

## 2023-04-10 RX ADMIN — CEFTRIAXONE 1000 MG: 1 INJECTION, POWDER, FOR SOLUTION INTRAMUSCULAR; INTRAVENOUS at 16:49

## 2023-04-10 RX ADMIN — PROPRANOLOL HYDROCHLORIDE 20 MG: 20 TABLET ORAL at 22:56

## 2023-04-10 RX ADMIN — DOCUSATE SODIUM 100 MG: 100 CAPSULE, LIQUID FILLED ORAL at 22:56

## 2023-04-10 RX ADMIN — Medication 3 MG: at 22:56

## 2023-04-10 RX ADMIN — OXYCODONE HYDROCHLORIDE AND ACETAMINOPHEN 1 TABLET: 5; 325 TABLET ORAL at 22:56

## 2023-04-10 RX ADMIN — ENOXAPARIN SODIUM 40 MG: 100 INJECTION SUBCUTANEOUS at 22:56

## 2023-04-10 RX ADMIN — PREGABALIN 100 MG: 50 CAPSULE ORAL at 22:56

## 2023-04-10 ASSESSMENT — PAIN - FUNCTIONAL ASSESSMENT: PAIN_FUNCTIONAL_ASSESSMENT: NONE - DENIES PAIN

## 2023-04-10 NOTE — ED NOTES
Dr. Nash Vera notified of patients condition, verbal order from Dr. Nash Vera to give something to eat and drink.      Shaan Lemon RN  04/10/23 [id number]

## 2023-04-11 ENCOUNTER — APPOINTMENT (OUTPATIENT)
Dept: ULTRASOUND IMAGING | Age: 48
DRG: 690 | End: 2023-04-11
Payer: MEDICAID

## 2023-04-11 LAB
ANION GAP SERPL CALCULATED.3IONS-SCNC: 12 MMOL/L (ref 7–19)
BASOPHILS # BLD: 0.1 K/UL (ref 0–0.2)
BASOPHILS NFR BLD: 0.7 % (ref 0–1)
BUN SERPL-MCNC: 41 MG/DL (ref 6–20)
CALCIUM SERPL-MCNC: 8.6 MG/DL (ref 8.6–10)
CHLORIDE SERPL-SCNC: 104 MMOL/L (ref 98–111)
CO2 SERPL-SCNC: 22 MMOL/L (ref 22–29)
CREAT SERPL-MCNC: 1.6 MG/DL (ref 0.5–0.9)
EKG P AXIS: 48 DEGREES
EKG P-R INTERVAL: 180 MS
EKG Q-T INTERVAL: 340 MS
EKG QRS DURATION: 92 MS
EKG QTC CALCULATION (BAZETT): 399 MS
EKG T AXIS: 43 DEGREES
EOSINOPHIL # BLD: 0.1 K/UL (ref 0–0.6)
EOSINOPHIL NFR BLD: 1.7 % (ref 0–5)
ERYTHROCYTE [DISTWIDTH] IN BLOOD BY AUTOMATED COUNT: 16.4 % (ref 11.5–14.5)
GLUCOSE BLD-MCNC: 111 MG/DL (ref 70–99)
GLUCOSE BLD-MCNC: 159 MG/DL (ref 70–99)
GLUCOSE BLD-MCNC: 176 MG/DL (ref 70–99)
GLUCOSE SERPL-MCNC: 165 MG/DL (ref 74–109)
HCT VFR BLD AUTO: 32.4 % (ref 37–47)
HGB BLD-MCNC: 10.2 G/DL (ref 12–16)
IMM GRANULOCYTES # BLD: 0 K/UL
LYMPHOCYTES # BLD: 2.1 K/UL (ref 1.1–4.5)
LYMPHOCYTES NFR BLD: 30.5 % (ref 20–40)
MAGNESIUM SERPL-MCNC: 1.2 MG/DL (ref 1.6–2.6)
MCH RBC QN AUTO: 25.8 PG (ref 27–31)
MCHC RBC AUTO-ENTMCNC: 31.5 G/DL (ref 33–37)
MCV RBC AUTO: 82 FL (ref 81–99)
MONOCYTES # BLD: 0.4 K/UL (ref 0–0.9)
MONOCYTES NFR BLD: 6.4 % (ref 0–10)
NEUTROPHILS # BLD: 4.1 K/UL (ref 1.5–7.5)
NEUTS SEG NFR BLD: 60.4 % (ref 50–65)
PERFORMED ON: ABNORMAL
PLATELET # BLD AUTO: 264 K/UL (ref 130–400)
PMV BLD AUTO: 9.6 FL (ref 9.4–12.3)
POTASSIUM SERPL-SCNC: 3.5 MMOL/L (ref 3.5–5)
RBC # BLD AUTO: 3.95 M/UL (ref 4.2–5.4)
SODIUM SERPL-SCNC: 138 MMOL/L (ref 136–145)
WBC # BLD AUTO: 6.9 K/UL (ref 4.8–10.8)

## 2023-04-11 PROCEDURE — 99222 1ST HOSP IP/OBS MODERATE 55: CPT | Performed by: NEUROLOGICAL SURGERY

## 2023-04-11 PROCEDURE — 36415 COLL VENOUS BLD VENIPUNCTURE: CPT

## 2023-04-11 PROCEDURE — 80048 BASIC METABOLIC PNL TOTAL CA: CPT

## 2023-04-11 PROCEDURE — 1210000000 HC MED SURG R&B

## 2023-04-11 PROCEDURE — 6360000002 HC RX W HCPCS: Performed by: FAMILY MEDICINE

## 2023-04-11 PROCEDURE — 2580000003 HC RX 258: Performed by: FAMILY MEDICINE

## 2023-04-11 PROCEDURE — 83735 ASSAY OF MAGNESIUM: CPT

## 2023-04-11 PROCEDURE — 6370000000 HC RX 637 (ALT 250 FOR IP): Performed by: FAMILY MEDICINE

## 2023-04-11 PROCEDURE — 85025 COMPLETE CBC W/AUTO DIFF WBC: CPT

## 2023-04-11 PROCEDURE — 94640 AIRWAY INHALATION TREATMENT: CPT

## 2023-04-11 PROCEDURE — 94760 N-INVAS EAR/PLS OXIMETRY 1: CPT

## 2023-04-11 PROCEDURE — 76770 US EXAM ABDO BACK WALL COMP: CPT

## 2023-04-11 PROCEDURE — 93010 ELECTROCARDIOGRAM REPORT: CPT | Performed by: INTERNAL MEDICINE

## 2023-04-11 PROCEDURE — 82962 GLUCOSE BLOOD TEST: CPT

## 2023-04-11 RX ORDER — LANOLIN ALCOHOL/MO/W.PET/CERES
400 CREAM (GRAM) TOPICAL 2 TIMES DAILY
Status: DISCONTINUED | OUTPATIENT
Start: 2023-04-11 | End: 2023-04-12 | Stop reason: HOSPADM

## 2023-04-11 RX ADMIN — DOCUSATE SODIUM 100 MG: 100 CAPSULE, LIQUID FILLED ORAL at 08:19

## 2023-04-11 RX ADMIN — CETIRIZINE HYDROCHLORIDE 5 MG: 5 TABLET ORAL at 08:18

## 2023-04-11 RX ADMIN — PREGABALIN 100 MG: 50 CAPSULE ORAL at 08:18

## 2023-04-11 RX ADMIN — PROPRANOLOL HYDROCHLORIDE 20 MG: 20 TABLET ORAL at 08:19

## 2023-04-11 RX ADMIN — OXYCODONE HYDROCHLORIDE AND ACETAMINOPHEN 1 TABLET: 5; 325 TABLET ORAL at 19:58

## 2023-04-11 RX ADMIN — FLUTICASONE PROPIONATE 2 SPRAY: 50 SPRAY, METERED NASAL at 08:18

## 2023-04-11 RX ADMIN — LAMOTRIGINE 25 MG: 25 TABLET ORAL at 08:18

## 2023-04-11 RX ADMIN — Medication 400 MG: at 19:59

## 2023-04-11 RX ADMIN — SODIUM CHLORIDE, PRESERVATIVE FREE 10 ML: 5 INJECTION INTRAVENOUS at 09:48

## 2023-04-11 RX ADMIN — SODIUM CHLORIDE, PRESERVATIVE FREE 10 ML: 5 INJECTION INTRAVENOUS at 19:58

## 2023-04-11 RX ADMIN — CEFTRIAXONE 1000 MG: 1 INJECTION, POWDER, FOR SOLUTION INTRAMUSCULAR; INTRAVENOUS at 19:58

## 2023-04-11 RX ADMIN — BUDESONIDE AND FORMOTEROL FUMARATE DIHYDRATE 2 PUFF: 80; 4.5 AEROSOL RESPIRATORY (INHALATION) at 06:56

## 2023-04-11 RX ADMIN — PREGABALIN 100 MG: 50 CAPSULE ORAL at 19:57

## 2023-04-11 RX ADMIN — PROPRANOLOL HYDROCHLORIDE 20 MG: 20 TABLET ORAL at 14:25

## 2023-04-11 RX ADMIN — AMITRIPTYLINE HYDROCHLORIDE 100 MG: 50 TABLET, FILM COATED ORAL at 19:58

## 2023-04-11 RX ADMIN — TIZANIDINE 4 MG: 4 TABLET ORAL at 19:58

## 2023-04-11 RX ADMIN — PREGABALIN 100 MG: 50 CAPSULE ORAL at 14:25

## 2023-04-11 RX ADMIN — DOCUSATE SODIUM 100 MG: 100 CAPSULE, LIQUID FILLED ORAL at 19:58

## 2023-04-11 RX ADMIN — PAROXETINE HYDROCHLORIDE 40 MG: 20 TABLET, FILM COATED ORAL at 08:18

## 2023-04-11 RX ADMIN — PANTOPRAZOLE SODIUM 40 MG: 40 TABLET, DELAYED RELEASE ORAL at 08:19

## 2023-04-11 RX ADMIN — ANTACID TABLETS 500 MG: 500 TABLET, CHEWABLE ORAL at 08:18

## 2023-04-11 RX ADMIN — PROPRANOLOL HYDROCHLORIDE 20 MG: 20 TABLET ORAL at 19:58

## 2023-04-11 RX ADMIN — Medication 400 MG: at 10:04

## 2023-04-11 RX ADMIN — Medication 3 MG: at 19:58

## 2023-04-11 ASSESSMENT — ENCOUNTER SYMPTOMS
VOMITING: 0
EYES NEGATIVE: 1
ABDOMINAL PAIN: 0
ALLERGIC/IMMUNOLOGIC NEGATIVE: 1
EYE DISCHARGE: 0
BACK PAIN: 0
NAUSEA: 0
SHORTNESS OF BREATH: 0
RESPIRATORY NEGATIVE: 1
COUGH: 0
GASTROINTESTINAL NEGATIVE: 1
RHINORRHEA: 0
COLOR CHANGE: 0

## 2023-04-11 NOTE — H&P
History and Physical      CHIEF COMPLAINT:  AMS    Reason for Admission:  UTI, AMS    History Obtained From:  patient, chart    HISTORY OF PRESENT ILLNESS:      The patient is a 41 y.o. female who was admitted from ER with AMS. She does have a UTI, CIELO/dehydration. She has no c/o CP or SOA. She has no abdominal pain or N/V. She has had no fevers. Per records, she had MRI Monday and has f/u with Dr. Federico Rodriguez this week to review. Past Medical History:        Diagnosis Date    Anxiety     Asthma     Chronic back pain     Chronic hip pain     Chronic pain syndrome     COPD (chronic obstructive pulmonary disease) ([facility])     Depression     Diabetes mellitus (HCC)     Edema     Encephalomalacia     Hypertension     Right foot drop     RLS (restless legs syndrome)     Substance abuse ([facility])     TBI (traumatic brain injury) ([facility] [address]      Past Surgical History:        Procedure Laterality Date    BACK SURGERY      LEG SURGERY Left     TUBAL LIGATION           Medications Prior to Admission:    Medications Prior to Admission: lamoTRIgine (LAMICTAL) 25 MG tablet, Take 25 mg by mouth daily  latanoprost (XALATAN) 0.005 % ophthalmic solution, 1 drop nightly  LORazepam (ATIVAN) 1 MG tablet, Take 1 mg by mouth every 6 hours as needed (radiation therapy). Semaglutide (OZEMPIC, 1 MG/DOSE, SC), Inject into the skin  diazePAM (VALIUM) 5 MG tablet, Take 1 tablet by mouth ONCE PRN for Anxiety or Sleep (take 1 tablet 30 minutes prior to MRI, take second tablet at beginning of study) for up to 2 doses.  Max Daily Amount: 5 mg (Patient not taking: Reported on 3/29/2023)  NONFORMULARY, CBD OIL  calcium carbonate (TUMS) 500 MG chewable tablet, Take 1 tablet by mouth daily  docusate sodium (COLACE) 100 mg capsule, Take 100 mg by mouth 2 times daily  Magnesium Hydroxide (MILK OF MAGNESIA PO), Take by mouth  guaiFENesin 400 MG tablet, Take 400 mg by mouth 4 times daily as needed for Cough  oxyCODONE-acetaminophen (PERCOCET) 5-325 MG per tablet,

## 2023-04-11 NOTE — ED PROVIDER NOTES
[facility]  eMERGENCY dEPARTMENT eNCOUnter      Pt Name: Patrick Connell  MRN: [id number]  [place] 1975  Date of evaluation: 4/10/2023  Provider: Mu Salazar MD    [address] [place] [address]       Chief Complaint   Patient presents with    Aphasia    Extremity Weakness     Slurred speech and difficulty walking x4 days, known brain tumor, had MRI today         HISTORY OF PRESENT ILLNESS   (Location/Symptom, Timing/Onset,Context/Setting, Quality, Duration, Modifying Factors, Severity)  Note limiting factors. Patrick Connell is a 41 y.o. female who presents to the emergency department with slurred speech and weakness. Symptoms began 4 days ago. Patient has a history of brain tumor and had an MRI today. MRI was routine check of growth of tumor. Patient is normally ambulatory but given using a wheelchair at the nursing home. Patient has had decreased cognition, decreased ability to ambulate, increasing memory deficits, and dizziness her [facility] representative where she stays for rehab. Patient sees Dr. Sherry Mir for brain tumor and is scheduled to see the doctor on Wednesday (in 2 days). HPI    NursingNotes were reviewed. REVIEW OF SYSTEMS    (2-9 systems for level 4, 10 or more for level 5)     Review of Systems   Constitutional:  Negative for chills and fever. HENT:  Negative for congestion and rhinorrhea. Eyes:  Negative for discharge. Respiratory:  Negative for cough and shortness of breath. Cardiovascular:  Negative for chest pain and palpitations. Gastrointestinal:  Negative for abdominal pain, nausea and vomiting. Genitourinary:  Negative for difficulty urinating and dysuria. Musculoskeletal:  Negative for back pain and neck pain. Skin:  Negative for color change and pallor. Neurological:  Positive for speech difficulty and weakness. Negative for syncope and light-headedness.    Psychiatric/Behavioral:  Negative for agitation, decreased concentration and suicidal

## 2023-04-12 VITALS
RESPIRATION RATE: 18 BRPM | HEART RATE: 97 BPM | OXYGEN SATURATION: 94 % | BODY MASS INDEX: 31.1 KG/M2 | HEIGHT: 69 IN | DIASTOLIC BLOOD PRESSURE: 80 MMHG | WEIGHT: 210 LBS | SYSTOLIC BLOOD PRESSURE: 123 MMHG | TEMPERATURE: 97.5 F

## 2023-04-12 LAB
ANION GAP SERPL CALCULATED.3IONS-SCNC: 8 MMOL/L (ref 7–19)
BACTERIA UR CULT: ABNORMAL
BACTERIA UR CULT: ABNORMAL
BUN SERPL-MCNC: 16 MG/DL (ref 6–20)
CALCIUM SERPL-MCNC: 9 MG/DL (ref 8.6–10)
CHLORIDE SERPL-SCNC: 107 MMOL/L (ref 98–111)
CO2 SERPL-SCNC: 25 MMOL/L (ref 22–29)
CREAT SERPL-MCNC: 0.9 MG/DL (ref 0.5–0.9)
GLUCOSE BLD-MCNC: 115 MG/DL (ref 70–99)
GLUCOSE BLD-MCNC: 162 MG/DL (ref 70–99)
GLUCOSE BLD-MCNC: 210 MG/DL (ref 70–99)
GLUCOSE SERPL-MCNC: 202 MG/DL (ref 74–109)
ORGANISM: ABNORMAL
PERFORMED ON: ABNORMAL
POTASSIUM SERPL-SCNC: 4.7 MMOL/L (ref 3.5–5)
SODIUM SERPL-SCNC: 140 MMOL/L (ref 136–145)

## 2023-04-12 PROCEDURE — 94760 N-INVAS EAR/PLS OXIMETRY 1: CPT

## 2023-04-12 PROCEDURE — 36415 COLL VENOUS BLD VENIPUNCTURE: CPT

## 2023-04-12 PROCEDURE — 80048 BASIC METABOLIC PNL TOTAL CA: CPT

## 2023-04-12 PROCEDURE — 82962 GLUCOSE BLOOD TEST: CPT

## 2023-04-12 PROCEDURE — 94640 AIRWAY INHALATION TREATMENT: CPT

## 2023-04-12 PROCEDURE — 6370000000 HC RX 637 (ALT 250 FOR IP): Performed by: FAMILY MEDICINE

## 2023-04-12 PROCEDURE — 2580000003 HC RX 258: Performed by: FAMILY MEDICINE

## 2023-04-12 PROCEDURE — 6360000002 HC RX W HCPCS: Performed by: FAMILY MEDICINE

## 2023-04-12 RX ORDER — CEFDINIR 300 MG/1
300 CAPSULE ORAL 2 TIMES DAILY
Qty: 10 CAPSULE | Refills: 0 | Status: SHIPPED | OUTPATIENT
Start: 2023-04-12 | End: 2023-04-17

## 2023-04-12 RX ORDER — TIZANIDINE 4 MG/1
4 TABLET ORAL EVERY 8 HOURS PRN
Qty: 90 TABLET | Refills: 0 | Status: SHIPPED | OUTPATIENT
Start: 2023-04-12

## 2023-04-12 RX ORDER — LANOLIN ALCOHOL/MO/W.PET/CERES
400 CREAM (GRAM) TOPICAL 2 TIMES DAILY
Qty: 60 TABLET | Refills: 1 | Status: SHIPPED | OUTPATIENT
Start: 2023-04-12

## 2023-04-12 RX ADMIN — ENOXAPARIN SODIUM 40 MG: 100 INJECTION SUBCUTANEOUS at 10:21

## 2023-04-12 RX ADMIN — INSULIN LISPRO 1 UNITS: 100 INJECTION, SOLUTION INTRAVENOUS; SUBCUTANEOUS at 12:43

## 2023-04-12 RX ADMIN — PREGABALIN 100 MG: 50 CAPSULE ORAL at 10:21

## 2023-04-12 RX ADMIN — LAMOTRIGINE 25 MG: 25 TABLET ORAL at 10:21

## 2023-04-12 RX ADMIN — DOCUSATE SODIUM 100 MG: 100 CAPSULE, LIQUID FILLED ORAL at 10:21

## 2023-04-12 RX ADMIN — FLUTICASONE PROPIONATE 2 SPRAY: 50 SPRAY, METERED NASAL at 10:30

## 2023-04-12 RX ADMIN — PREGABALIN 100 MG: 50 CAPSULE ORAL at 15:28

## 2023-04-12 RX ADMIN — Medication 400 MG: at 10:22

## 2023-04-12 RX ADMIN — BUDESONIDE AND FORMOTEROL FUMARATE DIHYDRATE 2 PUFF: 80; 4.5 AEROSOL RESPIRATORY (INHALATION) at 07:42

## 2023-04-12 RX ADMIN — PAROXETINE HYDROCHLORIDE 40 MG: 20 TABLET, FILM COATED ORAL at 10:22

## 2023-04-12 RX ADMIN — INSULIN LISPRO 1 UNITS: 100 INJECTION, SOLUTION INTRAVENOUS; SUBCUTANEOUS at 10:22

## 2023-04-12 RX ADMIN — CEFTRIAXONE 1000 MG: 1 INJECTION, POWDER, FOR SOLUTION INTRAMUSCULAR; INTRAVENOUS at 19:15

## 2023-04-12 RX ADMIN — PANTOPRAZOLE SODIUM 40 MG: 40 TABLET, DELAYED RELEASE ORAL at 10:22

## 2023-04-12 RX ADMIN — SODIUM CHLORIDE: 9 INJECTION, SOLUTION INTRAVENOUS at 10:56

## 2023-04-12 RX ADMIN — ANTACID TABLETS 500 MG: 500 TABLET, CHEWABLE ORAL at 10:21

## 2023-04-12 RX ADMIN — CETIRIZINE HYDROCHLORIDE 5 MG: 5 TABLET ORAL at 10:21

## 2023-04-12 RX ADMIN — PROPRANOLOL HYDROCHLORIDE 20 MG: 20 TABLET ORAL at 10:22

## 2023-04-12 RX ADMIN — PROPRANOLOL HYDROCHLORIDE 20 MG: 20 TABLET ORAL at 15:28

## 2023-04-12 RX ADMIN — SODIUM CHLORIDE, PRESERVATIVE FREE 10 ML: 5 INJECTION INTRAVENOUS at 10:22

## 2023-04-12 NOTE — PROGRESS NOTES
Spoke with Dr Suze Post nurse at the office. Informed her of the results of labs Dr Narda Pfeiffer had ordered. She will confer these to Dr Narda Pfeiffer.  Electronically signed by Doris Zabala RN on 4/12/2023 at 4:56 PM'

## 2023-04-12 NOTE — DISCHARGE SUMMARY
Hospital Discharge Summary    Amanuel Chapman  :  1975  MRN:  [id number]    Admit date:  4/10/2023  Discharge date:      Admitting Physician:    Troy Jarrett MD    Discharge Diagnoses:    Principal Problem:    Sepsis [facility])  Resolved Problems:    * No resolved hospital problems. *    UTI    CIELO    Hospital Course:   She was admitted with AMS and UTI and CIELO. She was hydrated and given antibiotics. Dr. Con Negrete has seen pt and reviewed her MRI. He says it is stable. Her labs are stable. Her VS are stable.      Discharge Medications:         Medication List        START taking these medications      cefdinir 300 MG capsule  Commonly known as: OMNICEF  Take 1 capsule by mouth 2 times daily for 5 days     magnesium oxide 400 (240 Mg) MG tablet  Commonly known as: MAG-OX  Take 1 tablet by mouth 2 times daily            CHANGE how you take these medications      tiZANidine 4 MG tablet  Commonly known as: ZANAFLEX  Take 1 tablet by mouth every 8 hours as needed (prn pain, spasm)  What changed:   when to take this  reasons to take this            CONTINUE taking these medications      albuterol sulfate  (90 Base) MCG/ACT inhaler  Commonly known as: PROVENTIL;VENTOLIN;PROAIR     amitriptyline 50 MG tablet  Commonly known as: ELAVIL     budesonide-formoterol 80-4.5 MCG/ACT Aero  Commonly known as: SYMBICORT     calcium carbonate 500 MG chewable tablet  Commonly known as: TUMS     docusate sodium 100 MG capsule  Commonly known as: COLACE     esomeprazole Magnesium 40 MG Pack  Commonly known as: NEXIUM     fluticasone 50 MCG/ACT nasal spray  Commonly known as: FLONASE     lamoTRIgine 25 MG tablet  Commonly known as: LAMICTAL     latanoprost 0.005 % ophthalmic solution  Commonly known as: XALATAN     lidocaine 5 %  Commonly known as: LIDODERM     loratadine 10 MG tablet  Commonly known as: CLARITIN     medroxyPROGESTERone 150 MG/ML injection  Commonly known as: DEPO-PROVERA     melatonin 3 MG Tabs tablet     metFORMIN

## 2023-04-13 ENCOUNTER — APPOINTMENT (OUTPATIENT)
Dept: CT IMAGING | Age: 48
End: 2023-04-13
Payer: MEDICAID

## 2023-04-13 ENCOUNTER — HOSPITAL ENCOUNTER (EMERGENCY)
Age: 48
Discharge: OTHER INSTITUTION | End: 2023-04-13
Attending: EMERGENCY MEDICINE
Payer: MEDICAID

## 2023-04-13 VITALS
DIASTOLIC BLOOD PRESSURE: 81 MMHG | TEMPERATURE: 97.7 F | SYSTOLIC BLOOD PRESSURE: 122 MMHG | RESPIRATION RATE: 15 BRPM | HEART RATE: 89 BPM

## 2023-04-13 DIAGNOSIS — S00.03XA HEMATOMA OF OCCIPITAL REGION OF SCALP: ICD-10-CM
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
## 2023-04-13 PROCEDURE — 99284 EMERGENCY DEPT VISIT MOD MDM: CPT | Performed by: EMERGENCY MEDICINE

## 2023-04-13 PROCEDURE — 70450 CT HEAD/BRAIN W/O DYE: CPT

## 2023-04-13 PROCEDURE — 72125 CT NECK SPINE W/O DYE: CPT

## 2023-04-13 ASSESSMENT — ENCOUNTER SYMPTOMS
NAUSEA: 0
APNEA: 0
VOMITING: 0
DIARRHEA: 0
FACIAL SWELLING: 0
CONSTIPATION: 0
VOICE CHANGE: 0
BLOOD IN STOOL: 0
EYE DISCHARGE: 0
CHOKING: 0
SINUS PRESSURE: 0
SORE THROAT: 0
SHORTNESS OF BREATH: 0

## 2023-04-13 NOTE — PROGRESS NOTES
Instructed Kylah from [facility] on pt's discharge instructions, follow up appts and discharge medications, next doses and meds that have been discontinued. Pt is now leaving with Kylah to return to [facility].  Electronically signed by Jenelle Wayne RN on 4/12/2023 at 8:52 PM

## 2023-04-13 NOTE — CARE COORDINATION
SW attempted to call [facility] 15x and left messages. Sw will wait for call back. Will attempt to contact [facility] again.   [facility]  P [phone]

## 2023-04-13 NOTE — PROGRESS NOTES
Pt discharged back to [facility]. Pt instructed on discharge instructions. Pt given a copy of the instructions and medications to continue and next doses. Pt signed discharge.  Electronically signed by Juan C Sheppard RN on 4/12/2023 at 8:49 PM

## 2023-04-13 NOTE — ED PROVIDER NOTES
[facility]  eMERGENCY dEPARTMENT eNCOUnter      Pt Name: Julio Ramirez  MRN: [id number]  [place] 1975  Date of evaluation: 4/13/2023  Provider: Kaye Michelle MD    [address] [place] [address]       Chief Complaint   Patient presents with    Fall     Hit head floor, NO LOC         HISTORY OF PRESENT ILLNESS   (Location/Symptom, Timing/Onset,Context/Setting, Quality, Duration, Modifying Factors, Severity)  Note limiting factors. Julio Ramirez is a 41 y.o. female who presents to the emergency department valuation of injuries from a fall    38 female with a resident of [facility] with a history of traumatic brain injury. Had a simple fall this morning and hit the back of her head. Has a large hematoma of the scalp. No report of loss of consciousness. She appears baseline neurologic state. Review of chart shows she just got out of the hospital with urinary sepsis. The history is provided by the patient and medical records. NursingNotes were reviewed. REVIEW OF SYSTEMS    (2-9 systems for level 4, 10 or more for level 5)     Review of Systems   Constitutional:  Negative for chills and fever. Recent hospitalization with urinary tract infection. HENT:  Negative for congestion, drooling, facial swelling, nosebleeds, sinus pressure, sore throat and voice change. Eyes:  Negative for discharge. Respiratory:  Negative for apnea, choking and shortness of breath. Cardiovascular:  Negative for chest pain and leg swelling. Gastrointestinal:  Negative for blood in stool, constipation, diarrhea, nausea and vomiting. Genitourinary:  Negative for enuresis. Musculoskeletal:  Negative for joint swelling. Skin:  Negative for rash and wound. Neurological:  Negative for seizures and syncope. Psychiatric/Behavioral:  Negative for behavioral problems, hallucinations and suicidal ideas. All other systems reviewed and are negative.     A complete review of systems was performed and is

## 2023-04-14 ENCOUNTER — OFFICE VISIT (OUTPATIENT)
Dept: WOUND CARE | Facility: HOSPITAL | Age: 48
End: 2023-04-14
Payer: MEDICAID

## 2023-04-14 PROCEDURE — G0463 HOSPITAL OUTPT CLINIC VISIT: HCPCS

## 2023-04-15 LAB
BACTERIA BLD CULT ORG #2: NORMAL
BACTERIA BLD CULT: NORMAL

## 2023-04-21 ENCOUNTER — OFFICE VISIT (OUTPATIENT)
Dept: WOUND CARE | Facility: HOSPITAL | Age: 48
End: 2023-04-21
Payer: MEDICAID

## 2023-04-24 ENCOUNTER — TELEPHONE (OUTPATIENT)
Dept: NEUROSURGERY | Age: 48
End: 2023-04-24

## 2023-04-24 NOTE — TELEPHONE ENCOUNTER
Called facility to confirm appt . Asked if this appt was neccessary stated this was a hfu from 4- saw dr Leidy Lainez on 4- back in ed but he did go over MRI. Please call facility to let them know if appt is needed .

## 2023-04-24 NOTE — TELEPHONE ENCOUNTER
Per consult note, appt tomorrow is not needed and to be r.s for 6 months with repeat MRI. Appt r/s and NR VU.

## 2023-04-25 ENCOUNTER — HOSPITAL ENCOUNTER (EMERGENCY)
Facility: HOSPITAL | Age: 48
Discharge: HOME | End: 2023-04-25
Attending: FAMILY MEDICINE | Admitting: FAMILY MEDICINE
Payer: MEDICAID

## 2023-04-25 ENCOUNTER — APPOINTMENT (OUTPATIENT)
Dept: CT IMAGING | Facility: HOSPITAL | Age: 48
End: 2023-04-25
Payer: MEDICAID

## 2023-04-25 ENCOUNTER — TELEPHONE (OUTPATIENT)
Dept: NEUROLOGY | Age: 48
End: 2023-04-25

## 2023-04-25 VITALS
DIASTOLIC BLOOD PRESSURE: 64 MMHG | TEMPERATURE: 97.9 F | RESPIRATION RATE: 20 BRPM | WEIGHT: 210 LBS | OXYGEN SATURATION: 97 % | SYSTOLIC BLOOD PRESSURE: 107 MMHG | HEART RATE: 104 BPM | HEIGHT: 69 IN | BODY MASS INDEX: 31.1 KG/M2
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
## 2023-04-25 DIAGNOSIS — S09.90XA INJURY OF HEAD, INITIAL ENCOUNTER: ICD-10-CM

## 2023-04-25 PROCEDURE — 72125 CT NECK SPINE W/O DYE: CPT

## 2023-04-25 PROCEDURE — 99283 EMERGENCY DEPT VISIT LOW MDM: CPT

## 2023-04-25 PROCEDURE — 70450 CT HEAD/BRAIN W/O DYE: CPT

## 2023-04-25 NOTE — TELEPHONE ENCOUNTER
Baylee Mercer, the Manager from [facility] called to schedule an  appt for the patient with Dr. Bartolo Lopez. She is certain that the patient had been seen by the doctor previously for falls and balance issues, which now has worsened. On checking the chart, it seems that the patient was seen for an abnormal CT of the head, not sure it the injury was sustained as a result of a fall. Please give Baylee Mercer a call back to discuss.     Thank you

## 2023-04-25 NOTE — ED PROVIDER NOTES
Subjective   History of Present Illness  47-year-old female tripped and fell.  Patient hit the back of her head.  Patient states that she did not have any loss of consciousness.  Patient has a slight pain in the back of her head.  Patient denies any visual changes.  Patient denies any slurred speech.  [facility] at [facility]  Patient has a history of a traumatic brain injury.  Patient currently denies any pain in her upper extremities or lower extremities.  Patient denies any other symptoms at this time.        Review of Systems   All other systems reviewed and are negative.      Past Medical History:   Diagnosis Date   • Anxiety    • Asthma    • Balance problems    • Chronic back pain    • Chronic hip pain    • Depression    • Diabetes mellitus    • Edema of both legs    • Hypertension    • Meningioma    • Restless leg syndrome    • Substance abuse in remission    • Traumatic brain injury        Allergies   Allergen Reactions   • Penicillins Other (See Comments)     As a child   • Tramadol Nausea And Vomiting       Past Surgical History:   Procedure Laterality Date   • BACK SURGERY      L5-L6 surgery   •  SECTION     • LEG SURGERY      Left leg jose ramon placement   • PEG TUBE INSERTION     • PEG TUBE REMOVAL     • TRACHEOSTOMY      with reversal   • TUBAL ABDOMINAL LIGATION         Family History   Problem Relation Age of Onset   • Heart murmur Mother    • Heart disease Father    • No Known Problems Brother    • No Known Problems Sister    • No Known Problems Brother        Social History     Socioeconomic History   • Marital status: Single   Tobacco Use   • Smoking status: Former     Packs/day: 0.00     Types: Electronic Cigarette, Cigarettes     Passive exposure: Past   • Smokeless tobacco: Never   Vaping Use   • Vaping Use: Former   Substance and Sexual Activity   • Alcohol use: No   • Drug use: No     Comment: history of, narcotics   • Sexual activity: Never     Partners: Male     Birth  control/protection: Injection     Comment: last act of intercourse a year ago           Objective   Physical Exam  Vitals and nursing note reviewed.   Constitutional:       Appearance: Normal appearance.   HENT:      Head: Normocephalic and atraumatic.      Mouth/Throat:      Mouth: Mucous membranes are moist.   Eyes:      Extraocular Movements: Extraocular movements intact.      Pupils: Pupils are equal, round, and reactive to light.   Cardiovascular:      Rate and Rhythm: Normal rate and regular rhythm.      Heart sounds: Normal heart sounds.   Pulmonary:      Effort: Pulmonary effort is normal.      Breath sounds: Normal breath sounds.   Abdominal:      General: Bowel sounds are normal.      Palpations: Abdomen is soft.      Tenderness: There is no abdominal tenderness.   Musculoskeletal:      Cervical back: Normal range of motion and neck supple.   Skin:     General: Skin is warm and dry.   Neurological:      General: No focal deficit present.      Mental Status: She is alert and oriented to person, place, and time.   Psychiatric:         Mood and Affect: Mood normal.         Behavior: Behavior normal.         Procedures           ED Course                                         CT Head Without Contrast   Final Result   1. 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   2. No acute intracranial abnormality or acute traumatic injury is seen.                                                       This report was finalized on 04/25/2023 15:08 by Dr. Bashir Gambino MD.      CT Cervical Spine Without Contrast   Final Result   No fracture, no acute osseous cervical spine abnormality.                   This report was finalized on 04/25/2023 15:08 by Dr. Nirmal Ureña MD.          Medical Decision Making  47-year-old female presents emergency room after having a fall.  Patient tripped and fell.  Patient CT scan of the head and C-spine was negative for any acute  findings.  Patient is in not any distress.  Patient will be discharged home in stable condition.  Patient was advised to follow-up with her primary care provider in outpatient basis.  Patient was advised to return to emergency room with new or worsening symptoms.  All questions were answered for the patient prior to discharge.  Patient verbalized understanding was agreeable to plan as discussed.    Fall, initial encounter: acute illness or injury  Injury of head, initial encounter: acute illness or injury  Amount and/or Complexity of Data Reviewed  Radiology: ordered. Decision-making details documented in ED Course.          Final diagnoses:   Fall, initial encounter   Injury of head, initial encounter       ED Disposition  ED Disposition     ED Disposition   Discharge    Condition   Stable    Comment   --             Judith Mauro MD  [address] [place] [address] DR ANTOINE [address] [place] [address] [id number] [phone]    Schedule an appointment as soon as possible for a visit       [facility] Emergency Department  [address] [place] [id number] [phone]    As needed, If symptoms worsen         Medication List      Changed    LORazepam 1 MG tablet  Commonly known as: ATIVAN  Take 1 tablet by mouth Every 8 (Eight) Hours As Needed for Anxiety.  What changed:   · how much to take  · when to take this             Max Palacios MD  04/25/23 1047

## 2023-04-26 NOTE — TELEPHONE ENCOUNTER
Called and spoke with Tessa Long at [facility] regarding scheduling patient an appointment. Tessa Long is aware of when I have patient scheduled an appointment with Dr. Roula Rahman.

## 2023-04-28 ENCOUNTER — OFFICE VISIT (OUTPATIENT)
Dept: WOUND CARE | Facility: HOSPITAL | Age: 48
End: 2023-04-28
Payer: MEDICAID

## 2023-05-05 ENCOUNTER — OFFICE VISIT (OUTPATIENT)
Dept: WOUND CARE | Facility: HOSPITAL | Age: 48
End: 2023-05-05
Payer: MEDICAID

## 2023-05-05 PROCEDURE — G0463 HOSPITAL OUTPT CLINIC VISIT: HCPCS

## 2023-05-11 ENCOUNTER — PRE-ADMISSION TESTING (OUTPATIENT)
Dept: PREADMISSION TESTING | Facility: HOSPITAL | Age: 48
End: 2023-05-11
Payer: MEDICAID

## 2023-05-11 VITALS
DIASTOLIC BLOOD PRESSURE: 99 MMHG | RESPIRATION RATE: 18 BRPM | WEIGHT: 218.92 LBS | BODY MASS INDEX: 34.36 KG/M2 | HEART RATE: 100 BPM | HEIGHT: 67 IN | OXYGEN SATURATION: 97 % | SYSTOLIC BLOOD PRESSURE: 163 MMHG

## 2023-05-11 LAB
ANION GAP SERPL CALCULATED.3IONS-SCNC: 11 MMOL/L (ref 5–15)
BUN SERPL-MCNC: 15 MG/DL (ref 6–20)
BUN/CREAT SERPL: 20.5 (ref 7–25)
CALCIUM SPEC-SCNC: 8.9 MG/DL (ref 8.6–10.5)
CHLORIDE SERPL-SCNC: 104 MMOL/L (ref 98–107)
CO2 SERPL-SCNC: 21 MMOL/L (ref 22–29)
CREAT SERPL-MCNC: 0.73 MG/DL (ref 0.57–1)
DEPRECATED RDW RBC AUTO: 45.5 FL (ref 37–54)
EGFRCR SERPLBLD CKD-EPI 2021: 103.5 ML/MIN/1.73
ERYTHROCYTE [DISTWIDTH] IN BLOOD BY AUTOMATED COUNT: 15.5 % (ref 12.3–15.4)
GLUCOSE SERPL-MCNC: 127 MG/DL (ref 65–99)
HCT VFR BLD AUTO: 31.3 % (ref 34–46.6)
HGB BLD-MCNC: 9.6 G/DL (ref 12–15.9)
MCH RBC QN AUTO: 24.9 PG (ref 26.6–33)
MCHC RBC AUTO-ENTMCNC: 30.7 G/DL (ref 31.5–35.7)
MCV RBC AUTO: 81.3 FL (ref 79–97)
PLATELET # BLD AUTO: 300 10*3/MM3 (ref 140–450)
PMV BLD AUTO: 9 FL (ref 6–12)
POTASSIUM SERPL-SCNC: 4.7 MMOL/L (ref 3.5–5.2)
RBC # BLD AUTO: 3.85 10*6/MM3 (ref 3.77–5.28)
SODIUM SERPL-SCNC: 136 MMOL/L (ref 136–145)
WBC NRBC COR # BLD: 6.29 10*3/MM3 (ref 3.4–10.8)

## 2023-05-11 PROCEDURE — 80048 BASIC METABOLIC PNL TOTAL CA: CPT

## 2023-05-11 PROCEDURE — 85027 COMPLETE CBC AUTOMATED: CPT

## 2023-05-11 PROCEDURE — 36415 COLL VENOUS BLD VENIPUNCTURE: CPT

## 2023-05-11 RX ORDER — GINSENG 100 MG
1 CAPSULE ORAL 2 TIMES DAILY
Status: ON HOLD | COMMUNITY
End: 2023-05-18

## 2023-05-11 NOTE — DISCHARGE INSTRUCTIONS
Before you come to the hospital        Arrival time: AS DIRECTED BY OFFICE     YOU MAY TAKE THE FOLLOWING MEDICATION(S) THE MORNING OF SURGERY WITH A SIP OF WATER: Pregabalin, propranolol and percocet           ALL OTHER HOME MEDICATION CHECK WITH YOUR PHYSICIAN (especially if   you are taking diabetes medicines or blood thinners)    Do not take any Erectile Dysfunction medications (EX: CIALIS, VIAGRA) 24 hours prior to surgery.      If you were given and instructed to use a germ- killing soap, use as directed the night before surgery and again the morning of surgery or as directed by your surgeon. (Use one-half of the bottle with each shower.)   See attached information for How to Use Chlorhexidine for Bathing if applicable.            Eating and drinking restrictions prior to scheduled arrival time    2 Hours before arrival time STOP   Drinking Clear liquids (water, black coffee-NO CREAM,  apple juice-no pulp)      8 Hours before arrival time STOP   All food, full liquids, and dairy products    (It is extremely important that you follow these guidelines to prevent delay or cancelation of your procedure)     Clear Liquids  Water and flavored water                                                                      Clear Fruit juices, such as cranberry juice and apple juice.  Black coffee (NO cream of any kind, including powdered).  Plain tea  Clear bouillon or broth.  Flavored gelatin.  Soda.  Gatorade or Powerade.  Full liquid examples  Juices that have pulp.  Frozen ice pops that contain fruit pieces.  Coffee with creamer  Milk.  Yogurt.                MANAGING PAIN AFTER SURGERY    We know you are probably wondering what your pain will be like after surgery.  Following surgery it is unrealistic to expect you will not have pain.   Pain is how our bodies let us know that something is wrong or cautions us to be careful.  That said, our goal is to make your pain tolerable.    Methods we may use to treat your pain  include (oral or IV medications, PCAs, epidurals, nerve blocks, etc.)   While some procedures require IV pain medications for a short time after surgery, transitioning to pain medications by mouth allows for better management of pain.   Your nurse will encourage you to take oral pain medications whenever possible.  IV medications work almost immediately, but only last a short while.  Taking medications by mouth allows for a more constant level of medication in your blood stream for a longer period of time.      Once your pain is out of control it is harder to get back under control.  It is important you are aware when your next dose of pain medication is due.  If you are admitted, your nurse may write the time of your next dose on the white board in your room to help you remember.      We are interested in your pain and encourage you to inform us about aggravating factors during your visit.   Many times a simple repositioning every few hours can make a big difference.    If your physician says it is okay, do not let your pain prevent you from getting out of bed. Be sure to call your nurse for assistance prior to getting up so you do not fall.      Before surgery, please decide your tolerable pain goal.  These faces help describe the pain ratings we use on a 0-10 scale.   Be prepared to tell us your goal and whether or not you take pain or anxiety medications at home.          Preparing for Surgery  Preparing for surgery is an important part of your care. It can make things go more smoothly and help you avoid complications. The steps leading up to surgery may vary among hospitals. Follow all instructions given to you by your health care providers. Ask questions if you do not understand something. Talk about any concerns that you have.  Here are some questions to consider asking before your surgery:  If my surgery is not an emergency (is elective), when would be the best time to have the surgery?  What arrangements do  I need to make for work, home, or school?  What will my recovery be like? How long will it be before I can return to normal activities?  Will I need to prepare my home? Will I need to arrange care for me or my children?  Should I expect to have pain after surgery? What are my pain management options? Are there nonmedical options that I can try for pain?  Tell a health care provider about:  Any allergies you have.  All medicines you are taking, including vitamins, herbs, eye drops, creams, and over-the-counter medicines.  Any problems you or family members have had with anesthetic medicines.  Any blood disorders you have.  Any surgeries you have had.  Any medical conditions you have.  Whether you are pregnant or may be pregnant.  What are the risks?  The risks and complications of surgery depend on the specific procedure that you have. Discuss all the risks with your health care providers before your surgery. Ask about common surgical complications, which may include:  Infection.  Bleeding or a need for blood replacement (transfusion).  Allergic reactions to medicines.  Damage to surrounding nerves, tissues, or structures.  A blood clot.  Scarring.  Failure of the surgery to correct the problem.  Follow these instructions before the procedure:  Several days or weeks before your procedure  You may have a physical exam by your primary health care provider to make sure it is safe for you to have surgery.  You may have testing. This may include a chest X-ray, blood and urine tests, electrocardiogram (ECG), or other testing.  Ask your health care provider about:  Changing or stopping your regular medicines. This is especially important if you are taking diabetes medicines or blood thinners.  Taking medicines such as aspirin and ibuprofen. These medicines can thin your blood. Do not take these medicines unless your health care provider tells you to take them.  Taking over-the-counter medicines, vitamins, herbs, and  supplements.  Do not use any products that contain nicotine or tobacco, such as cigarettes and e-cigarettes. If you need help quitting, ask your health care provider.  Avoid alcohol.  Ask your health care provider if there are exercises you can do to prepare for surgery.  Eat a healthy diet.   Plan to have someone 18 years of age or older to take you home from the hospital. We will need to verify your ride on the morning of surgery if you are being discharged home on the same day. Tell your ride to be expecting a call from the hospital prior to your procedure.   Plan to have a responsible adult care for you for at least 24 hours after you leave the hospital or clinic. This is important.  The day before your procedure  You may be given antibiotic medicine to take by mouth to help prevent infection. Take it as told by your health care provider.  You may be asked to shower with a germ-killing soap.  Follow instructions from your health care provider about eating and drinking restrictions. This includes gum, mints and hard candy.  Pack comfortable clothes according to your procedure.   The day of your procedure  You may need to take another shower with a germ-killing soap before you leave home in the morning.  With a small sip of water, take only the medicines that you are told to take.  Remove all jewelry including rings.   Leave anything you consider valuable at home except hearing aids if needed.  You do not need to bring your home medications into the hospital.   Do not wear any makeup, nail polish, powder, deodorant, lotion, hair accessories, or anything on your skin or body except your clothes.  If you will be staying in the hospital, bring a case to hold your glasses, contacts, or dentures. You may also want to bring your robe and non-skid footwear.       (Do not use denture adhesives since you will be asked to remove them during  surgery).   If you wear oxygen at home, bring it with you the day of surgery.  If  instructed by your health care provider, bring your sleep apnea device with you on the day of your surgery (if this applies to you).  You may want to leave your suitcase and sleep apnea device in the car until after surgery.   Arrive at the hospital as scheduled.  Bring a friend or family member with you who can help to answer questions and be present while you meet with your health care provider.  At the hospital  When you arrive at the hospital:  Go to registration located at the main entrance of the hospital. You will be registered and given a beeper and a sticker sheet. Take the stickers to the Outpatient nurses desk and place in the black tray. This is to notify staff that you have arrived. Then return to the lobby to wait.   When your beeper lights up and vibrates proceed through the double doors, under the stairs, and a member of the Outpatient Surgery staff will escort you to your preoperative room.  You may have to wear compression sleeves. These help to prevent blood clots and reduce swelling in your legs.  An IV may be inserted into one of your veins.              In the operating room, you may be given one or more of the following:        A medicine to help you relax (sedative).        A medicine to numb the area (local anesthetic).        A medicine to make you fall asleep (general anesthetic).        A medicine that is injected into an area of your body to numb everything below the                      injection site (regional anesthetic).  You may be given an antibiotic through your IV to help prevent infection.  Your surgical site will be marked or identified.    Contact a health care provider if you:  Develop a fever of more than 100.4°F (38°C) or other feelings of illness during the 48 hours before your surgery.  Have symptoms that get worse.  Have questions or concerns about your surgery.  Summary  Preparing for surgery can make the procedure go more smoothly and lower your risk of  complications.  Before surgery, make a list of questions and concerns to discuss with your surgeon. Ask about the risks and possible complications.  In the days or weeks before your surgery, follow all instructions from your health care provider. You may need to stop smoking, avoid alcohol, follow eating restrictions, and change or stop your regular medicines.  Contact your surgeon if you develop a fever or other signs of illness during the few days before your surgery.  This information is not intended to replace advice given to you by your health care provider. Make sure you discuss any questions you have with your health care provider.  Document Revised: 12/21/2018 Document Reviewed: 10/23/2018  [employer]vier Patient Education © 2021 [employer]

## 2023-05-17 ENCOUNTER — TELEPHONE (OUTPATIENT)
Dept: VASCULAR SURGERY | Facility: CLINIC | Age: 48
End: 2023-05-17
Payer: MEDICAID

## 2023-05-17 NOTE — TELEPHONE ENCOUNTER
RN staff expressed understanding of arriving at 6 am for procedure with Dr. Cabrera on 05/18/23.  NPO after midnight and per prework only take Pregabalin, propranolol and percocet

## 2023-05-18 ENCOUNTER — ANESTHESIA EVENT (OUTPATIENT)
Dept: PERIOP | Facility: HOSPITAL | Age: 48
End: 2023-05-18
Payer: MEDICAID

## 2023-05-18 ENCOUNTER — HOSPITAL ENCOUNTER (OUTPATIENT)
Facility: HOSPITAL | Age: 48
Setting detail: HOSPITAL OUTPATIENT SURGERY
Discharge: HOME | End: 2023-05-18
Attending: SURGERY | Admitting: SURGERY
Payer: MEDICAID

## 2023-05-18 ENCOUNTER — ANESTHESIA (OUTPATIENT)
Dept: PERIOP | Facility: HOSPITAL | Age: 48
End: 2023-05-18
Payer: MEDICAID

## 2023-05-18 ENCOUNTER — APPOINTMENT (OUTPATIENT)
Dept: ULTRASOUND IMAGING | Facility: HOSPITAL | Age: 48
End: 2023-05-18
Payer: MEDICAID

## 2023-05-18 VITALS
TEMPERATURE: 98.4 F | RESPIRATION RATE: 14 BRPM | OXYGEN SATURATION: 94 % | SYSTOLIC BLOOD PRESSURE: 120 MMHG | DIASTOLIC BLOOD PRESSURE: 79 MMHG | HEART RATE: 80 BPM

## 2023-05-18 DIAGNOSIS — I87.2 VENOUS (PERIPHERAL) INSUFFICIENCY: ICD-10-CM

## 2023-05-18 DIAGNOSIS — R60.0 EDEMA OF BOTH LOWER EXTREMITIES: ICD-10-CM

## 2023-05-18 DIAGNOSIS — I83.219 VARICOSE VEINS OF LOWER EXTREMITIES WITH ULCER AND INFLAMMATION: ICD-10-CM

## 2023-05-18 DIAGNOSIS — L97.929 VARICOSE VEINS OF LOWER EXTREMITIES WITH ULCER AND INFLAMMATION: ICD-10-CM

## 2023-05-18 DIAGNOSIS — I83.229 VARICOSE VEINS OF LOWER EXTREMITIES WITH ULCER AND INFLAMMATION: ICD-10-CM

## 2023-05-18 DIAGNOSIS — L97.919 VARICOSE VEINS OF LOWER EXTREMITIES WITH ULCER AND INFLAMMATION: ICD-10-CM

## 2023-05-18 DIAGNOSIS — I87.331 CHRONIC VENOUS HYPERTENSION WITH ULCER AND INFLAMMATION INVOLVING RIGHT SIDE: ICD-10-CM

## 2023-05-18 LAB
GLUCOSE BLDC GLUCOMTR-MCNC: 113 MG/DL (ref 70–130)
GLUCOSE BLDC GLUCOMTR-MCNC: 129 MG/DL (ref 70–130)
HCG SERPL QL: NEGATIVE

## 2023-05-18 PROCEDURE — 25010000002 FENTANYL CITRATE (PF) 100 MCG/2ML SOLUTION: Performed by: NURSE ANESTHETIST, CERTIFIED REGISTERED

## 2023-05-18 PROCEDURE — 25010000002 PROPOFOL 1000 MG/100ML EMULSION: Performed by: NURSE ANESTHETIST, CERTIFIED REGISTERED

## 2023-05-18 PROCEDURE — 0 LIDOCAINE 1 % SOLUTION: Performed by: SURGERY

## 2023-05-18 PROCEDURE — C1888 ENDOVAS NON-CARDIAC ABL CATH: HCPCS | Performed by: SURGERY

## 2023-05-18 PROCEDURE — C1894 INTRO/SHEATH, NON-LASER: HCPCS | Performed by: SURGERY

## 2023-05-18 PROCEDURE — 76937 US GUIDE VASCULAR ACCESS: CPT

## 2023-05-18 PROCEDURE — 84703 CHORIONIC GONADOTROPIN ASSAY: CPT | Performed by: SURGERY

## 2023-05-18 PROCEDURE — 82948 REAGENT STRIP/BLOOD GLUCOSE: CPT

## 2023-05-18 RX ORDER — IBUPROFEN 600 MG/1
600 TABLET ORAL ONCE AS NEEDED
Status: DISCONTINUED | OUTPATIENT
Start: 2023-05-18 | End: 2023-05-18 | Stop reason: HOSPADM

## 2023-05-18 RX ORDER — SODIUM CHLORIDE 0.9 % (FLUSH) 0.9 %
3 SYRINGE (ML) INJECTION EVERY 12 HOURS SCHEDULED
Status: DISCONTINUED | OUTPATIENT
Start: 2023-05-18 | End: 2023-05-18 | Stop reason: HOSPADM

## 2023-05-18 RX ORDER — FENTANYL CITRATE 50 UG/ML
25 INJECTION, SOLUTION INTRAMUSCULAR; INTRAVENOUS
Status: DISCONTINUED | OUTPATIENT
Start: 2023-05-18 | End: 2023-05-18 | Stop reason: HOSPADM

## 2023-05-18 RX ORDER — LIDOCAINE HYDROCHLORIDE 10 MG/ML
0.5 INJECTION, SOLUTION EPIDURAL; INFILTRATION; INTRACAUDAL; PERINEURAL ONCE AS NEEDED
Status: DISCONTINUED | OUTPATIENT
Start: 2023-05-18 | End: 2023-05-18 | Stop reason: HOSPADM

## 2023-05-18 RX ORDER — FENTANYL CITRATE 50 UG/ML
INJECTION, SOLUTION INTRAMUSCULAR; INTRAVENOUS AS NEEDED
Status: DISCONTINUED | OUTPATIENT
Start: 2023-05-18 | End: 2023-05-18 | Stop reason: SURG

## 2023-05-18 RX ORDER — LIDOCAINE HYDROCHLORIDE 20 MG/ML
INJECTION, SOLUTION EPIDURAL; INFILTRATION; INTRACAUDAL; PERINEURAL AS NEEDED
Status: DISCONTINUED | OUTPATIENT
Start: 2023-05-18 | End: 2023-05-18 | Stop reason: SURG

## 2023-05-18 RX ORDER — MAGNESIUM OXIDE 400 MG/1
400 TABLET ORAL 2 TIMES DAILY
COMMUNITY

## 2023-05-18 RX ORDER — SODIUM CHLORIDE 0.9 % (FLUSH) 0.9 %
3 SYRINGE (ML) INJECTION AS NEEDED
Status: DISCONTINUED | OUTPATIENT
Start: 2023-05-18 | End: 2023-05-18 | Stop reason: HOSPADM

## 2023-05-18 RX ORDER — OXYCODONE AND ACETAMINOPHEN 7.5; 325 MG/1; MG/1
1 TABLET ORAL EVERY 4 HOURS PRN
Status: DISCONTINUED | OUTPATIENT
Start: 2023-05-18 | End: 2023-05-18 | Stop reason: HOSPADM

## 2023-05-18 RX ORDER — SODIUM CHLORIDE 0.9 % (FLUSH) 0.9 %
3-10 SYRINGE (ML) INJECTION AS NEEDED
Status: DISCONTINUED | OUTPATIENT
Start: 2023-05-18 | End: 2023-05-18 | Stop reason: HOSPADM

## 2023-05-18 RX ORDER — SODIUM CHLORIDE, SODIUM LACTATE, POTASSIUM CHLORIDE, CALCIUM CHLORIDE 600; 310; 30; 20 MG/100ML; MG/100ML; MG/100ML; MG/100ML
1000 INJECTION, SOLUTION INTRAVENOUS CONTINUOUS
Status: DISCONTINUED | OUTPATIENT
Start: 2023-05-18 | End: 2023-05-18 | Stop reason: HOSPADM

## 2023-05-18 RX ORDER — SODIUM CHLORIDE 9 MG/ML
INJECTION, SOLUTION INTRAVENOUS AS NEEDED
Status: DISCONTINUED | OUTPATIENT
Start: 2023-05-18 | End: 2023-05-18 | Stop reason: HOSPADM

## 2023-05-18 RX ORDER — NALOXONE HCL 0.4 MG/ML
0.4 VIAL (ML) INJECTION AS NEEDED
Status: DISCONTINUED | OUTPATIENT
Start: 2023-05-18 | End: 2023-05-18 | Stop reason: HOSPADM

## 2023-05-18 RX ORDER — SODIUM CHLORIDE, SODIUM LACTATE, POTASSIUM CHLORIDE, CALCIUM CHLORIDE 600; 310; 30; 20 MG/100ML; MG/100ML; MG/100ML; MG/100ML
100 INJECTION, SOLUTION INTRAVENOUS CONTINUOUS
Status: DISCONTINUED | OUTPATIENT
Start: 2023-05-18 | End: 2023-05-18 | Stop reason: HOSPADM

## 2023-05-18 RX ORDER — OXYCODONE HYDROCHLORIDE AND ACETAMINOPHEN 5; 325 MG/1; MG/1
1 TABLET ORAL ONCE AS NEEDED
Status: DISCONTINUED | OUTPATIENT
Start: 2023-05-18 | End: 2023-05-18 | Stop reason: HOSPADM

## 2023-05-18 RX ORDER — LABETALOL HYDROCHLORIDE 5 MG/ML
5 INJECTION, SOLUTION INTRAVENOUS
Status: DISCONTINUED | OUTPATIENT
Start: 2023-05-18 | End: 2023-05-18 | Stop reason: HOSPADM

## 2023-05-18 RX ORDER — LIDOCAINE HYDROCHLORIDE 10 MG/ML
INJECTION, SOLUTION INFILTRATION; PERINEURAL AS NEEDED
Status: DISCONTINUED | OUTPATIENT
Start: 2023-05-18 | End: 2023-05-18 | Stop reason: HOSPADM

## 2023-05-18 RX ORDER — SODIUM CHLORIDE 9 MG/ML
40 INJECTION, SOLUTION INTRAVENOUS AS NEEDED
Status: DISCONTINUED | OUTPATIENT
Start: 2023-05-18 | End: 2023-05-18 | Stop reason: HOSPADM

## 2023-05-18 RX ORDER — FLUMAZENIL 0.1 MG/ML
0.2 INJECTION INTRAVENOUS AS NEEDED
Status: DISCONTINUED | OUTPATIENT
Start: 2023-05-18 | End: 2023-05-18 | Stop reason: HOSPADM

## 2023-05-18 RX ORDER — CLINDAMYCIN PHOSPHATE 900 MG/50ML
900 INJECTION, SOLUTION INTRAVENOUS ONCE
Status: COMPLETED | OUTPATIENT
Start: 2023-05-18 | End: 2023-05-18

## 2023-05-18 RX ORDER — PROPOFOL 10 MG/ML
INJECTION, EMULSION INTRAVENOUS AS NEEDED
Status: DISCONTINUED | OUTPATIENT
Start: 2023-05-18 | End: 2023-05-18 | Stop reason: SURG

## 2023-05-18 RX ADMIN — CLINDAMYCIN PHOSPHATE 900 MG: 900 INJECTION, SOLUTION INTRAVENOUS at 08:33

## 2023-05-18 RX ADMIN — FENTANYL CITRATE 50 MCG: 50 INJECTION, SOLUTION INTRAMUSCULAR; INTRAVENOUS at 08:30

## 2023-05-18 RX ADMIN — SODIUM CHLORIDE, POTASSIUM CHLORIDE, SODIUM LACTATE AND CALCIUM CHLORIDE 1000 ML: 600; 310; 30; 20 INJECTION, SOLUTION INTRAVENOUS at 07:51

## 2023-05-18 RX ADMIN — LIDOCAINE HYDROCHLORIDE 50 MG: 20 INJECTION, SOLUTION EPIDURAL; INFILTRATION; INTRACAUDAL; PERINEURAL at 08:33

## 2023-05-18 RX ADMIN — FENTANYL CITRATE 50 MCG: 50 INJECTION, SOLUTION INTRAMUSCULAR; INTRAVENOUS at 08:35

## 2023-05-18 RX ADMIN — PROPOFOL 74 MCG/KG/MIN: 10 INJECTION, EMULSION INTRAVENOUS at 08:34

## 2023-05-18 RX ADMIN — PROPOFOL 50 MG: 10 INJECTION, EMULSION INTRAVENOUS at 08:33

## 2023-05-18 NOTE — H&P
Zahida Chaudhryer  [id number]  [id number]  [facility] OR/[facility] OR  Douglas Cabrera MD  2023    CC: Venous insufficiency, right lower extremity wounds    HPI: This is a 47-year-old female with past history of traumatic brain injury with mild cognitive delay, hypertension, diabetes, and asthma.  She follows with neuro restorative.  She has been following with our wound care center here at [facility] due to superficial ulceration to the right medial malleolus region, as well as a wound to the right first toe.  She notes a chronic history of lower extremity edema that is worse with prolonged standing and somewhat improved with leg elevation and lying supine.  Due to her edema she has been wearing compression stockings in the 20 to 30 mmHg range for several months and also using leg elevation to try and help reduce her edema symptoms but despite this they persist.  Part of work-up through the wound care center included a venous duplex with insufficiency study which I reviewed.  It does show evidence of significant reflux in the bilateral greater saphenous veins, the bilateral lesser saphenous veins, and the right anterior accessory saphenous vein.  She also had some deep venous reflux in the right popliteal vein, and in the left common femoral vein.  Despite ongoing conservative management with compression, leg elevation, and exercise, as well as local wound care wounds persist.  She otherwise denies any arterial symptoms with no claudication or rest pain.  She has no other acute complaints.    Past Medical History:   Diagnosis Date    Anxiety     Asthma     Balance problems     Chronic back pain     Chronic hip pain     Depression     Diabetes mellitus     Edema of both legs     Hypertension     Meningioma     Restless leg syndrome     Restless leg syndrome     Substance abuse in remission     Traumatic brain injury        Past Surgical History:   Procedure Laterality Date    BACK SURGERY      L5-L6 surgery      SECTION      LEG SURGERY      Left leg jose ramon placement    PEG TUBE INSERTION      PEG TUBE REMOVAL      TRACHEOSTOMY      with reversal    TUBAL ABDOMINAL LIGATION         Family History   Problem Relation Age of Onset    Heart murmur Mother     Heart disease Father     No Known Problems Brother     No Known Problems Sister     No Known Problems Brother        Social History     Socioeconomic History    Marital status: Single   Tobacco Use    Smoking status: Former     Packs/day: 0.00     Types: Electronic Cigarette, Cigarettes     Passive exposure: Past    Smokeless tobacco: Never   Vaping Use    Vaping Use: Former   Substance and Sexual Activity    Alcohol use: No    Drug use: No     Comment: history of, narcotics    Sexual activity: Defer     Partners: Male     Birth control/protection: Injection     Comment: last act of intercourse a year ago       Allergies   Allergen Reactions    Penicillins Other (See Comments)     As a child    Tramadol Nausea And Vomiting       Medications Prior to Admission   Medication Sig Dispense Refill Last Dose    amitriptyline (ELAVIL) 50 MG tablet Take 3 tablets by mouth Every Night.   5/17/2023 at 2000    budesonide-formoterol (SYMBICORT) 80-4.5 MCG/ACT inhaler Inhale 2 puffs 2 (Two) Times a Day.   5/17/2023 at 2000    albuterol (PROVENTIL HFA;VENTOLIN HFA) 108 (90 Base) MCG/ACT inhaler Inhale 2 puffs Every 4 (Four) Hours As Needed.   More than a month    Blood Glucose Monitoring Suppl (FREESTYLE FREEDOM LITE) w/Device kit        calcium carbonate (TUMS) 500 MG chewable tablet Chew 2 tablets 3 (Three) Times a Day As Needed for Heartburn or Indigestion.       CBD oil (cannabidiol) capsule Take 1 capsule by mouth Daily. 8mg       dextromethorphan-guaifenesin (ROBITUSSIN-DM)  MG/5ML syrup Take 10 mL by mouth Every 4 (Four) Hours As Needed (for cough).       docosanol (ABREVA) 10 % cream cream Apply 1 application topically to the appropriate area as directed 5 (Five) Times a Day.    "    esomeprazole (nexIUM) 20 MG capsule Take 1 capsule by mouth Every Morning Before Breakfast.       fluticasone (FLONASE) 50 MCG/ACT nasal spray 2 sprays into the nostril(s) as directed by provider Daily.       glucose (DEX4) 4 GM chewable tablet Chew 4 tablets As Needed for Low Blood Sugar.       Insulin Syringe-Needle U-100 30G X 5/16\" 0.5 ML misc        lamoTRIgine (LaMICtal) 25 MG tablet Take 2 tablets by mouth Every Night.       latanoprost (XALATAN) 0.005 % ophthalmic solution Administer 1 drop to both eyes Every Night.       lidocaine (LIDODERM) 5 % Place 1 patch on the skin as directed by provider Daily As Needed for Moderate Pain. Remove & Discard patch within 12 hours or as directed by MD       loratadine (CLARITIN) 10 MG tablet Take 1 tablet by mouth Daily.       magnesium hydroxide (MILK OF MAGNESIA) 2400 MG/10ML suspension suspension Take 15 mL by mouth 2 (Two) Times a Day As Needed.       medroxyPROGESTERone (DEPO-PROVERA) 150 MG/ML injection Inject 1 mL into the appropriate muscle as directed by prescriber Every 3 (Three) Months.       Melatonin 3 MG capsule Take 1 capsule by mouth Every Night.       Menthol (HALLS COUGH DROPS MT) Apply 1 drop to the mouth or throat As Needed.       metFORMIN (GLUCOPHAGE) 850 MG tablet Take 1 tablet by mouth 3 (Three) Times a Day.       Neomycin-Bacitracin-Polymyxin (TRIPLE ANTIBIOTIC EX) Apply  topically As Needed (to cold sore).       nystatin (MYCOSTATIN) 815032 UNIT/GM powder Apply  topically to the appropriate area as directed 2 (Two) Times a Day.       oxyCODONE-acetaminophen (PERCOCET)  MG per tablet Take 1 tablet by mouth 3 (Three) Times a Day.       Ozempic, 0.25 or 0.5 MG/DOSE, 2 MG/1.5ML solution pen-injector 1 mg 1 (One) Time Per Week. Friday       PARoxetine (PAXIL) 40 MG tablet Take 1 tablet by mouth Every Morning.       phenol (CHLORASEPTIC) 1.4 % liquid liquid Apply 5 sprays to the mouth or throat As Needed.       pregabalin (LYRICA) 100 MG " capsule Take 1 capsule by mouth 3 (Three) Times a Day.       propranolol (INDERAL) 20 MG tablet Take 1 tablet by mouth 3 (Three) Times a Day.       tiZANidine (ZANAFLEX) 4 MG tablet Take 1 tablet by mouth 3 (Three) Times a Day As Needed.            Review of Systems   Constitutional: Negative.  Negative for activity change, appetite change, chills, diaphoresis, fatigue and fever.   HENT: Negative.  Negative for congestion, sneezing, sore throat and trouble swallowing.    Eyes: Negative.  Negative for visual disturbance.   Respiratory: Negative.  Negative for chest tightness and shortness of breath.    Cardiovascular:  Positive for leg swelling. Negative for chest pain and palpitations.   Gastrointestinal: Negative.  Negative for abdominal distention, abdominal pain, nausea and vomiting.   Endocrine: Negative.    Genitourinary: Negative.    Musculoskeletal: Negative.    Skin:  Positive for wound.   Allergic/Immunologic: Negative.    Neurological: Negative.    Hematological: Negative.    Psychiatric/Behavioral: Negative.       /95 (BP Location: Left arm, Patient Position: Sitting)   Pulse 103   Temp 97.5 °F (36.4 °C) (Temporal)   Resp 18   SpO2 95%   Physical Exam  Vitals reviewed.   Constitutional:       Appearance: Normal appearance.   HENT:      Head: Normocephalic and atraumatic.      Nose: Nose normal.      Mouth/Throat:      Mouth: Mucous membranes are moist.   Eyes:      Extraocular Movements: Extraocular movements intact.      Pupils: Pupils are equal, round, and reactive to light.   Cardiovascular:      Rate and Rhythm: Normal rate and regular rhythm.      Pulses: Normal pulses.           Carotid pulses are 2+ on the right side and 2+ on the left side.       Radial pulses are 2+ on the right side and 2+ on the left side.        Femoral pulses are 2+ on the right side and 2+ on the left side.       Popliteal pulses are 2+ on the right side and 2+ on the left side.        Dorsalis pedis pulses are 2+  on the right side and 2+ on the left side.        Posterior tibial pulses are 2+ on the right side and 2+ on the left side.      Comments: Edema of the bilateral lower extremities from ankle to knee.  Multiple small telangiectasias and varicosities throughout the bilateral lower legs.  She also has some hyperpigmentation consistent with stasis dermatitis to the bilateral lower legs.  She has a superficial ulceration to the right lower leg around the medial malleolus.  Some slough but otherwise some granulation.  She also has a small ulceration to the plantar aspect of the right great toe.  Pulmonary:      Effort: Pulmonary effort is normal. No respiratory distress.   Abdominal:      General: There is no distension.      Palpations: Abdomen is soft. There is no mass.      Tenderness: There is no abdominal tenderness.   Musculoskeletal:         General: Normal range of motion.      Cervical back: Normal range of motion and neck supple.      Right lower leg: Edema present.      Left lower leg: Edema present.   Skin:     General: Skin is warm and dry.      Capillary Refill: Capillary refill takes less than 2 seconds.   Neurological:      General: No focal deficit present.      Mental Status: She is alert and oriented to person, place, and time.   Psychiatric:         Mood and Affect: Mood normal.         Behavior: Behavior normal.         Thought Content: Thought content normal.         Judgment: Judgment normal.       Lab Results (last 7 days)       ** No results found for the last 168 hours. **            Imaging Results (Last 7 Days)       ** No results found for the last 168 hours. **            Impression: 47-year-old female with superficial ulceration to the right medial malleolus consistent with a venous stasis ulcer, as well as a wound to the right first toe.  This is in the setting of significant reflux in the bilateral greater and lesser saphenous veins, as well as the right anterior accessory saphenous vein.   Given her persistent wounds and continued reflux symptoms with edema, heaviness, and discomfort despite continued conservative management with compression of the 20 to 30 mmHg range, leg elevation, and exercise over greater than 4 weeks I think she would benefit at this time from right greater saphenous vein and anterior accessory saphenous vein radiofrequency ablation to reduce symptoms and assist in wound healing. Risks of radiofrequency ablation include, but are not limited to, bleeding, infection, vessel damage, nerve damage, DVT, phlebitis, and pulmonary embolus.  The patient understands these risks and wishes to proceed with procedure.  In the meantime she should continue with local care of her wound through the wound care center along with continued compression the 20 to 30 mmHg range, leg elevation, and exercise to help reduce edema and reflux symptoms.    Douglas Cabrera MD

## 2023-05-18 NOTE — ANESTHESIA POSTPROCEDURE EVALUATION
Patient: Zahida Geller    Procedure Summary       Date: 05/18/23 Room / Location: [facility] OR  / [facility] HYBRID OR 12    Anesthesia Start: 0830 Anesthesia Stop: 0924    Procedure: RIGHT LOWER EXTREMITY GREATER SAPHENOUS AND ANTERIOR ACCESSORY SAPHENOUS VEIN RADIO FREQUENCY ABLATION (Right: Leg Lower) Diagnosis:       Chronic venous hypertension with ulcer and inflammation involving right side      Venous (peripheral) insufficiency      Edema of both lower extremities      Varicose veins of lower extremities with ulcer and inflammation      (Chronic venous hypertension with ulcer and inflammation involving right side (HCC) [I87.331, L97.919])      (Venous (peripheral) insufficiency [I87.2])      (Edema of both lower extremities [R60.0])      (Varicose veins of lower extremities with ulcer and inflammation (HCC) [I83.229, I83.219, L97.919, L97.929])    Surgeons: Douglas Cabrera MD Provider: Tyrone Sprague CRNA    Anesthesia Type: MAC ASA Status: 3            Anesthesia Type: MAC    Vitals  Vitals Value Taken Time   /89 05/18/23 0921   Temp     Pulse 94 05/18/23 0924   Resp     SpO2 95 % 05/18/23 0924   Vitals shown include unvalidated device data.        Post Anesthesia Care and Evaluation    Patient location during evaluation: PHASE II  Patient participation: complete - patient participated  Level of consciousness: awake  Pain management: adequate    Airway patency: patent  Anesthetic complications: No anesthetic complications  Respiratory status: acceptable  Hydration status: acceptable

## 2023-05-18 NOTE — ANESTHESIA PREPROCEDURE EVALUATION
Anesthesia Evaluation     Patient summary reviewed and Nursing notes reviewed   no history of anesthetic complications:   NPO Solid Status: > 8 hours  NPO Liquid Status: > 8 hours           Airway   Mallampati: II  TM distance: >3 FB  Neck ROM: full  No difficulty expected  Dental    (+) edentulous    Pulmonary    (+) a smoker Former, asthma,  Cardiovascular   Exercise tolerance: poor (<4 METS)    (+) hypertension  (-) CAD      Neuro/Psych  (+) psychiatric history Anxiety and Depression    ROS Comment: TBI  GI/Hepatic/Renal/Endo    (+) diabetes mellitus type 2  (-) liver disease, no renal disease    Musculoskeletal     Abdominal    Substance History      OB/GYN          Other   blood dyscrasia anemia,                     Anesthesia Plan    ASA 3     MAC     intravenous induction     Anesthetic plan, risks, benefits, and alternatives have been provided, discussed and informed consent has been obtained with: patient.      CODE STATUS:

## 2023-05-18 NOTE — DISCHARGE INSTRUCTIONS
VEIN RADIOFREQUENCY ABLATION DISCHARGE INSTRUCTIONS    Compression therapy  Following the procedure, the initial dressing and the compression wrap should remain in place for 24 hours.  The bandage is generally removed at home the next day. For the first 2 weeks, knee-high compression may be worn during the daytime.  Compression helps minimize swelling and bruising by pressing on the veins that remain in the leg. Without compression, treated veins may open up or bleed.     For first 24 hours following sedation   Do not drive or operate motorized vehicles or equipment.   Do not return to work for 1 week.   Do not assume responsibility for small children or anyone dependent on your care.   Do not drink alcohol.   Do not participate in rough play and sports.   Have a responsible adult stay with until the morning after surgery.    Activity   You may begin walking immediately after your procedure. This is good for you, but don’t overdo it. Walk at a relaxed pace.   You should refrain from driving a vehicle if you have continued pain in the operative leg(s), are taking narcotic pain medications such as Percocet, or have any suggestion or evidence of delayed reaction time.    For one week after treatment:   Avoid strenuous exercise.   Avoid sitting in a hot tub, swimming pool, or saunas.   Elevate your leg above the level of your heart periodically (for 15 to 30 minutes) throughout the day.    Bathing   For the first 24 hours following your vein procedure, do not shower    After 24 hours, you may shower without your compression stockings on. Pat your skin dry and put on compression stockings or a wrap back on.    Care of the incision(s)   When you remove the stocking or wrap, wash any incision sites gently with soap and water once a day. Pat the area(s) dry. If your incisions are taped closed, remove the tapes after one week. If an incision site is oozing, place gauze on it under the compression stocking or  wrap.    Discomfort   You may take over-the-counter pain medications such as acetaminophen (Tylenol) or ibuprofen (Advil, Motrin) per package directions. You may also apply an ice (covered with a washcloth) to the tender areas.   Following catheter ablation of the vein, you should expect mild to moderate pain in the thigh with redness in the area of vein closure.    When to contact your physician  If you experience any of the following:   Discomfort not relieved by pain medication.   Fever higher than 100.4 degrees Fahrenheit (38 degrees Celsius)   Significant swelling or pain in the leg   Darkening or ulceration (sores) on areas of skin    POSTOPERATIVE APPOINTMENT:  If you have had radiofrequency ablation of saphenous vein(s), you will have an ultrasound of the treated vein(s) following surgery. You may also have a return appointment on that same day. If you do not have the appointment scheduled at the time of your discharge please call [phone].

## 2023-05-18 NOTE — OP NOTE
Zahida Geller  5/18/2023     PREOPERATIVE DIAGNOSIS: Chronic venous hypertension with ulcer and inflammation involving right side (HCC) [I87.331, L97.919]  Venous (peripheral) insufficiency [I87.2]  Edema of both lower extremities [R60.0]  Varicose veins of lower extremities with ulcer and inflammation (HCC) [I83.229, I83.219, L97.919, L97.929]     POSTOPERATIVE DIAGNOSIS: Post-Op Diagnosis Codes:     * Chronic venous hypertension with ulcer and inflammation involving right side [I87.331]     * Venous (peripheral) insufficiency [I87.2]     * Edema of both lower extremities [R60.0]     * Varicose veins of lower extremities with ulcer and inflammation [I83.229, I83.219, L97.919, L97.929]     PROCEDURE PERFORMED:  1.  Ultrasound-guided access of the right greater saphenous vein  2.  Radiofrequency ablation of the right greater saphenous vein  3.  Ultrasound-guided access of the right anterior accessory saphenous vein  4.  Radiofrequency ablation of the right anterior accessory saphenous vein     SURGEON: Douglas Cabrera MD     ANESTHESIA: General.    PREPARATION: Routine.    STAFF: Circulator: Zahida Cadet RN  Scrub Person: Vincent Alvarez Letitia  Vascular Ultrasound Technician: Marilynn Hill    Estimated Blood Loss: minimal    SPECIMENS: None    COMPLICATIONS: None apparent    INDICATIONS: Zahida Geller is a 47 y.o. female with past history of traumatic brain injury with mild cognitive delay, hypertension, diabetes, and asthma. She follows with neuro rest[facility]ative. She has been following with our wound care center here at [facility] due to superficial ulceration to the right medial malleolus region, as well as a wound to the right first toe. She notes a chronic history of lower extremity edema that is worse with prolonged standing and somewhat improved with leg elevation and lying supine. Due to her edema she has been wearing compression stockings in the 20 to 30 mmHg range for several months and  also using leg elevation to try and help reduce her edema symptoms but despite this they persist. Part of work-up through the wound care center included a venous duplex with insufficiency study which I reviewed. It does show evidence of significant reflux in the bilateral greater saphenous veins, the bilateral lesser saphenous veins, and the right anterior accessory saphenous vein. She also had some deep venous reflux in the right popliteal vein, and in the left common femoral vein. Despite ongoing conservative management with compression, leg elevation, and exercise, as well as local wound care wounds persist. The indications, risks, and possible complications of the procedure were explained to the patient, who voiced understanding and wished to proceed with surgery.     PROCEDURE IN DETAIL: The patient was taken to the operating room and placed on the operating table in a supine position. After MAC anesthesia was obtained, the right lower extremity was prepped and draped in a sterile manner.  Under ultrasound guidance and using a micropuncture technique the right lower extremity greater saphenous vein was cannulated and a microwire was placed.  This was confirmed under ultrasound.  A small stab incision was made with 11 blade and a 7 Haitian sheath was placed.  The patient was placed in Trendelenburg position and the saphenofemoral junction was identified under ultrasound.  The catheter was placed up to the junction and pulled back 3 cm and marked.  Now in a similar fashion under ultrasound guidance and using a micropuncture technique the right lower extremity anterior accessory saphenous vein in the thigh was cannulated.  Microwire was placed.  The wire was left in place.  Next, tumescent fluid was instilled along the entire length of the greater saphenous vein under ultrasound guidance.  Once sufficient tumescent fluid was placed radiofrequency ablation commenced.  There was a total of 8 RF cycles for a total  treatment length of 46 cm for a total treatment time of 2 minutes and 40 seconds.  There was an average of 12 W at an average temperature of 120°C.  Upon completion of the ablation the catheter and sheath were removed.  Direct pressure was held for an additional 5-10 minutes to ensure hemostasis.  Now in a similar fashion attention was turned back to the anterior accessory saphenous vein.  A small stab incision was made with a #11 blade and a 7 Kazakh sheath was placed.  With the patient still in Trendelenburg position the saphenofemoral junction was identified under ultrasound.  The catheter was then advanced through the sheath up to the junction and pulled back 3 cm and marked.  Next, tumescent fluid was instilled along the entire length of the anterior accessory saphenous vein under ultrasound guidance.  Once sufficient tumescent fluid was placed radiofrequency ablation commenced.  There was a total of 3 RF cycles for a total treatment length of 13.5 cm with a total treatment time of 1 minute.  There was an average of 10 W at an average temperature of 120 °C.  Upon completion of the ablation the catheter and sheath were removed.  Direct pressure was held for an additional 5 to 10 minutes to ensure hemostasis.  An Ace wrap was placed from the toes to the groin.  Sterile dressings were applied. The patient tolerated the procedure well. Sponge and needle counts were correct. The patient was then awakened and transferred to the outpatient center in stable condition.

## 2023-05-24 ENCOUNTER — OFFICE VISIT (OUTPATIENT)
Dept: NEUROLOGY | Age: 48
End: 2023-05-24
Payer: MEDICAID

## 2023-05-24 ENCOUNTER — HOSPITAL ENCOUNTER (OUTPATIENT)
Dept: ULTRASOUND IMAGING | Facility: HOSPITAL | Age: 48
Discharge: HOME | End: 2023-05-24
Admitting: SURGERY
Payer: MEDICAID

## 2023-05-24 VITALS
SYSTOLIC BLOOD PRESSURE: 157 MMHG | DIASTOLIC BLOOD PRESSURE: 99 MMHG | WEIGHT: 219 LBS | BODY MASS INDEX: 32.34 KG/M2 | HEART RATE: 100 BPM

## 2023-05-24 DIAGNOSIS — G81.10 SPASTIC HEMIPARESIS AFFECTING DOMINANT SIDE (HCC): ICD-10-CM

## 2023-05-24 DIAGNOSIS — Z87.820 HISTORY OF CLOSED HEAD INJURY: ICD-10-CM

## 2023-05-24 DIAGNOSIS — L97.929 VARICOSE VEINS OF LOWER EXTREMITIES WITH ULCER AND INFLAMMATION: ICD-10-CM

## 2023-05-24 DIAGNOSIS — R27.0 ATAXIA: Primary | ICD-10-CM

## 2023-05-24 DIAGNOSIS — L97.919 VARICOSE VEINS OF LOWER EXTREMITIES WITH ULCER AND INFLAMMATION: ICD-10-CM

## 2023-05-24 DIAGNOSIS — I87.2 VENOUS (PERIPHERAL) INSUFFICIENCY: ICD-10-CM

## 2023-05-24 DIAGNOSIS — I87.331 CHRONIC VENOUS HYPERTENSION WITH ULCER AND INFLAMMATION INVOLVING RIGHT SIDE: ICD-10-CM

## 2023-05-24 DIAGNOSIS — R60.0 EDEMA OF BOTH LOWER EXTREMITIES: ICD-10-CM

## 2023-05-24 DIAGNOSIS — I83.219 VARICOSE VEINS OF LOWER EXTREMITIES WITH ULCER AND INFLAMMATION: ICD-10-CM

## 2023-05-24 DIAGNOSIS — D32.9 MENINGIOMA (HCC): ICD-10-CM

## 2023-05-24 DIAGNOSIS — I83.229 VARICOSE VEINS OF LOWER EXTREMITIES WITH ULCER AND INFLAMMATION: ICD-10-CM

## 2023-05-24 PROCEDURE — 93971 EXTREMITY STUDY: CPT

## 2023-05-24 PROCEDURE — 99214 OFFICE O/P EST MOD 30 MIN: CPT | Performed by: PSYCHIATRY & NEUROLOGY

## 2023-05-24 NOTE — PROGRESS NOTES
REVIEW OF SYSTEMS    Constitutional: []Fever []Sweat []Chills [] Recent Injury [x] Denies all unless marked  HEENT:[]Headache  [] Head Injury/Hearing Loss  [] Sore Throat  [] Ear Ache/Dizziness  [x] Denies all unless marked  Spine:  [] Neck pain  [] Back pain  [] Sciaticia  [x] Denies all unless marked  Cardiovascular:[]Heart Disease []Chest Pain [] Palpitations  [x] Denies all unless marked  Pulmonary: []Shortness of Breath []Cough   [x] Denies all unless marke  Gastrointestinal: []Nausea  []Vomiting  []Abdominal Pain  []Constipation  []Diarrhea  []Dark Bloody Stools  [x] Denies all unless marked  Psychiatric/Behavioral:[] Depression [] Anxiety [x] Denies all unless marked  Genitourinary:   [] Frequency  [] Urgency  [] Incontinence [] Pain with Urination  [x] Denies all unless marked  Extremities: [x]Pain  [x]Swelling  [x] Denies all unless marked  Musculoskeletal: [] Muscle Pain  [] Joint Pain  [] Arthritis [] Muscle Cramps [] Muscle Twitches  [x] Denies all unless marked  Sleep: [] Insomnia [] Snoring [] Restless Legs [] Sleep Apnea  [] Daytime Sleepiness  [x] Denies all unless marked  Skin:[] Rash [] Skin Discoloration [x] Denies all unless marked   Neurological: []Visual Disturbance/Memory Loss [x] Loss of Balance [x] Slurred Speech/Weakness [] Seizures  [] Vertigo/Dizziness [x] Denies all unless marked

## 2023-05-24 NOTE — PROGRESS NOTES
Chief Complaint   Patient presents with    Follow-up     Falling/ balance issues  Pt had recent vascular surgery on R leg       Rochell Lefort is a 41y.o. year old female who is seen for evaluation of abnormal CT scan of the head. I originally and lastly saw her for this 6/22. She has a history of a closed head injury and has [facility] around [facility]. She has a spastic right hemiparesis and severe cognitive impairment. In April of 2022 she went to the emergency room and ended up with a CT scan of the head. 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 There is also a partially calcified right parafalcine meningioma measuring 1.7 cm when previously it had been about 1.2. She was referred to neurosurgery. She ended up having stereotactic radiosurgery at [facility] 10/22. She was seen by neurosurgery just last month and those records are reviewed. For the past few months complains of worsening balance and falls. Using a rollator. She is going to be moving to [facility] next week and will not be able to follow-up here. Medications reviewed. She is already getting physical therapy several times a week.   Active Ambulatory Problems     Diagnosis Date Noted    Right foot drop 12/31/2019    Falls frequently 12/31/2019    History of head injury 12/31/2019    Meningioma ([address] 07/27/2022    Sepsis ([address] 04/10/2023     Resolved Ambulatory Problems     Diagnosis Date Noted    No Resolved Ambulatory Problems     Past Medical History:   Diagnosis Date    Anxiety     Asthma     Chronic back pain     Chronic hip pain     Chronic pain syndrome     COPD (chronic obstructive pulmonary disease) (HCC)     Depression     Diabetes mellitus (HCC)     Edema     Encephalomalacia     Hypertension     RLS (restless legs syndrome)     Substance abuse ([address]     TBI (traumatic brain injury) ([address]        Past Surgical History:   Procedure Laterality Date    BACK SURGERY      LEG SURGERY

## 2023-05-25 ENCOUNTER — TELEPHONE (OUTPATIENT)
Dept: VASCULAR SURGERY | Facility: CLINIC | Age: 48
End: 2023-05-25
Payer: MEDICAID

## 2023-05-25 NOTE — TELEPHONE ENCOUNTER
Called patient to confirmed appointment for 05/26/2023 @ 0399 with Dr. Cabrera. I had to leave a voicemail for patient to call back to confirm or to reschedule if needed.

## 2023-05-26 ENCOUNTER — OFFICE VISIT (OUTPATIENT)
Dept: VASCULAR SURGERY | Facility: CLINIC | Age: 48
End: 2023-05-26
Payer: MEDICAID

## 2023-05-26 VITALS — HEIGHT: 67 IN | BODY MASS INDEX: 34.21 KG/M2 | WEIGHT: 218 LBS

## 2023-05-26 DIAGNOSIS — E66.1 CLASS 1 DRUG-INDUCED OBESITY WITHOUT SERIOUS COMORBIDITY WITH BODY MASS INDEX (BMI) OF 34.0 TO 34.9 IN ADULT: ICD-10-CM

## 2023-05-26 DIAGNOSIS — I10 ESSENTIAL HYPERTENSION: ICD-10-CM

## 2023-05-26 DIAGNOSIS — I87.331 CHRONIC VENOUS HYPERTENSION WITH ULCER AND INFLAMMATION INVOLVING RIGHT SIDE: ICD-10-CM

## 2023-05-26 DIAGNOSIS — R60.0 EDEMA OF BOTH LOWER EXTREMITIES: ICD-10-CM

## 2023-05-26 DIAGNOSIS — I87.2 VENOUS (PERIPHERAL) INSUFFICIENCY: ICD-10-CM

## 2023-05-26 DIAGNOSIS — E11.42 TYPE 2 DIABETES MELLITUS WITH DIABETIC POLYNEUROPATHY, WITHOUT LONG-TERM CURRENT USE OF INSULIN: ICD-10-CM

## 2023-05-26 DIAGNOSIS — I83.219 VARICOSE VEINS OF LOWER EXTREMITIES WITH ULCER AND INFLAMMATION: Primary | ICD-10-CM

## 2023-05-26 DIAGNOSIS — L97.929 VARICOSE VEINS OF LOWER EXTREMITIES WITH ULCER AND INFLAMMATION: Primary | ICD-10-CM

## 2023-05-26 DIAGNOSIS — I83.229 VARICOSE VEINS OF LOWER EXTREMITIES WITH ULCER AND INFLAMMATION: Primary | ICD-10-CM

## 2023-05-26 DIAGNOSIS — L97.919 VARICOSE VEINS OF LOWER EXTREMITIES WITH ULCER AND INFLAMMATION: Primary | ICD-10-CM

## 2023-05-26 PROCEDURE — 1159F MED LIST DOCD IN RCRD: CPT | Performed by: SURGERY

## 2023-05-26 PROCEDURE — 1160F RVW MEDS BY RX/DR IN RCRD: CPT | Performed by: SURGERY

## 2023-05-26 PROCEDURE — 99214 OFFICE O/P EST MOD 30 MIN: CPT | Performed by: SURGERY

## 2023-05-26 RX ORDER — ESOMEPRAZOLE MAGNESIUM 40 MG/1
20 FOR SUSPENSION ORAL
COMMUNITY

## 2023-05-26 RX ORDER — LISINOPRIL 30 MG/1
TABLET ORAL EVERY 24 HOURS
COMMUNITY

## 2023-05-26 RX ORDER — IBUPROFEN 200 MG
CAPSULE ORAL
COMMUNITY
Start: 2023-02-06

## 2023-05-26 RX ORDER — BACITRACIN ZINC 500 [IU]/G
OINTMENT TOPICAL
COMMUNITY
Start: 2023-02-13

## 2023-05-26 RX ORDER — GUAIFENESIN 100 MG/5ML
SYRUP ORAL
COMMUNITY
Start: 2023-05-01

## 2023-05-26 RX ORDER — BLOOD-GLUCOSE METER
KIT MISCELLANEOUS
COMMUNITY
Start: 2023-05-01

## 2023-05-26 RX ORDER — CEFDINIR 300 MG/1
CAPSULE ORAL
COMMUNITY
Start: 2023-04-13

## 2023-05-26 RX ORDER — OXYCODONE HYDROCHLORIDE AND ACETAMINOPHEN 5; 325 MG/1; MG/1
TABLET ORAL
COMMUNITY
Start: 2023-05-16

## 2023-05-26 NOTE — PROGRESS NOTES
05/26/2023      Judith Mauro MD  [address] [place] [address] [address] [place] [place] [address]        Zahida Geller  1975    Chief Complaint   Patient presents with    Post-op     1 Week post op for Right RFA. PT had teseting done on 5/24 for RLE Victor [facility] Dup. Pt states everything is fine since procedure.         Dear Judith Mauro MD:    HPI     I had the pleasure of seeing your patient in the office today for follow up.  As you recall, the patient is a 47 y.o. female who we are currently following for lower extremity venous insufficiency and wounds to the right ankle region.  She had been following in our wound care center for these wounds and work-up had included venous duplex with insufficiency study which had shown reflux in the bilateral greater saphenous veins as well as the right anterior accessory saphenous vein.  She subsequently underwent right greater saphenous vein and anterior accessory saphenous vein radiofrequency ablation.  She tolerated these procedures well.  She had recent repeat right lower extremity venous duplex which I reviewed today which shows successful closure of the anterior accessory saphenous vein from the mid thigh to the junction, and the right greater saphenous vein 0.5 cm distal to the saphenofemoral junction through the mid calf.  There was no evidence of DVT.  Previous wounds to her right leg are healed and she notes less heaviness, swelling, and discomfort to the right lower leg after the procedure.  She otherwise feels well with no acute complaints.  Of note she will be moving next week to [place], [place] and transferring her medical care there where she will be living..      Review of Systems   Constitutional: Negative.  Negative for activity change, appetite change, chills, diaphoresis, fatigue and fever.   HENT: Negative.  Negative for congestion, sneezing, sore throat and trouble swallowing.    Eyes: Negative.  Negative for visual disturbance.   Respiratory:  "Negative.  Negative for chest tightness and shortness of breath.    Cardiovascular:  Positive for leg swelling. Negative for chest pain and palpitations.   Gastrointestinal: Negative.  Negative for abdominal distention, abdominal pain, nausea and vomiting.   Endocrine: Negative.    Genitourinary: Negative.    Musculoskeletal: Negative.    Skin: Negative.    Allergic/Immunologic: Negative.    Neurological: Negative.    Hematological: Negative.    Psychiatric/Behavioral: Negative.       Ht 170 cm (66.93\")   Wt 98.9 kg (218 lb)   LMP 05/18/2023   BMI 34.22 kg/m²   Physical Exam  Vitals reviewed.   Constitutional:       Appearance: Normal appearance.   HENT:      Head: Normocephalic and atraumatic.      Nose: Nose normal.      Mouth/Throat:      Mouth: Mucous membranes are moist.   Eyes:      Extraocular Movements: Extraocular movements intact.      Pupils: Pupils are equal, round, and reactive to light.   Cardiovascular:      Rate and Rhythm: Normal rate and regular rhythm.      Pulses: Normal pulses.           Carotid pulses are 2+ on the right side and 2+ on the left side.       Radial pulses are 2+ on the right side and 2+ on the left side.        Femoral pulses are 2+ on the right side and 2+ on the left side.       Popliteal pulses are 2+ on the right side and 2+ on the left side.        Dorsalis pedis pulses are 2+ on the right side and 2+ on the left side.        Posterior tibial pulses are 2+ on the right side and 2+ on the left side.      Comments: Edema bilateral lower extremities medical to knee.  Recent access sites on the right lower extremity thigh and lower leg from saphenous vein and anterior excessively saphenous vein radiofrequency ablation are healing well.  She continues to have some small varicosities telangiectasias throughout the bilateral lower legs.  She otherwise has palpable pulses.  Pulmonary:      Effort: Pulmonary effort is normal. No respiratory distress.   Abdominal:      General: " There is no distension.      Palpations: Abdomen is soft. There is no mass.      Tenderness: There is no abdominal tenderness.   Musculoskeletal:         General: Normal range of motion.      Cervical back: Normal range of motion and neck supple.      Right lower leg: Edema present.      Left lower leg: Edema present.   Skin:     General: Skin is warm and dry.      Capillary Refill: Capillary refill takes less than 2 seconds.   Neurological:      General: No focal deficit present.      Mental Status: She is alert and oriented to person, place, and time.   Psychiatric:         Mood and Affect: Mood normal.         Behavior: Behavior normal.         Thought Content: Thought content normal.         Judgment: Judgment normal.       DIAGNOSTIC DATA:    US Guided Vascular Access    Result Date: 5/18/2023  Narrative: History: Swelling  Comments: Grayscale imaging as well as color flow duplex were used to evaluate the right lower extremity greater saphenous and anterior accessory saphenous veins during venous closure.  The greater saphenous and anterior accessory saphenous veins were successfully cannulated. The catheter was placed up to the junction and pulled back 3 cm and marked.      Impression: Successful endovenous closure of the right lower extremity greater saphenous and anterior accessory saphenous veins. This report was finalized on 05/18/2023 15:02 by Dr. Thad Montoya MD.    US Venous Doppler Lower Extremity Right (duplex)    Result Date: 5/24/2023  Narrative: History: Swelling      Impression: Impression: 1. There is no evidence of deep venous thrombosis of the right lower extremity. 2. The greater saphenous vein is closed from zones 2 through 7. Anterior accessory saphenous vein is closed from zones 2 through 3..  Comments: Right lower extremity venous duplex exam was performed using color Doppler flow, Doppler wave form analysis, and grayscale imaging, with and without compression. There is no evidence of  deep venous thrombosis of the common femoral, superficial femoral, popliteal, posterior tibial, and peroneal veins. There is no thrombus identified in the saphenofemoral junction.  This report was finalized on 05/24/2023 15:57 by Dr. Thad Montoya MD.     Patient Active Problem List   Diagnosis    Diabetes mellitus type 2 with neurological manifestations    Traumatic brain injury    Acute urinary tract infection    Abnormal gait    Allergy to pollen    Benign essential hypertension    Chronic pain due to injury    Diabetes mellitus    Falls frequently    Generalized anxiety disorder    History of head injury    Reactive airways dysfunction syndrome    Right foot drop    Tobacco dependence syndrome    Meningioma    Former smoker    Chronic venous hypertension with ulcer and inflammation involving right side    Venous (peripheral) insufficiency    Edema of both lower extremities    Varicose veins of lower extremities with ulcer and inflammation    Class 1 drug-induced obesity without serious comorbidity with body mass index (BMI) of 34.0 to 34.9 in adult         ICD-10-CM ICD-9-CM   1. Varicose veins of lower extremities with ulcer and inflammation  I83.229 454.2    I83.219     L97.919     L97.929    2. Venous (peripheral) insufficiency  I87.2 459.81   3. Chronic venous hypertension with ulcer and inflammation involving right side  I87.331 459.33   4. Edema of both lower extremities  R60.0 782.3   5. Class 1 drug-induced obesity without serious comorbidity with body mass index (BMI) of 34.0 to 34.9 in adult  E66.1 278.00    Z68.34 V85.34   6. Essential hypertension  I10 401.9   7. Type 2 diabetes mellitus with diabetic polyneuropathy, without long-term current use of insulin  E11.42 250.60     357.2       Lab Frequency Next Occurrence   Follow Anesthesia Guidelines / Protocol Once 04/03/2023   Obtain Informed Consent Once 04/03/2023   Provide NPO Instructions to Patient Once 04/03/2023   Chlorhexidine Skin Prep  Once 04/03/2023   Pregnancy, Urine - Urine, Clean Catch Once 03/29/2023       PLAN: After thoroughly evaluating Zahida Geller, I believe the best course of action is to remain conservative from a vascular standpoint.  She returns after recent right greater saphenous vein and anterior accessory saphenous vein radiofrequency ablation which she tolerated well.  Wounds to her right leg have healed and she has been discharged from wound care.  Her recent venous duplex of the right lower extremity post procedure shows successful closure of both the right greater saphenous vein as well as anterior accessory saphenous vein with no evidence of DVT.  Moving forward recommendation is for continued compression the 20 to 30 mmHg range as well as leg elevation and exercise to further help reduce edema and reflux symptoms.  She does have known reflux in the left greater saphenous vein but this appears to be fairly well controlled with conservative measures.  The patient will be moving to [place], [place] next week and so we will be transitioning her medical care there.  As such she will follow here on an as-needed basis and should she establish with another vascular surgeon where she is moving to I would be happy to send any records as needed.  The patient is to continue taking their medications as previously discussed.   I did discuss vascular risk factors as they pertain to the progression of vascular disease including controlling diabetes, and hypertension. These factors remain stable and controlled on her current regimen. BMI is >= 30 and <35. (Class 1 Obesity). The following options were offered after discussion;: exercise counseling/recommendations and nutrition counseling/recommendations. This was all discussed in full with complete understanding.  Thank you for allowing me to participate in the care of your patient.  Please do not hesitate to call with any questions or concerns.  We will keep you aware of any further  encounters with Zahida Geller.      Sincerely Yours,      Douglas Cabrera MD

## 2023-05-26 NOTE — PATIENT INSTRUCTIONS

## 2023-05-30 ENCOUNTER — TELEPHONE (OUTPATIENT)
Dept: NEUROLOGY | Age: 48
End: 2023-05-30

## 2023-05-30 NOTE — TELEPHONE ENCOUNTER
Unable to send patient to have MRI done before May 31st, her insurance takes 14 days for authorization. I called [facility] where patient is at and she will be moving into a facility in [place]. I will call that Facility in the next day or two and see where patient can have MRI and work on pre-cert.

## (undated) DEVICE — PAD MINOR UNIVERSAL: Brand: MEDLINE INDUSTRIES, INC.

## (undated) DEVICE — INTENDED FOR TISSUE SEPARATION, AND OTHER PROCEDURES THAT REQUIRE A SHARP SURGICAL BLADE TO PUNCTURE OR CUT.: Brand: BARD-PARKER ® STAINLESS STEEL BLADES

## (undated) DEVICE — CATH VASC RF CLOSUREFAST 7CM 7F100CM

## (undated) DEVICE — STERILE ULTRASOUND GEL, SAFEWRAP: Brand: ECOVUE

## (undated) DEVICE — SYR LL TP 10ML STRL

## (undated) DEVICE — STRIP,CLOSURE,WOUND,MEDI-STRIP,1/2X4: Brand: MEDLINE

## (undated) DEVICE — STPCK 4WY ON/OFF VLV M/COLAR FIT 45PSI STRL

## (undated) DEVICE — BNDG ADHS CURAD FLX/FABRC 1X3IN STRL LF

## (undated) DEVICE — BANDAGE,GAUZE,BULKEE II,4.5"X4.1YD,STRL: Brand: MEDLINE

## (undated) DEVICE — ST TB EXT STANDARDBORE 30IN

## (undated) DEVICE — NEEDLE, QUINCKE, 20GX3.5": Brand: MEDLINE

## (undated) DEVICE — GLV SURG DERMASSURE GRN LF PF 8.0

## (undated) DEVICE — APPL CHLORAPREP HI/LITE 26ML ORNG

## (undated) DEVICE — STERILE (14X122CM) TELESCOPICALLY-FOLDED COVER: Brand: CIV-CLEAR™ TRANSDUCER COVER

## (undated) DEVICE — SPNG GZ STRL 2S 4X4 12PLY

## (undated) DEVICE — GLV SURG SENSICARE W/ALOE PF LF 7.5 STRL

## (undated) DEVICE — BNDG ELAS ECON W/CLIP 4IN 5YD LF STRL

## (undated) DEVICE — SHEATH INTRO MICRO 7F 11CM

## (undated) DEVICE — ST INF 2NDARY 20DRP VNT/NOVNT 30IN

## (undated) DEVICE — BAPTIST TURNOVER KIT: Brand: MEDLINE INDUSTRIES, INC.

## (undated) DEVICE — BNDG ELAS W/CLIP 6IN 10YD LF STRL